# Patient Record
Sex: FEMALE | Race: WHITE | Employment: UNEMPLOYED | URBAN - METROPOLITAN AREA
[De-identification: names, ages, dates, MRNs, and addresses within clinical notes are randomized per-mention and may not be internally consistent; named-entity substitution may affect disease eponyms.]

---

## 2017-01-05 RX ORDER — LEVALBUTEROL TARTRATE 45 UG/1
1-2 AEROSOL, METERED ORAL EVERY 6 HOURS PRN
Qty: 1 INHALER | Refills: 5 | Status: SHIPPED | OUTPATIENT
Start: 2017-01-05 | End: 2017-04-27 | Stop reason: SDUPTHER

## 2017-01-06 ENCOUNTER — TELEPHONE (OUTPATIENT)
Dept: PULMONOLOGY | Age: 37
End: 2017-01-06

## 2017-01-10 ENCOUNTER — TELEPHONE (OUTPATIENT)
Dept: PULMONOLOGY | Age: 37
End: 2017-01-10

## 2017-01-25 ENCOUNTER — OFFICE VISIT (OUTPATIENT)
Dept: ORTHOPEDIC SURGERY | Age: 37
End: 2017-01-25

## 2017-01-25 VITALS — WEIGHT: 255.07 LBS | HEIGHT: 65 IN | BODY MASS INDEX: 42.5 KG/M2

## 2017-01-25 DIAGNOSIS — M70.62 TROCHANTERIC BURSITIS OF LEFT HIP: Primary | ICD-10-CM

## 2017-01-25 PROCEDURE — 99213 OFFICE O/P EST LOW 20 MIN: CPT | Performed by: ORTHOPAEDIC SURGERY

## 2017-01-25 RX ORDER — MELOXICAM 7.5 MG/1
TABLET ORAL
Qty: 30 TABLET | Refills: 3 | Status: SHIPPED | OUTPATIENT
Start: 2017-01-25 | End: 2019-03-06

## 2017-02-08 ENCOUNTER — OFFICE VISIT (OUTPATIENT)
Dept: PULMONOLOGY | Age: 37
End: 2017-02-08

## 2017-02-08 VITALS
HEIGHT: 65 IN | TEMPERATURE: 97.2 F | WEIGHT: 263 LBS | DIASTOLIC BLOOD PRESSURE: 75 MMHG | HEART RATE: 98 BPM | BODY MASS INDEX: 43.82 KG/M2 | RESPIRATION RATE: 18 BRPM | SYSTOLIC BLOOD PRESSURE: 107 MMHG | OXYGEN SATURATION: 98 %

## 2017-02-08 DIAGNOSIS — G47.33 OSA ON CPAP: Primary | ICD-10-CM

## 2017-02-08 DIAGNOSIS — Z72.821 POOR SLEEP HYGIENE: ICD-10-CM

## 2017-02-08 DIAGNOSIS — E66.01 OBESITY, CLASS III, BMI 40-49.9 (MORBID OBESITY) (HCC): ICD-10-CM

## 2017-02-08 DIAGNOSIS — Z99.89 OSA ON CPAP: Primary | ICD-10-CM

## 2017-02-08 PROBLEM — E66.813 OBESITY, CLASS III, BMI 40-49.9 (MORBID OBESITY): Status: ACTIVE | Noted: 2017-02-08

## 2017-02-08 PROCEDURE — 99213 OFFICE O/P EST LOW 20 MIN: CPT | Performed by: NURSE PRACTITIONER

## 2017-02-08 ASSESSMENT — SLEEP AND FATIGUE QUESTIONNAIRES
HOW LIKELY ARE YOU TO NOD OFF OR FALL ASLEEP WHILE SITTING QUIETLY AFTER LUNCH WITHOUT ALCOHOL: 2
HOW LIKELY ARE YOU TO NOD OFF OR FALL ASLEEP WHEN YOU ARE A PASSENGER IN A CAR FOR AN HOUR WITHOUT A BREAK: 3
HOW LIKELY ARE YOU TO NOD OFF OR FALL ASLEEP WHILE WATCHING TV: 1
ESS TOTAL SCORE: 14
NECK CIRCUMFERENCE (INCHES): 16.5
HOW LIKELY ARE YOU TO NOD OFF OR FALL ASLEEP WHILE SITTING AND TALKING TO SOMEONE: 1
HOW LIKELY ARE YOU TO NOD OFF OR FALL ASLEEP WHILE LYING DOWN TO REST IN THE AFTERNOON WHEN CIRCUMSTANCES PERMIT: 3
HOW LIKELY ARE YOU TO NOD OFF OR FALL ASLEEP WHILE SITTING AND READING: 2
HOW LIKELY ARE YOU TO NOD OFF OR FALL ASLEEP IN A CAR, WHILE STOPPED FOR A FEW MINUTES IN TRAFFIC: 1
HOW LIKELY ARE YOU TO NOD OFF OR FALL ASLEEP WHILE SITTING INACTIVE IN A PUBLIC PLACE: 1

## 2017-02-27 ENCOUNTER — HOSPITAL ENCOUNTER (OUTPATIENT)
Dept: MAMMOGRAPHY | Age: 37
Discharge: HOME | End: 2017-02-27
Attending: OBSTETRICS & GYNECOLOGY | Admitting: OBSTETRICS & GYNECOLOGY

## 2017-02-27 DIAGNOSIS — N64.4 BREAST TENDERNESS IN FEMALE: ICD-10-CM

## 2017-02-27 DIAGNOSIS — R93.89 ABNORMAL FINDINGS ON DIAGNOSTIC IMAGING OF OTHER SPECIFIED BODY STRUCTURES: ICD-10-CM

## 2017-03-07 ENCOUNTER — OFFICE VISIT (OUTPATIENT)
Dept: PULMONOLOGY | Age: 37
End: 2017-03-07

## 2017-03-07 VITALS
BODY MASS INDEX: 44.65 KG/M2 | DIASTOLIC BLOOD PRESSURE: 76 MMHG | WEIGHT: 268 LBS | HEART RATE: 101 BPM | HEIGHT: 65 IN | OXYGEN SATURATION: 98 % | TEMPERATURE: 98 F | RESPIRATION RATE: 18 BRPM | SYSTOLIC BLOOD PRESSURE: 112 MMHG

## 2017-03-07 DIAGNOSIS — J45.909 UNCOMPLICATED ASTHMA, UNSPECIFIED ASTHMA SEVERITY: Primary | ICD-10-CM

## 2017-03-07 DIAGNOSIS — Z91.09 ENVIRONMENTAL ALLERGIES: ICD-10-CM

## 2017-03-07 DIAGNOSIS — J98.6 DIAPHRAGM PARALYSIS: ICD-10-CM

## 2017-03-07 DIAGNOSIS — Z72.0 TOBACCO ABUSE: ICD-10-CM

## 2017-03-07 DIAGNOSIS — G47.33 OSA (OBSTRUCTIVE SLEEP APNEA): ICD-10-CM

## 2017-03-07 DIAGNOSIS — R06.02 SOB (SHORTNESS OF BREATH): ICD-10-CM

## 2017-03-07 PROCEDURE — 99214 OFFICE O/P EST MOD 30 MIN: CPT | Performed by: INTERNAL MEDICINE

## 2017-04-27 ENCOUNTER — OFFICE VISIT (OUTPATIENT)
Dept: PULMONOLOGY | Age: 37
End: 2017-04-27

## 2017-04-27 ENCOUNTER — TELEPHONE (OUTPATIENT)
Dept: PULMONOLOGY | Age: 37
End: 2017-04-27

## 2017-04-27 VITALS
WEIGHT: 264 LBS | HEIGHT: 65 IN | SYSTOLIC BLOOD PRESSURE: 126 MMHG | BODY MASS INDEX: 43.99 KG/M2 | TEMPERATURE: 98.2 F | DIASTOLIC BLOOD PRESSURE: 82 MMHG | RESPIRATION RATE: 18 BRPM | OXYGEN SATURATION: 98 % | HEART RATE: 100 BPM

## 2017-04-27 DIAGNOSIS — J98.6 DIAPHRAGM PARALYSIS: ICD-10-CM

## 2017-04-27 DIAGNOSIS — Z99.89 OSA ON CPAP: ICD-10-CM

## 2017-04-27 DIAGNOSIS — G47.33 OSA ON CPAP: ICD-10-CM

## 2017-04-27 DIAGNOSIS — J45.901 ASTHMA EXACERBATION: Primary | ICD-10-CM

## 2017-04-27 PROCEDURE — 99214 OFFICE O/P EST MOD 30 MIN: CPT | Performed by: INTERNAL MEDICINE

## 2017-04-27 RX ORDER — BENZONATATE 200 MG/1
200 CAPSULE ORAL 3 TIMES DAILY PRN
Qty: 90 CAPSULE | Refills: 1 | Status: SHIPPED | OUTPATIENT
Start: 2017-04-27 | End: 2017-05-04

## 2017-04-27 RX ORDER — LEVALBUTEROL TARTRATE 45 UG/1
1-2 AEROSOL, METERED ORAL EVERY 6 HOURS PRN
Qty: 1 INHALER | Refills: 5 | Status: SHIPPED | OUTPATIENT
Start: 2017-04-27 | End: 2017-09-07 | Stop reason: SDUPTHER

## 2017-04-27 ASSESSMENT — SLEEP AND FATIGUE QUESTIONNAIRES
HOW LIKELY ARE YOU TO NOD OFF OR FALL ASLEEP IN A CAR, WHILE STOPPED FOR A FEW MINUTES IN TRAFFIC: 1
HOW LIKELY ARE YOU TO NOD OFF OR FALL ASLEEP WHILE SITTING INACTIVE IN A PUBLIC PLACE: 1
NECK CIRCUMFERENCE (INCHES): 16.5
HOW LIKELY ARE YOU TO NOD OFF OR FALL ASLEEP WHILE SITTING AND READING: 2
HOW LIKELY ARE YOU TO NOD OFF OR FALL ASLEEP WHILE SITTING QUIETLY AFTER LUNCH WITHOUT ALCOHOL: 2
HOW LIKELY ARE YOU TO NOD OFF OR FALL ASLEEP WHILE SITTING AND TALKING TO SOMEONE: 1
HOW LIKELY ARE YOU TO NOD OFF OR FALL ASLEEP WHILE LYING DOWN TO REST IN THE AFTERNOON WHEN CIRCUMSTANCES PERMIT: 2
ESS TOTAL SCORE: 13
HOW LIKELY ARE YOU TO NOD OFF OR FALL ASLEEP WHEN YOU ARE A PASSENGER IN A CAR FOR AN HOUR WITHOUT A BREAK: 2
HOW LIKELY ARE YOU TO NOD OFF OR FALL ASLEEP WHILE WATCHING TV: 2

## 2017-08-28 ENCOUNTER — HOSPITAL ENCOUNTER (OUTPATIENT)
Dept: MAMMOGRAPHY | Age: 37
Discharge: HOME | End: 2017-08-28
Attending: OBSTETRICS & GYNECOLOGY | Admitting: OBSTETRICS & GYNECOLOGY

## 2017-08-28 DIAGNOSIS — R93.89 ABNORMAL FINDINGS ON DIAGNOSTIC IMAGING OF OTHER SPECIFIED BODY STRUCTURES: ICD-10-CM

## 2017-08-28 DIAGNOSIS — R92.8 ABNORMAL FINDING ON BREAST IMAGING: ICD-10-CM

## 2017-08-29 ENCOUNTER — HOSPITAL ENCOUNTER (OUTPATIENT)
Dept: NEUROLOGY | Age: 37
Discharge: HOME | End: 2017-08-29
Attending: FAMILY MEDICINE | Admitting: FAMILY MEDICINE

## 2017-08-29 DIAGNOSIS — R20.2 PARESTHESIA OF SKIN: ICD-10-CM

## 2017-09-07 ENCOUNTER — OFFICE VISIT (OUTPATIENT)
Dept: PULMONOLOGY | Age: 37
End: 2017-09-07

## 2017-09-07 VITALS
DIASTOLIC BLOOD PRESSURE: 67 MMHG | WEIGHT: 263 LBS | BODY MASS INDEX: 43.82 KG/M2 | TEMPERATURE: 98.2 F | OXYGEN SATURATION: 97 % | SYSTOLIC BLOOD PRESSURE: 119 MMHG | RESPIRATION RATE: 20 BRPM | HEIGHT: 65 IN | HEART RATE: 94 BPM

## 2017-09-07 DIAGNOSIS — J98.6 DIAPHRAGM PARALYSIS: ICD-10-CM

## 2017-09-07 DIAGNOSIS — R05.9 COUGH: ICD-10-CM

## 2017-09-07 DIAGNOSIS — J45.50 UNCOMPLICATED SEVERE PERSISTENT ASTHMA: Primary | ICD-10-CM

## 2017-09-07 DIAGNOSIS — J30.9 ALLERGIC RHINITIS, UNSPECIFIED ALLERGIC RHINITIS TRIGGER, UNSPECIFIED RHINITIS SEASONALITY: ICD-10-CM

## 2017-09-07 PROCEDURE — 99214 OFFICE O/P EST MOD 30 MIN: CPT | Performed by: INTERNAL MEDICINE

## 2017-09-07 RX ORDER — LEVALBUTEROL INHALATION SOLUTION 0.63 MG/3ML
0.63 SOLUTION RESPIRATORY (INHALATION) 4 TIMES DAILY PRN
Qty: 120 VIAL | Refills: 5 | Status: SHIPPED | OUTPATIENT
Start: 2017-09-07 | End: 2018-02-15 | Stop reason: SDUPTHER

## 2017-09-07 RX ORDER — GABAPENTIN 300 MG/1
300 CAPSULE ORAL DAILY
COMMUNITY
End: 2019-03-06

## 2017-09-07 RX ORDER — LEVALBUTEROL TARTRATE 45 UG/1
1-2 AEROSOL, METERED ORAL EVERY 6 HOURS PRN
Qty: 1 INHALER | Refills: 5 | Status: SHIPPED | OUTPATIENT
Start: 2017-09-07 | End: 2018-10-28

## 2018-02-08 ENCOUNTER — TELEPHONE (OUTPATIENT)
Dept: PULMONOLOGY | Age: 38
End: 2018-02-08

## 2018-02-14 ENCOUNTER — TELEPHONE (OUTPATIENT)
Dept: PULMONOLOGY | Age: 38
End: 2018-02-14

## 2018-02-14 NOTE — TELEPHONE ENCOUNTER
Patient went to urgent care 2/10/18 dx with bronchitis was given antibiotic, cough syrup. Patient is scheduled for a illness visit tomorrow 2/15/18 @ 9:15. Patient wondering if something should be sent in or if she should wait to see Dr. Eliseo Cruz for tomorrow. Do you have the following symptoms? Shortness of Breath  Yes  Wheezing  Yes  Cough  Yes                  Cough Characteristics:                           Productive    No                           Sputum Color    No                           Hemoptysis   n/a                           Consistency of sputum   N/a     Fever:    Yes  Temp:99.9  Chills/Sweats:  Sweats @ Night  What other symptoms are you having?:  Face hot, cheeks get verna red. Head/Head congestion    How long have you had these symptoms? 1 week     [facility]: [facility]           Review medications and allergies: Allergies? Allergies   Allergen Reactions    Other Dermatitis     BANDAIDS, ADHESIVES     Penicillins Swelling    Percocet [Oxycodone-Acetaminophen] Rash    Sulfa Antibiotics Rash                        Currently on Antibiotics? (Drug/Dose/Frequency and how long on?) Yes Zithromax 250 daily                    Systemic Steroids? (Drug/Dose/Frequency and how long on?) No      Last sick call taken on 1/10/17. Meds prescribed were See PCP    LOV: 9/7/17    ASSESSMENT:   · Asthma - mod persistent.  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 Malachi Ibrahim 11/16/15, LE edema on lasix, Hiatal hernia s/p fundoplication 8/8111     PLAN: · Tessalon pearls 200mg PO TID PRN  · Dulera 100 mcg 2 puffs BID  · Refill PRN  xoponex  · Continue Singulair   · Continue nasal steroid daily and Zyrtec - she is not using daily  · sinus rinses PRN   · Avoid second smoke from her   · quit smoking 10/2015; still using nicotine gum -instructed to wean off  · Auto CPAP 10-14cm H20. F/u in sleep clinic  · F/u in 6 months with me for SOB and asthma

## 2018-02-15 ENCOUNTER — OFFICE VISIT (OUTPATIENT)
Dept: PULMONOLOGY | Age: 38
End: 2018-02-15

## 2018-02-15 VITALS
HEIGHT: 65 IN | BODY MASS INDEX: 43.99 KG/M2 | WEIGHT: 264 LBS | HEART RATE: 83 BPM | SYSTOLIC BLOOD PRESSURE: 126 MMHG | RESPIRATION RATE: 20 BRPM | OXYGEN SATURATION: 98 % | TEMPERATURE: 97.9 F | DIASTOLIC BLOOD PRESSURE: 82 MMHG

## 2018-02-15 DIAGNOSIS — J30.1 CHRONIC ALLERGIC RHINITIS DUE TO POLLEN, UNSPECIFIED SEASONALITY: ICD-10-CM

## 2018-02-15 DIAGNOSIS — J06.9 ACUTE URI: Primary | ICD-10-CM

## 2018-02-15 DIAGNOSIS — J45.50 UNCOMPLICATED SEVERE PERSISTENT ASTHMA: ICD-10-CM

## 2018-02-15 DIAGNOSIS — G47.33 OSA ON CPAP: ICD-10-CM

## 2018-02-15 DIAGNOSIS — Z99.89 OSA ON CPAP: ICD-10-CM

## 2018-02-15 PROCEDURE — 99214 OFFICE O/P EST MOD 30 MIN: CPT | Performed by: INTERNAL MEDICINE

## 2018-02-15 PROCEDURE — G8417 CALC BMI ABV UP PARAM F/U: HCPCS | Performed by: INTERNAL MEDICINE

## 2018-02-15 PROCEDURE — G8427 DOCREV CUR MEDS BY ELIG CLIN: HCPCS | Performed by: INTERNAL MEDICINE

## 2018-02-15 PROCEDURE — G8484 FLU IMMUNIZE NO ADMIN: HCPCS | Performed by: INTERNAL MEDICINE

## 2018-02-15 PROCEDURE — 1036F TOBACCO NON-USER: CPT | Performed by: INTERNAL MEDICINE

## 2018-02-15 RX ORDER — PREDNISONE 10 MG/1
30 TABLET ORAL DAILY
Qty: 15 TABLET | Refills: 0 | Status: SHIPPED | OUTPATIENT
Start: 2018-02-15 | End: 2018-02-20

## 2018-02-15 RX ORDER — LEVALBUTEROL INHALATION SOLUTION 0.63 MG/3ML
0.63 SOLUTION RESPIRATORY (INHALATION) 4 TIMES DAILY PRN
Qty: 120 VIAL | Refills: 5 | Status: SHIPPED | OUTPATIENT
Start: 2018-02-15 | End: 2019-03-06 | Stop reason: SDUPTHER

## 2018-02-15 RX ORDER — BENZONATATE 200 MG/1
200 CAPSULE ORAL 3 TIMES DAILY PRN
Qty: 90 CAPSULE | Refills: 1 | Status: SHIPPED | OUTPATIENT
Start: 2018-02-15 | End: 2018-02-22

## 2018-02-15 NOTE — PROGRESS NOTES
[facility] Pulmonary, Critical Care, and Sleep    Outpatient Follow Up Note    CC: asthma and ill visit  Consulting provider: Ellen Canales MD    Interval History: 44 y.o. female patient is here today for an ill visit. She was seen by her PCP and diagnosed with a viral illness and then the next day went to urgent care and treated for the flu with Tamiflu and prednisone and the next day went to the emergency room where she was given cough syrup and antibiotics. Negative rapid flu in ER. She is complaining of pressure in her ears with a cough and runny nose for 1 week. She has stopped getting worse and improved slightly today. At baseline she typically does well and soes not frequently not use her rescue inhale. Initial HPI: h/o smoking, Asthma for about 20 years, obesity, GERD, anxiety presents for dyspnea and asthma. The patient states she has uncontrolled symptoms most of this year. She did move into a new house this year. The patient was seen by her NP yesterday and started on Levaquin, Singulair and steroids. The pt reports she has been on prednisone treatments most of the year. She has SOB and wheezing daily and at night. Dry cough every day and night intermittently. She uses her rescue inhalers several times per day with some help. She does have chronic nasal congestion with post-nasal gtt. Symptoms are worse with strong smells, heat, environmental allergies (she is not sure what they are). The patient does not think that her cats make things worse. Her house has mold and dust. No down bedding. Smokes 1PPD for last 20 years.     Current Medications:    Current Outpatient Prescriptions:     Oseltamivir Phosphate (TAMIFLU PO), Take 1 capsule by mouth daily Is on 3rd days dose, Disp: , Rfl:     predniSONE (DELTASONE) 20 MG tablet, Take 40 mg by mouth daily Is on 3rd days dose, Disp: , Rfl:     ondansetron (ZOFRAN) 4 MG tablet, Take 4 mg by mouth every 8 hours as needed for Nausea or Vomiting, Disp: , Rfl:

## 2018-10-02 ENCOUNTER — OFFICE VISIT (OUTPATIENT)
Dept: PULMONOLOGY | Age: 38
End: 2018-10-02
Payer: COMMERCIAL

## 2018-10-02 VITALS
DIASTOLIC BLOOD PRESSURE: 63 MMHG | HEART RATE: 84 BPM | BODY MASS INDEX: 43.25 KG/M2 | TEMPERATURE: 98.4 F | RESPIRATION RATE: 20 BRPM | HEIGHT: 65 IN | WEIGHT: 259.6 LBS | OXYGEN SATURATION: 97 % | SYSTOLIC BLOOD PRESSURE: 100 MMHG

## 2018-10-02 DIAGNOSIS — J30.2 SEASONAL ALLERGIC RHINITIS, UNSPECIFIED TRIGGER: ICD-10-CM

## 2018-10-02 DIAGNOSIS — J45.50 UNCOMPLICATED SEVERE PERSISTENT ASTHMA: ICD-10-CM

## 2018-10-02 DIAGNOSIS — G47.33 OSA ON CPAP: ICD-10-CM

## 2018-10-02 DIAGNOSIS — Z99.89 OSA ON CPAP: ICD-10-CM

## 2018-10-02 DIAGNOSIS — Z23 NEED FOR INFLUENZA VACCINATION: Primary | ICD-10-CM

## 2018-10-02 PROCEDURE — 1036F TOBACCO NON-USER: CPT | Performed by: INTERNAL MEDICINE

## 2018-10-02 PROCEDURE — G8427 DOCREV CUR MEDS BY ELIG CLIN: HCPCS | Performed by: INTERNAL MEDICINE

## 2018-10-02 PROCEDURE — 90471 IMMUNIZATION ADMIN: CPT | Performed by: INTERNAL MEDICINE

## 2018-10-02 PROCEDURE — 90688 IIV4 VACCINE SPLT 0.5 ML IM: CPT | Performed by: INTERNAL MEDICINE

## 2018-10-02 PROCEDURE — G8417 CALC BMI ABV UP PARAM F/U: HCPCS | Performed by: INTERNAL MEDICINE

## 2018-10-02 PROCEDURE — 99214 OFFICE O/P EST MOD 30 MIN: CPT | Performed by: INTERNAL MEDICINE

## 2018-10-02 PROCEDURE — G8482 FLU IMMUNIZE ORDER/ADMIN: HCPCS | Performed by: INTERNAL MEDICINE

## 2018-10-02 NOTE — PROGRESS NOTES
[facility] Pulmonary, Critical Care, and Sleep    Outpatient Follow Up Note    CC: asthma and ill visit  Consulting provider: Luis Antonio Bullard MD    Interval History: 43 y.o. female  smokes in the house. She wheezes about 1-2 days/week. Worse with allergies or smoke. Her new apt has a roof leak. No visible leak. She has not been using her [facility] as scheduled. Initial HPI: h/o smoking, Asthma for about 20 years, obesity, GERD, anxiety presents for dyspnea and asthma. The patient states she has uncontrolled symptoms most of this year. She did move into a new house this year. The patient was seen by her NP yesterday and started on Levaquin, Singulair and steroids. The pt reports she has been on prednisone treatments most of the year. She has SOB and wheezing daily and at night. Dry cough every day and night intermittently. She uses her rescue inhalers several times per day with some help. She does have chronic nasal congestion with post-nasal gtt. Symptoms are worse with strong smells, heat, environmental allergies (she is not sure what they are). The patient does not think that her cats make things worse. Her house has mold and dust. No down bedding. Smokes 1PPD for last 20 years.     Current Medications:    Current Outpatient Prescriptions:     DULERA 100-5 MCG/ACT inhaler, INHALE 2 PUFFS TWICE DAILY, Disp: 13 g, Rfl: 5    cetirizine (ZYRTEC) 10 MG tablet, Take 1 tablet by mouth daily, Disp: 30 tablet, Rfl: 0    famotidine (PEPCID) 20 MG tablet, Take 1 tablet by mouth 2 times daily, Disp: 60 tablet, Rfl: 0    levalbuterol (XOPENEX) 0.63 MG/3ML nebulization, Take 3 mLs by nebulization 4 times daily as needed for Wheezing or Shortness of Breath J45.50, Disp: 120 vial, Rfl: 5    ondansetron (ZOFRAN) 4 MG tablet, Take 4 mg by mouth every 8 hours as needed for Nausea or Vomiting, Disp: , Rfl:     gabapentin (NEURONTIN) 300 MG capsule, Take 300 mg by mouth daily As directed, Disp: , Rfl:     levalbuterol [facility] HF) 45 MCG/ACT inhaler, Inhale 1-2 puffs into the lungs every 6 hours as needed for Wheezing or Shortness of Breath, Disp: 1 Inhaler, Rfl: 5    Cetirizine HCl (ZYRTEC ALLERGY) 10 MG CAPS, Take 1 tablet by mouth daily as needed (Allergy or asthma), Disp: 15 capsule, Rfl: 0    meloxicam (MOBIC) 7.5 MG tablet, Take 1 tablet daily, Disp: 30 tablet, Rfl: 3    diclofenac sodium (VOLTAREN) 1 % GEL, Apply 4 g topically 4 times daily Apply to left hip as needed, Disp: 1 Tube, Rfl: 2    metFORMIN (GLUCOPHAGE) 500 MG tablet, Take 500 mg by mouth daily (with breakfast), Disp: , Rfl:     losartan (COZAAR) 50 MG tablet, Take 50 mg by mouth daily, Disp: , Rfl:     ranitidine (ZANTAC) 150 MG capsule, Take 150 mg by mouth daily, Disp: , Rfl:     ibuprofen (ADVIL;MOTRIN) 600 MG tablet, Take 1 tablet by mouth every 6 hours as needed for Pain, Disp: 30 tablet, Rfl: 2    potassium chloride (MICRO-K) 10 MEQ CR capsule, Take 10 mEq by mouth 2 times daily, Disp: , Rfl:     montelukast (SINGULAIR) 10 MG tablet, Take 10 mg by mouth nightly, Disp: , Rfl:     Spacer/Aero-Holding Chambers (E-Z SPACER) TED, 1 Device by Does not apply route daily as needed, Disp: 1 Device, Rfl: 0    Peak Flow Meter (POCKET PEAK FLOW METER) TED, 1 Device by Does not apply route 4 times daily, Disp: 1 Device, Rfl: 0    esomeprazole Magnesium (NEXIUM) 20 MG PACK, Take 20 mg by mouth 2 times daily , Disp: , Rfl:     Objective:   PHYSICAL EXAM:    /63   Pulse 84   Temp 98.4 °F (36.9 °C) (Oral)   Resp 20   Ht 5' 5\" (1.651 m)   Wt 259 lb 9.6 oz (117.8 kg)   SpO2 97% Comment: RA  BMI 43.20 kg/m²   Constitutional: In no acute distress. Appears stated age. Well developed and nourished  Eyes: PERRL. No sclera icterus. No conjunctival injection. ENT: Oropharynx erythematous without exudate. Neck: Trachea midline. No thyroid tenderness. Lymph: No cervical LAD. No supraclavicular LAD. Resp: No accessory muscle use. No crackles.  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 Romulo Young 11/16/15, LE edema on lasix, Hiatal hernia s/p fundoplication 9/3407    PLAN:   Dulera 100 mcg BID - reminded to use at least daily.   PRN  Xoponex   PRN tessalon pearls  Continue Singulair   Continue nasal steroid daily and Zyrtec  sinus rinses PRN   Avoid second smoke from her   quit smoking 10/2015; still using nicotine gum -instructed to wean off  Auto CPAP 10-14cm H20. F/u in sleep clinic  F/u in 6 months

## 2018-10-02 NOTE — PATIENT INSTRUCTIONS
Vaccine Information Statement    Influenza (Flu) Vaccine (Inactivated or Recombinant): What you need to know    Many Vaccine Information Statements are available in [place] and other languages. See www.immunize.org/vis  Hojas de Información Sobre Vacunas están disponibles en Español y en muchos otros idiomas. Visite www.immunize.org/vis    1. Why get vaccinated? Influenza (flu) is a contagious disease that spreads around the [place] every year, usually between October and May. Flu is caused by influenza viruses, and is spread mainly by coughing, sneezing, and close contact. Anyone can get flu. Flu strikes suddenly and can last several days. Symptoms vary by age, but can include:   fever/chills   sore throat   muscle aches   fatigue   cough   headache    runny or stuffy nose    Flu can also lead to pneumonia and blood infections, and cause diarrhea and seizures in children. If you have a medical condition, such as heart or lung disease, flu can make it worse. Flu is more dangerous for some people. Infants and young children, people 72years of age and older, pregnant women, and people with certain health conditions or a weakened immune system are at greatest risk. Each year thousands of people in the [facility] die from flu, and many more are hospitalized. Flu vaccine can:   keep you from getting flu,   make flu less severe if you do get it, and   keep you from spreading flu to your family and other people. 2. Inactivated and recombinant flu vaccines    A dose of flu vaccine is recommended every flu season. Children 6 months through 6years of age may need two doses during the same flu season. Everyone else needs only one dose each flu season.        Some inactivated flu vaccines contain a very small amount of a mercury-based preservative called thimerosal. Studies have not shown thimerosal in vaccines to be harmful, but flu vaccines that do not contain thimerosal are

## 2018-10-02 NOTE — PROGRESS NOTES
Vaccine Information Sheet, \"Influenza - Inactivated\"  given to Андрей Stephens, or parent/legal guardian of  Андрей Stephens and verbalized understanding. Patient responses:    Have you ever had a reaction to a flu vaccine? No  Are you able to eat eggs without adverse effects? Yes  Do you have any current illness? No  Have you ever had Guillian Yolo Syndrome? No    Flu vaccine given per order. Please see immunization tab.

## 2018-10-28 ENCOUNTER — HOSPITAL ENCOUNTER (EMERGENCY)
Age: 38
Discharge: HOME | End: 2018-10-28
Payer: COMMERCIAL

## 2018-10-28 ENCOUNTER — APPOINTMENT (OUTPATIENT)
Dept: GENERAL RADIOLOGY | Age: 38
End: 2018-10-28
Payer: COMMERCIAL

## 2018-10-28 VITALS
HEART RATE: 88 BPM | WEIGHT: 259 LBS | DIASTOLIC BLOOD PRESSURE: 82 MMHG | HEIGHT: 65 IN | BODY MASS INDEX: 43.15 KG/M2 | TEMPERATURE: 97.5 F | SYSTOLIC BLOOD PRESSURE: 121 MMHG | RESPIRATION RATE: 14 BRPM | OXYGEN SATURATION: 100 %

## 2018-10-28 DIAGNOSIS — J40 BRONCHITIS: Primary | ICD-10-CM

## 2018-10-28 DIAGNOSIS — G24.5 EYE TWITCH: ICD-10-CM

## 2018-10-28 DIAGNOSIS — R51.9 HEADACHE, CHRONIC DAILY: ICD-10-CM

## 2018-10-28 PROCEDURE — 71046 X-RAY EXAM CHEST 2 VIEWS: CPT

## 2018-10-28 PROCEDURE — 99283 EMERGENCY DEPT VISIT LOW MDM: CPT

## 2018-10-28 RX ORDER — AZITHROMYCIN 250 MG/1
TABLET, FILM COATED ORAL
Qty: 1 PACKET | Refills: 0 | Status: SHIPPED | OUTPATIENT
Start: 2018-10-28 | End: 2019-03-06

## 2018-10-28 RX ORDER — PREDNISONE 20 MG/1
40 TABLET ORAL DAILY
Qty: 10 TABLET | Refills: 0 | Status: SHIPPED | OUTPATIENT
Start: 2018-10-28 | End: 2018-11-02

## 2018-10-28 RX ORDER — BENZONATATE 200 MG/1
200 CAPSULE ORAL 3 TIMES DAILY PRN
Qty: 20 CAPSULE | Refills: 0 | Status: SHIPPED | OUTPATIENT
Start: 2018-10-28 | End: 2018-11-04

## 2018-10-28 RX ORDER — LEVALBUTEROL TARTRATE 45 UG/1
1-2 AEROSOL, METERED ORAL EVERY 6 HOURS PRN
Qty: 1 INHALER | Refills: 1 | Status: SHIPPED | OUTPATIENT
Start: 2018-10-28 | End: 2019-12-10 | Stop reason: SDUPTHER

## 2018-10-28 ASSESSMENT — ENCOUNTER SYMPTOMS
WHEEZING: 1
RHINORRHEA: 1
VOMITING: 0
COUGH: 1

## 2019-01-03 RX ORDER — LEVALBUTEROL TARTRATE 45 UG/1
1-2 AEROSOL, METERED ORAL EVERY 6 HOURS PRN
Qty: 15 G | Refills: 5 | Status: SHIPPED | OUTPATIENT
Start: 2019-01-03 | End: 2019-03-06 | Stop reason: SDUPTHER

## 2019-01-10 ENCOUNTER — APPOINTMENT (OUTPATIENT)
Dept: CT IMAGING | Age: 39
End: 2019-01-10
Payer: COMMERCIAL

## 2019-01-10 ENCOUNTER — HOSPITAL ENCOUNTER (EMERGENCY)
Age: 39
Discharge: HOME | End: 2019-01-10
Attending: EMERGENCY MEDICINE
Payer: COMMERCIAL

## 2019-01-10 VITALS
HEIGHT: 65 IN | RESPIRATION RATE: 16 BRPM | HEART RATE: 86 BPM | OXYGEN SATURATION: 98 % | TEMPERATURE: 98.1 F | DIASTOLIC BLOOD PRESSURE: 76 MMHG | BODY MASS INDEX: 43.15 KG/M2 | WEIGHT: 259 LBS | SYSTOLIC BLOOD PRESSURE: 129 MMHG

## 2019-01-10 DIAGNOSIS — R11.0 NAUSEA: ICD-10-CM

## 2019-01-10 DIAGNOSIS — K59.00 CONSTIPATION, UNSPECIFIED CONSTIPATION TYPE: ICD-10-CM

## 2019-01-10 DIAGNOSIS — R10.30 LOWER ABDOMINAL PAIN: Primary | ICD-10-CM

## 2019-01-10 LAB
A/G RATIO: 1.2 (ref 1.1–2.2)
ALBUMIN SERPL-MCNC: 4 G/DL (ref 3.4–5)
ALP BLD-CCNC: 95 U/L (ref 40–129)
ALT SERPL-CCNC: 13 U/L (ref 10–40)
ANION GAP SERPL CALCULATED.3IONS-SCNC: 11 MMOL/L (ref 3–16)
AST SERPL-CCNC: 12 U/L (ref 15–37)
BILIRUB SERPL-MCNC: <0.2 MG/DL (ref 0–1)
BILIRUBIN URINE: NEGATIVE
BLOOD, URINE: NEGATIVE
BUN BLDV-MCNC: 12 MG/DL (ref 7–20)
CALCIUM SERPL-MCNC: 9.2 MG/DL (ref 8.3–10.6)
CHLORIDE BLD-SCNC: 99 MMOL/L (ref 99–110)
CLARITY: CLEAR
CO2: 29 MMOL/L (ref 21–32)
COLOR: YELLOW
CREAT SERPL-MCNC: 0.7 MG/DL (ref 0.6–1.1)
GFR AFRICAN AMERICAN: >60
GFR NON-AFRICAN AMERICAN: >60
GLOBULIN: 3.3 G/DL
GLUCOSE BLD-MCNC: 95 MG/DL (ref 70–99)
GLUCOSE URINE: NEGATIVE MG/DL
HCG QUALITATIVE: NEGATIVE
HCT VFR BLD CALC: 41.7 % (ref 36–48)
HEMOGLOBIN: 13.4 G/DL (ref 12–16)
KETONES, URINE: NEGATIVE MG/DL
LEUKOCYTE ESTERASE, URINE: NEGATIVE
LIPASE: 15 U/L (ref 13–60)
MCH RBC QN AUTO: 26.4 PG (ref 26–34)
MCHC RBC AUTO-ENTMCNC: 32.2 G/DL (ref 31–36)
MCV RBC AUTO: 82 FL (ref 80–100)
MICROSCOPIC EXAMINATION: NORMAL
NITRITE, URINE: NEGATIVE
PDW BLD-RTO: 13.6 % (ref 12.4–15.4)
PH UA: 6.5
PLATELET # BLD: 276 K/UL (ref 135–450)
PMV BLD AUTO: 8.2 FL (ref 5–10.5)
POTASSIUM SERPL-SCNC: 4 MMOL/L (ref 3.5–5.1)
PROTEIN UA: NEGATIVE MG/DL
RBC # BLD: 5.09 M/UL (ref 4–5.2)
SODIUM BLD-SCNC: 139 MMOL/L (ref 136–145)
SPECIFIC GRAVITY UA: 1.02
TOTAL PROTEIN: 7.3 G/DL (ref 6.4–8.2)
URINE TYPE: NORMAL
UROBILINOGEN, URINE: 0.2 E.U./DL
WBC # BLD: 11.1 K/UL (ref 4–11)

## 2019-01-10 PROCEDURE — 83690 ASSAY OF LIPASE: CPT

## 2019-01-10 PROCEDURE — 74177 CT ABD & PELVIS W/CONTRAST: CPT

## 2019-01-10 PROCEDURE — 84703 CHORIONIC GONADOTROPIN ASSAY: CPT

## 2019-01-10 PROCEDURE — 85027 COMPLETE CBC AUTOMATED: CPT

## 2019-01-10 PROCEDURE — 99284 EMERGENCY DEPT VISIT MOD MDM: CPT

## 2019-01-10 PROCEDURE — 81003 URINALYSIS AUTO W/O SCOPE: CPT

## 2019-01-10 PROCEDURE — 96374 THER/PROPH/DIAG INJ IV PUSH: CPT

## 2019-01-10 PROCEDURE — 2580000003 HC RX 258: Performed by: NURSE PRACTITIONER

## 2019-01-10 PROCEDURE — 36415 COLL VENOUS BLD VENIPUNCTURE: CPT

## 2019-01-10 PROCEDURE — 6360000004 HC RX CONTRAST MEDICATION: Performed by: NURSE PRACTITIONER

## 2019-01-10 PROCEDURE — 80053 COMPREHEN METABOLIC PANEL: CPT

## 2019-01-10 PROCEDURE — 96361 HYDRATE IV INFUSION ADD-ON: CPT

## 2019-01-10 PROCEDURE — 6360000002 HC RX W HCPCS: Performed by: NURSE PRACTITIONER

## 2019-01-10 PROCEDURE — 96372 THER/PROPH/DIAG INJ SC/IM: CPT

## 2019-01-10 RX ORDER — ONDANSETRON 2 MG/ML
4 INJECTION INTRAMUSCULAR; INTRAVENOUS ONCE
Status: COMPLETED | OUTPATIENT
Start: 2019-01-10 | End: 2019-01-10

## 2019-01-10 RX ORDER — DICYCLOMINE HYDROCHLORIDE 10 MG/ML
20 INJECTION INTRAMUSCULAR ONCE
Status: COMPLETED | OUTPATIENT
Start: 2019-01-10 | End: 2019-01-10

## 2019-01-10 RX ORDER — 0.9 % SODIUM CHLORIDE 0.9 %
1000 INTRAVENOUS SOLUTION INTRAVENOUS ONCE
Status: COMPLETED | OUTPATIENT
Start: 2019-01-10 | End: 2019-01-10

## 2019-01-10 RX ORDER — DICYCLOMINE HYDROCHLORIDE 10 MG/1
10 CAPSULE ORAL
Qty: 120 CAPSULE | Refills: 0 | Status: SHIPPED | OUTPATIENT
Start: 2019-01-10 | End: 2019-12-10

## 2019-01-10 RX ORDER — ONDANSETRON 4 MG/1
4 TABLET, ORALLY DISINTEGRATING ORAL EVERY 8 HOURS PRN
Qty: 12 TABLET | Refills: 0 | Status: SHIPPED | OUTPATIENT
Start: 2019-01-10 | End: 2022-03-09

## 2019-01-10 RX ADMIN — ONDANSETRON 4 MG: 2 INJECTION INTRAMUSCULAR; INTRAVENOUS at 21:10

## 2019-01-10 RX ADMIN — SODIUM CHLORIDE 1000 ML: 9 INJECTION, SOLUTION INTRAVENOUS at 21:10

## 2019-01-10 RX ADMIN — DICYCLOMINE HYDROCHLORIDE 20 MG: 20 INJECTION, SOLUTION INTRAMUSCULAR at 21:10

## 2019-01-10 RX ADMIN — IOPAMIDOL 75 ML: 755 INJECTION, SOLUTION INTRAVENOUS at 21:45

## 2019-01-10 ASSESSMENT — PAIN DESCRIPTION - PROGRESSION: CLINICAL_PROGRESSION: GRADUALLY WORSENING

## 2019-01-10 ASSESSMENT — PAIN DESCRIPTION - ORIENTATION: ORIENTATION: MID

## 2019-01-10 ASSESSMENT — PAIN SCALES - GENERAL
PAINLEVEL_OUTOF10: 3
PAINLEVEL_OUTOF10: 7

## 2019-01-10 ASSESSMENT — PAIN DESCRIPTION - DESCRIPTORS: DESCRIPTORS: ACHING

## 2019-01-10 ASSESSMENT — PAIN DESCRIPTION - LOCATION: LOCATION: ABDOMEN

## 2019-01-10 ASSESSMENT — PAIN DESCRIPTION - ONSET: ONSET: ON-GOING

## 2019-01-10 ASSESSMENT — PAIN DESCRIPTION - FREQUENCY: FREQUENCY: CONTINUOUS

## 2019-01-23 ENCOUNTER — HOSPITAL ENCOUNTER (OUTPATIENT)
Age: 39
Discharge: HOME | End: 2019-01-23
Payer: COMMERCIAL

## 2019-01-23 LAB
C DIFFICILE TOXIN, EIA: NORMAL
WHITE BLOOD CELLS (WBC), STOOL: NORMAL

## 2019-01-23 PROCEDURE — 87449 NOS EACH ORGANISM AG IA: CPT

## 2019-01-23 PROCEDURE — 83993 ASSAY FOR CALPROTECTIN FECAL: CPT

## 2019-01-23 PROCEDURE — 87336 ENTAMOEB HIST DISPR AG IA: CPT

## 2019-01-23 PROCEDURE — 83630 LACTOFERRIN FECAL (QUAL): CPT

## 2019-01-23 PROCEDURE — 87328 CRYPTOSPORIDIUM AG IA: CPT

## 2019-01-23 PROCEDURE — 87046 STOOL CULTR AEROBIC BACT EA: CPT

## 2019-01-23 PROCEDURE — 87505 NFCT AGENT DETECTION GI: CPT

## 2019-01-23 PROCEDURE — 87324 CLOSTRIDIUM AG IA: CPT

## 2019-01-24 LAB
CRYPTOSPORIDIUM ANTIGEN STOOL: NORMAL
E HISTOLYTICA ANTIGEN STOOL: NORMAL
GI BACTERIAL PATHOGENS BY PCR: NORMAL
GIARDIA ANTIGEN STOOL: NORMAL

## 2019-01-25 LAB — CALPROTECTIN: 33 UG/G

## 2019-02-26 ENCOUNTER — TELEPHONE (OUTPATIENT)
Dept: PULMONOLOGY | Age: 39
End: 2019-02-26

## 2019-02-26 RX ORDER — ALBUTEROL SULFATE 90 UG/1
2 AEROSOL, METERED RESPIRATORY (INHALATION) EVERY 6 HOURS PRN
Qty: 18 G | Refills: 0 | Status: SHIPPED | OUTPATIENT
Start: 2019-02-26 | End: 2019-03-06 | Stop reason: ALTCHOICE

## 2019-02-27 ENCOUNTER — APPOINTMENT (OUTPATIENT)
Dept: GENERAL RADIOLOGY | Age: 39
End: 2019-02-27
Payer: COMMERCIAL

## 2019-02-27 ENCOUNTER — HOSPITAL ENCOUNTER (EMERGENCY)
Age: 39
Discharge: HOME | End: 2019-02-27
Attending: EMERGENCY MEDICINE
Payer: COMMERCIAL

## 2019-02-27 VITALS
RESPIRATION RATE: 22 BRPM | HEART RATE: 97 BPM | BODY MASS INDEX: 40.82 KG/M2 | SYSTOLIC BLOOD PRESSURE: 138 MMHG | WEIGHT: 245 LBS | HEIGHT: 65 IN | OXYGEN SATURATION: 99 % | DIASTOLIC BLOOD PRESSURE: 90 MMHG | TEMPERATURE: 97.6 F

## 2019-02-27 DIAGNOSIS — J45.21 MILD INTERMITTENT ASTHMA WITH EXACERBATION: ICD-10-CM

## 2019-02-27 DIAGNOSIS — R03.0 ELEVATED BLOOD PRESSURE READING: ICD-10-CM

## 2019-02-27 DIAGNOSIS — J20.9 ACUTE BRONCHITIS, UNSPECIFIED ORGANISM: Primary | ICD-10-CM

## 2019-02-27 DIAGNOSIS — Z20.828 EXPOSURE TO INFLUENZA: ICD-10-CM

## 2019-02-27 PROCEDURE — 99283 EMERGENCY DEPT VISIT LOW MDM: CPT

## 2019-02-27 PROCEDURE — 6370000000 HC RX 637 (ALT 250 FOR IP): Performed by: EMERGENCY MEDICINE

## 2019-02-27 PROCEDURE — 71046 X-RAY EXAM CHEST 2 VIEWS: CPT

## 2019-02-27 RX ORDER — ALBUTEROL SULFATE 90 UG/1
2 AEROSOL, METERED RESPIRATORY (INHALATION) EVERY 6 HOURS PRN
Status: DISCONTINUED | OUTPATIENT
Start: 2019-02-27 | End: 2019-02-27 | Stop reason: HOSPADM

## 2019-02-27 RX ORDER — PREDNISONE 20 MG/1
60 TABLET ORAL ONCE
Status: COMPLETED | OUTPATIENT
Start: 2019-02-27 | End: 2019-02-27

## 2019-02-27 RX ORDER — OSELTAMIVIR PHOSPHATE 75 MG/1
75 CAPSULE ORAL ONCE
Status: COMPLETED | OUTPATIENT
Start: 2019-02-27 | End: 2019-02-27

## 2019-02-27 RX ORDER — DOXYCYCLINE HYCLATE 100 MG/1
100 CAPSULE ORAL 2 TIMES DAILY
COMMUNITY
End: 2019-03-06

## 2019-02-27 RX ORDER — IPRATROPIUM BROMIDE AND ALBUTEROL SULFATE 2.5; .5 MG/3ML; MG/3ML
1 SOLUTION RESPIRATORY (INHALATION) ONCE
Status: COMPLETED | OUTPATIENT
Start: 2019-02-27 | End: 2019-02-27

## 2019-02-27 RX ORDER — OSELTAMIVIR PHOSPHATE 75 MG/1
75 CAPSULE ORAL DAILY
Qty: 7 CAPSULE | Refills: 0 | Status: SHIPPED | OUTPATIENT
Start: 2019-02-27 | End: 2019-03-06

## 2019-02-27 RX ADMIN — OSELTAMIVIR PHOSPHATE 75 MG: 75 CAPSULE ORAL at 05:55

## 2019-02-27 RX ADMIN — IPRATROPIUM BROMIDE AND ALBUTEROL SULFATE 1 AMPULE: .5; 3 SOLUTION RESPIRATORY (INHALATION) at 05:18

## 2019-02-27 RX ADMIN — PREDNISONE 60 MG: 20 TABLET ORAL at 05:16

## 2019-02-27 ASSESSMENT — PAIN DESCRIPTION - PAIN TYPE: TYPE: ACUTE PAIN

## 2019-02-27 ASSESSMENT — PAIN SCALES - GENERAL: PAINLEVEL_OUTOF10: 7

## 2019-02-27 ASSESSMENT — PAIN DESCRIPTION - LOCATION: LOCATION: CHEST

## 2019-03-01 ENCOUNTER — TELEPHONE (OUTPATIENT)
Dept: PULMONOLOGY | Age: 39
End: 2019-03-01

## 2019-03-01 NOTE — TELEPHONE ENCOUNTER
Patient did not show for Wheezing f/u appointment  with Dr. Bette Quinones on 3/1/19    Same Day Cancellation: Yes    Patient rescheduled:  No    New appointment: pt already scheduled for 4/2/19    Patient was also no show on: N/a    LOV 10/2/18      ASSESSMENT:   · Asthma - mod persistent. 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 Lori Fitzpatrick 11/16/15, LE edema on lasix, Hiatal hernia s/p fundoplication 2/5074     PLAN:   · Dulera 100 mcg BID - reminded to use at least daily.   · PRN  Xoponex   · PRN tessalon pearls  · Continue Singulair   · Continue nasal steroid daily and Zyrtec  · sinus rinses PRN   · Avoid second smoke from her   · quit smoking 10/2015; still using nicotine gum -instructed to wean off  · Auto CPAP 10-14cm H20. F/u in sleep clinic  · F/u in 6 months

## 2019-03-04 ENCOUNTER — HOSPITAL ENCOUNTER (EMERGENCY)
Age: 39
Discharge: HOME | End: 2019-03-04
Attending: EMERGENCY MEDICINE
Payer: COMMERCIAL

## 2019-03-04 ENCOUNTER — APPOINTMENT (OUTPATIENT)
Dept: CT IMAGING | Age: 39
End: 2019-03-04
Payer: COMMERCIAL

## 2019-03-04 VITALS
BODY MASS INDEX: 40.82 KG/M2 | HEIGHT: 65 IN | SYSTOLIC BLOOD PRESSURE: 106 MMHG | TEMPERATURE: 97.6 F | DIASTOLIC BLOOD PRESSURE: 68 MMHG | RESPIRATION RATE: 12 BRPM | WEIGHT: 245 LBS | HEART RATE: 75 BPM | OXYGEN SATURATION: 100 %

## 2019-03-04 DIAGNOSIS — S39.012A STRAIN OF LUMBAR REGION, INITIAL ENCOUNTER: Primary | ICD-10-CM

## 2019-03-04 LAB
BACTERIA: ABNORMAL /HPF
BILIRUBIN URINE: NEGATIVE
BLOOD, URINE: NEGATIVE
CLARITY: CLEAR
COLOR: YELLOW
EPITHELIAL CELLS, UA: ABNORMAL /HPF
GLUCOSE URINE: NEGATIVE MG/DL
HCG(URINE) PREGNANCY TEST: NEGATIVE
KETONES, URINE: NEGATIVE MG/DL
LEUKOCYTE ESTERASE, URINE: NEGATIVE
MICROSCOPIC EXAMINATION: ABNORMAL
MUCUS: ABNORMAL /LPF
NITRITE, URINE: NEGATIVE
PH UA: 6 (ref 5–8)
PROTEIN UA: NEGATIVE MG/DL
RBC UA: ABNORMAL /HPF (ref 0–2)
SPECIFIC GRAVITY UA: >=1.03 (ref 1–1.03)
UROBILINOGEN, URINE: 0.2 E.U./DL
WBC UA: ABNORMAL /HPF (ref 0–5)

## 2019-03-04 PROCEDURE — 96372 THER/PROPH/DIAG INJ SC/IM: CPT

## 2019-03-04 PROCEDURE — 6360000002 HC RX W HCPCS: Performed by: EMERGENCY MEDICINE

## 2019-03-04 PROCEDURE — 99284 EMERGENCY DEPT VISIT MOD MDM: CPT

## 2019-03-04 PROCEDURE — 84703 CHORIONIC GONADOTROPIN ASSAY: CPT

## 2019-03-04 PROCEDURE — 72131 CT LUMBAR SPINE W/O DYE: CPT

## 2019-03-04 PROCEDURE — 81001 URINALYSIS AUTO W/SCOPE: CPT

## 2019-03-04 RX ORDER — ORPHENADRINE CITRATE 30 MG/ML
60 INJECTION INTRAMUSCULAR; INTRAVENOUS ONCE
Status: COMPLETED | OUTPATIENT
Start: 2019-03-04 | End: 2019-03-04

## 2019-03-04 RX ORDER — METHOCARBAMOL 750 MG/1
750 TABLET, FILM COATED ORAL 3 TIMES DAILY PRN
Qty: 15 TABLET | Refills: 0 | Status: SHIPPED | OUTPATIENT
Start: 2019-03-04 | End: 2019-03-14

## 2019-03-04 RX ORDER — KETOROLAC TROMETHAMINE 30 MG/ML
60 INJECTION, SOLUTION INTRAMUSCULAR; INTRAVENOUS ONCE
Status: COMPLETED | OUTPATIENT
Start: 2019-03-04 | End: 2019-03-04

## 2019-03-04 RX ADMIN — ORPHENADRINE CITRATE 60 MG: 30 INJECTION INTRAMUSCULAR; INTRAVENOUS at 07:46

## 2019-03-04 RX ADMIN — KETOROLAC TROMETHAMINE 60 MG: 60 INJECTION, SOLUTION INTRAMUSCULAR at 07:46

## 2019-03-04 ASSESSMENT — PAIN SCALES - GENERAL
PAINLEVEL_OUTOF10: 8
PAINLEVEL_OUTOF10: 3
PAINLEVEL_OUTOF10: 8

## 2019-03-04 ASSESSMENT — PAIN DESCRIPTION - LOCATION
LOCATION: BACK
LOCATION: BACK

## 2019-03-04 ASSESSMENT — PAIN DESCRIPTION - PAIN TYPE: TYPE: ACUTE PAIN

## 2019-03-06 ENCOUNTER — OFFICE VISIT (OUTPATIENT)
Dept: ORTHOPEDIC SURGERY | Age: 39
End: 2019-03-06
Payer: COMMERCIAL

## 2019-03-06 VITALS
WEIGHT: 244.93 LBS | SYSTOLIC BLOOD PRESSURE: 139 MMHG | HEART RATE: 100 BPM | HEIGHT: 65 IN | DIASTOLIC BLOOD PRESSURE: 85 MMHG | BODY MASS INDEX: 40.81 KG/M2

## 2019-03-06 DIAGNOSIS — M54.50 LUMBAR SPINE PAIN: Primary | ICD-10-CM

## 2019-03-06 DIAGNOSIS — S39.012A STRAIN OF LUMBAR REGION, INITIAL ENCOUNTER: ICD-10-CM

## 2019-03-06 PROCEDURE — 99213 OFFICE O/P EST LOW 20 MIN: CPT | Performed by: PHYSICIAN ASSISTANT

## 2019-03-06 PROCEDURE — G8417 CALC BMI ABV UP PARAM F/U: HCPCS | Performed by: PHYSICIAN ASSISTANT

## 2019-03-06 PROCEDURE — G8427 DOCREV CUR MEDS BY ELIG CLIN: HCPCS | Performed by: PHYSICIAN ASSISTANT

## 2019-03-06 PROCEDURE — L0625 LO FLEX L1-BELOW L5 PRE OTS: HCPCS | Performed by: PHYSICIAN ASSISTANT

## 2019-03-06 PROCEDURE — 1036F TOBACCO NON-USER: CPT | Performed by: PHYSICIAN ASSISTANT

## 2019-03-06 PROCEDURE — G8482 FLU IMMUNIZE ORDER/ADMIN: HCPCS | Performed by: PHYSICIAN ASSISTANT

## 2019-03-06 RX ORDER — VENLAFAXINE HYDROCHLORIDE 75 MG/1
CAPSULE, EXTENDED RELEASE ORAL
COMMUNITY
Start: 2019-02-21 | End: 2019-12-10 | Stop reason: ALTCHOICE

## 2019-03-06 RX ORDER — HYDROCODONE BITARTRATE AND ACETAMINOPHEN 5; 325 MG/1; MG/1
1 TABLET ORAL EVERY 8 HOURS PRN
Qty: 20 TABLET | Refills: 0 | Status: SHIPPED | OUTPATIENT
Start: 2019-03-06 | End: 2019-03-13

## 2019-04-02 ENCOUNTER — OFFICE VISIT (OUTPATIENT)
Dept: PULMONOLOGY | Age: 39
End: 2019-04-02
Payer: COMMERCIAL

## 2019-04-02 VITALS
OXYGEN SATURATION: 98 % | RESPIRATION RATE: 22 BRPM | SYSTOLIC BLOOD PRESSURE: 124 MMHG | HEART RATE: 110 BPM | DIASTOLIC BLOOD PRESSURE: 76 MMHG | HEIGHT: 65 IN | WEIGHT: 260.4 LBS | BODY MASS INDEX: 43.39 KG/M2

## 2019-04-02 DIAGNOSIS — G47.33 OSA ON CPAP: ICD-10-CM

## 2019-04-02 DIAGNOSIS — J30.2 SEASONAL ALLERGIC RHINITIS, UNSPECIFIED TRIGGER: ICD-10-CM

## 2019-04-02 DIAGNOSIS — Z99.89 OSA ON CPAP: ICD-10-CM

## 2019-04-02 DIAGNOSIS — J45.50 UNCOMPLICATED SEVERE PERSISTENT ASTHMA: Primary | ICD-10-CM

## 2019-04-02 PROCEDURE — G8427 DOCREV CUR MEDS BY ELIG CLIN: HCPCS | Performed by: INTERNAL MEDICINE

## 2019-04-02 PROCEDURE — 99214 OFFICE O/P EST MOD 30 MIN: CPT | Performed by: INTERNAL MEDICINE

## 2019-04-02 PROCEDURE — G8417 CALC BMI ABV UP PARAM F/U: HCPCS | Performed by: INTERNAL MEDICINE

## 2019-04-02 PROCEDURE — 1036F TOBACCO NON-USER: CPT | Performed by: INTERNAL MEDICINE

## 2019-04-02 NOTE — PATIENT INSTRUCTIONS
Refused to brice with sleep clinic    Please keep all of your future appointments scheduled by [facility] Pulmonary office. Out of respect for other patients and providers, you may be asked to reschedule your appointment if you arrive later than your scheduled appointment time. Appointments cancelled less than 24hrs in advance will be considered a no show. Patients with three missed appointments within 1 year or four missed appointments within 2 years can be dismissed from the practice.

## 2019-04-02 NOTE — PROGRESS NOTES
consistent with pneumonia. A patchy 9 mm opacity   within the left upper lobe is new from prior exam, and favored to also be   infectious in etiology. 2. Gallstones. 2/15/16 CXR: LLL has cleared and RLL improved but still has basilar ASD. No pleural effusion seen. 4/15/16 CXR:  right elevated hemidiaphragm and right basilar atelectasis    8/31/16 SNIFF test: impaired right diaphragm    2/14/18 cxr: The lungs are without acute focal process.  There is no effusion or   pneumothorax. The cardiomediastinal silhouette is without acute process. The   osseous structures are without acute process. Impression   No acute process. ASSESSMENT:   Asthma - mod persistent. 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 Sharee Galeano 11/16/15, LE edema on lasix, Hiatal hernia s/p fundoplication 5/8076    PLAN:   Dulera 100 mcg BID - reminded to use at least daily.   PRN  Xoponex   PRN tessalon pearls  Continue Singulair   Continue nasal steroid daily and Zyrtec  sinus rinses PRN   Avoid second smoke from her   quit smoking 10/2015; still using nicotine gum -instructed to wean off  Auto CPAP 10-14cm H20. r/s in sleep clinic  F/u in 6 months

## 2019-09-30 ENCOUNTER — TELEPHONE (OUTPATIENT)
Dept: PULMONOLOGY | Age: 39
End: 2019-09-30

## 2019-12-10 ENCOUNTER — OFFICE VISIT (OUTPATIENT)
Dept: PULMONOLOGY | Age: 39
End: 2019-12-10
Payer: COMMERCIAL

## 2019-12-10 VITALS
HEIGHT: 65 IN | DIASTOLIC BLOOD PRESSURE: 76 MMHG | WEIGHT: 281.6 LBS | RESPIRATION RATE: 16 BRPM | SYSTOLIC BLOOD PRESSURE: 118 MMHG | HEART RATE: 83 BPM | OXYGEN SATURATION: 97 % | BODY MASS INDEX: 46.92 KG/M2 | TEMPERATURE: 97.4 F

## 2019-12-10 DIAGNOSIS — J45.50 UNCOMPLICATED SEVERE PERSISTENT ASTHMA: Primary | ICD-10-CM

## 2019-12-10 DIAGNOSIS — J30.2 SEASONAL ALLERGIC RHINITIS, UNSPECIFIED TRIGGER: ICD-10-CM

## 2019-12-10 DIAGNOSIS — G47.33 OSA ON CPAP: ICD-10-CM

## 2019-12-10 DIAGNOSIS — Z99.89 OSA ON CPAP: ICD-10-CM

## 2019-12-10 PROCEDURE — G8417 CALC BMI ABV UP PARAM F/U: HCPCS | Performed by: INTERNAL MEDICINE

## 2019-12-10 PROCEDURE — 1036F TOBACCO NON-USER: CPT | Performed by: INTERNAL MEDICINE

## 2019-12-10 PROCEDURE — G8427 DOCREV CUR MEDS BY ELIG CLIN: HCPCS | Performed by: INTERNAL MEDICINE

## 2019-12-10 PROCEDURE — G8484 FLU IMMUNIZE NO ADMIN: HCPCS | Performed by: INTERNAL MEDICINE

## 2019-12-10 PROCEDURE — 99214 OFFICE O/P EST MOD 30 MIN: CPT | Performed by: INTERNAL MEDICINE

## 2019-12-10 RX ORDER — LEVALBUTEROL TARTRATE 45 UG/1
1-2 AEROSOL, METERED ORAL EVERY 6 HOURS PRN
Qty: 1 INHALER | Refills: 5 | Status: SHIPPED | OUTPATIENT
Start: 2019-12-10 | End: 2020-10-12

## 2019-12-10 RX ORDER — CETIRIZINE HYDROCHLORIDE 10 MG/1
10 TABLET ORAL DAILY
Qty: 30 TABLET | Refills: 0 | Status: SHIPPED | OUTPATIENT
Start: 2019-12-10 | End: 2020-01-21

## 2020-01-21 RX ORDER — CETIRIZINE HYDROCHLORIDE 10 MG/1
TABLET ORAL
Qty: 30 TABLET | Refills: 5 | Status: SHIPPED | OUTPATIENT
Start: 2020-01-21 | End: 2020-11-30

## 2020-04-27 ENCOUNTER — TELEPHONE (OUTPATIENT)
Dept: PULMONOLOGY | Age: 40
End: 2020-04-27

## 2020-04-27 NOTE — TELEPHONE ENCOUNTER
Spoke with pt to offer VV, pt is currently at [facility] respiratory clinic\" and needs to call back.

## 2020-04-27 NOTE — TELEPHONE ENCOUNTER
COVID testing negative at [facility].    Do you have the following symptoms? Shortness of Breath  yes  Wheezing  yes  Cough  Yes-deep in her chest                  Cough Characteristics:                           Productive    no                           Sputum Color    no                           Hemoptysis   no                           Consistency of sputum   no     Fever:    99    Chills/Sweats:  chills  What other symptoms are you having?:  Diarrhea, sore throat, headache, tired    How long have you had these symptoms? 4/23/20     Pharmacy: [facility]          Review medications and allergies: Allergies? Allergies   Allergen Reactions    Other Dermatitis     BANDAIDS, ADHESIVES     Penicillins Swelling    Percocet [Oxycodone-Acetaminophen] Rash    Sulfa Antibiotics Rash                        Currently on Antibiotics? (Drug/Dose/Frequency and how long on?) no                   Systemic Steroids? (Drug/Dose/Frequency and how long on?) no       ASSESSMENT: 12/10/19  · Asthma - mod persistent. 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 Medina Alhaji 11/16/15, LE edema on lasix, Hiatal hernia s/p fundoplication 4/9667     PLAN:   · Dulera 100 mcg BID - reminded to use at least daily.   · PRN  Xoponex   · Rx for inhaler refills x 6  · PRN tessalon pearlsl  · Refill flonase and zyrtec%%%  · sinus rinses PRN   · Avoid second smoke from her   · quit smoking 10/2015; still using nicotine gum -instructed to wean off  · Auto CPAP 10-14cm H20. r/s in sleep clinic and restart CPAP  · F/u in 6 months

## 2020-06-03 ENCOUNTER — TELEPHONE (OUTPATIENT)
Dept: PULMONOLOGY | Age: 40
End: 2020-06-03

## 2020-06-03 NOTE — TELEPHONE ENCOUNTER

## 2020-06-10 ENCOUNTER — VIRTUAL VISIT (OUTPATIENT)
Dept: PULMONOLOGY | Age: 40
End: 2020-06-10
Payer: COMMERCIAL

## 2020-06-10 PROCEDURE — 99214 OFFICE O/P EST MOD 30 MIN: CPT | Performed by: INTERNAL MEDICINE

## 2020-06-10 RX ORDER — BENZONATATE 200 MG/1
200 CAPSULE ORAL 3 TIMES DAILY PRN
Qty: 90 CAPSULE | Refills: 1 | Status: SHIPPED | OUTPATIENT
Start: 2020-06-10

## 2020-06-10 NOTE — PROGRESS NOTES
no vitals taken for this visit. Constitutional:  No acute distress. Appears well developed and nourished. Eyes: EOM intact. Conjunctivae anicteric. No visible discharge. HENT: Head is normocephalic and atraumatic. Mucus membranes are moist and the tongue appears normal. Normal appearing nose. External Ears normal.   Neck: No obvious mass and the trachea is midline. Respiratory: No accessory muscle usage. Respiratory effort normal. No visualized signs of difficulty breathing or respiratory distress  Psychiatric: No anxiety or Agitation. Alert and Oriented to person, place and time. Normal judgement and insight. LABS:  Reviewed any pertinent new labs that are available.   Neg aspergillus Ab    PFTs 8/31/16 FVC  (64%) FEV1 1.97 (62%) FEV1/FVC ratio    TLC 2.24 (64%)  RV  (%)   DLCO (70%) Bronchodilator response: no    Allergic to digs, weeds, trees, molds    IMAGING:  I personally reviewed and interpreted the following today in the office:   10/19/15 Chest CT:  Impression: Small multifocal groundglass opacities in the lungs   predominantly the upper lobes. Small multifocal pneumonias are   suspected. Bronchial wall thickening is also present in the upper   lungs probably due to a superimposed acute bronchitis. No pleural   effusion. Cholelithiasis. 2/7/16 Chest CT: IMPRESSION:   1. Consolidation in both lower lobes, right greater than left, with small   right pleural effusion, consistent with pneumonia. A patchy 9 mm opacity   within the left upper lobe is new from prior exam, and favored to also be   infectious in etiology. 2. Gallstones. 2/15/16 CXR: LLL has cleared and RLL improved but still has basilar ASD. No pleural effusion seen. 4/15/16 CXR:  right elevated hemidiaphragm and right basilar atelectasis    8/31/16 SNIFF test: impaired right diaphragm    2/14/18 cxr: The lungs are without acute focal process.  There is no effusion or   pneumothorax.  The cardiomediastinal

## 2020-08-04 ENCOUNTER — OFFICE VISIT (OUTPATIENT)
Dept: ORTHOPEDIC SURGERY | Age: 40
End: 2020-08-04
Payer: COMMERCIAL

## 2020-08-04 VITALS — WEIGHT: 281 LBS | HEIGHT: 65 IN | BODY MASS INDEX: 46.82 KG/M2

## 2020-08-04 PROCEDURE — 99213 OFFICE O/P EST LOW 20 MIN: CPT | Performed by: PHYSICIAN ASSISTANT

## 2020-08-04 PROCEDURE — G8417 CALC BMI ABV UP PARAM F/U: HCPCS | Performed by: PHYSICIAN ASSISTANT

## 2020-08-04 PROCEDURE — 1036F TOBACCO NON-USER: CPT | Performed by: PHYSICIAN ASSISTANT

## 2020-08-04 PROCEDURE — G8427 DOCREV CUR MEDS BY ELIG CLIN: HCPCS | Performed by: PHYSICIAN ASSISTANT

## 2020-08-04 RX ORDER — DICLOFENAC SODIUM 75 MG/1
75 TABLET, DELAYED RELEASE ORAL 2 TIMES DAILY WITH MEALS
Qty: 40 TABLET | Refills: 0 | Status: SHIPPED | OUTPATIENT
Start: 2020-08-04 | End: 2021-07-26

## 2020-08-04 RX ORDER — CYCLOBENZAPRINE HCL 10 MG
10 TABLET ORAL NIGHTLY PRN
Qty: 10 TABLET | Refills: 0 | Status: SHIPPED | OUTPATIENT
Start: 2020-08-04 | End: 2020-08-14

## 2020-08-04 NOTE — PROGRESS NOTES
CHIEF COMPLAINT:    Chief Complaint   Patient presents with    Shoulder Pain     Patient states that she was trying to climb into her [place] and she slipped. Pain into anterior aspect of left arm. HISTORY OF PRESENT ILLNESS:                The patient is a 38 y.o. female who presents to clinic for evaluation of left shoulder pain. She states she was pulling herself up into her [place] with a handrail when she slipped and fell from the [place]. She reports that the shoulder was pulled behind her. Since that time, she has had significant weakness and limited range of motion of this arm. She has had multiple surgeries on the contralateral shoulder and has some permanent restrictions with her right shoulder. Past Medical History:   Diagnosis Date    Anesthesia complication     severe hypotension with block    Anxiety and depression     Arthritis     Asthma     Chronic bronchitis (HCC)     Fibromyalgia     Hypertension     Migraine     Pneumonia     Rash     Reflux     Sleep apnea     Wears glasses         Work Status: Unemployed    The pain assessment was noted & is as follows:  Pain Assessment  Location of Pain: Shoulder  Location Modifiers: Left  Severity of Pain: 8  Quality of Pain: Dull, Aching  Duration of Pain: Persistent  Frequency of Pain: Constant  Aggravating Factors:  Other (Comment)  Limiting Behavior: Yes  Relieving Factors: Rest  Result of Injury: Yes  Work-Related Injury: No  Are there other pain locations you wish to document?: No]      Work Status/Functionality:     Past Medical History: Medical history form was reviewed today & can be found in the media tab  Past Medical History:   Diagnosis Date    Anesthesia complication     severe hypotension with block    Anxiety and depression     Arthritis     Asthma     Chronic bronchitis (HCC)     Fibromyalgia     Hypertension     Migraine     Pneumonia     Rash     Reflux     Sleep apnea     Wears glasses       Past Surgical History:     Past Surgical History:   Procedure Laterality Date    ABDOMEN SURGERY      BRONCHOSCOPY  16     SECTION      x 2    COLONOSCOPY      normal per pt    CYST REMOVAL      DENTAL SURGERY      teeth removed    ENDOSCOPY, COLON, DIAGNOSTIC      GASTRIC FUNDOPLICATION  8867    HYSTEROSCOPY  09/10/2015    Hysteroscopy, dilation and curettage with the MyoSure, NovaSure    OTHER SURGICAL HISTORY  9/10/15    HYSTEROSCOPY, DILATATION AND CURETTAGE WITH MYOSURE, NOVASURE    SHOULDER SURGERY      right    THROAT SURGERY Right 2015    vocal cord stripping    TUBAL LIGATION      UPPER GASTROINTESTINAL ENDOSCOPY  13    gastritis, hiatal hernia, hemorrhoids    UPPER GASTROINTESTINAL ENDOSCOPY      10/2015    UPPER GASTROINTESTINAL ENDOSCOPY  2016    URETHRA SURGERY       Current Medications:     Current Outpatient Medications:     cyclobenzaprine (FLEXERIL) 10 MG tablet, Take 1 tablet by mouth nightly as needed for Muscle spasms, Disp: 10 tablet, Rfl: 0    diclofenac (VOLTAREN) 75 MG EC tablet, Take 1 tablet by mouth 2 times daily (with meals), Disp: 40 tablet, Rfl: 0    benzonatate (TESSALON) 200 MG capsule, Take 1 capsule by mouth 3 times daily as needed for Cough, Disp: 90 capsule, Rfl: 1    cetirizine (ZYRTEC) 10 MG tablet, TAKE ONE TABLET BY MOUTH DAILY, Disp: 30 tablet, Rfl: 5    mometasone-formoterol (DULERA) 100-5 MCG/ACT inhaler, INHALE 2 PUFFS TWICE DAILY, Disp: 1 Inhaler, Rfl: 5    levalbuterol (XOPENEX HFA) 45 MCG/ACT inhaler, Inhale 1-2 puffs into the lungs every 6 hours as needed for Wheezing or Shortness of Breath, Disp: 1 Inhaler, Rfl: 5    ondansetron (ZOFRAN ODT) 4 MG disintegrating tablet, Take 1 tablet by mouth every 8 hours as needed for Nausea or Vomiting, Disp: 12 tablet, Rfl: 0    metFORMIN (GLUCOPHAGE) 500 MG tablet, Take 500 mg by mouth daily (with breakfast), Disp: , Rfl:     esomeprazole Magnesium (NEXIUM) 20 MG PACK, Take 20 mg by mouth 2 times daily , Disp: , Rfl:   Allergies: Other; Penicillins; Morphine and related; Oxycodone-acetaminophen; Percocet [oxycodone-acetaminophen]; and Sulfa antibiotics  Social History:    reports that she quit smoking about 4 years ago. Her smoking use included cigarettes. She has a 44.00 pack-year smoking history. She has never used smokeless tobacco. She reports current alcohol use. She reports that she does not use drugs. Family History:   Family History   Problem Relation Age of Onset    Mental Illness Mother         suicide    Diabetes Father     High Blood Pressure Father     Kidney Disease Father     High Cholesterol Father     Asthma Neg Hx     Cancer Neg Hx     Emphysema Neg Hx     Heart Failure Neg Hx     Hypertension Neg Hx        REVIEW OF SYSTEMS:   For new problems, a full review of systems will be found scanned in the patient's chart. CONSTITUTIONAL: Denies unexplained weight loss, fevers, chills   NEUROLOGICAL: Denies unsteady gait or progressive weakness  SKIN: Denies skin changes, delayed healing, rash, itching       PHYSICAL EXAM:    Vitals: Height 5' 5\" (1.651 m), weight 281 lb (127.5 kg), not currently breastfeeding. GENERAL EXAM:  · General Apparence: Patient is adequately groomed with no evidence of malnutrition. · Orientation: The patient is oriented to time, place and person. · Mood & Affect:The patient's mood and affect are appropriate       Left shoulder PHYSICAL EXAMINATION:  · Inspection: No visible deformity. · Palpation: Tenderness to palpation at the subacromial space and deltoid insertion    · Range of Motion: Active range of motion is severely limited. She can perform approximately 40 degrees of active abduction. Passively, I can bring her to approximately 90 degrees of abduction.     · Strength: Isolated supraspinatus strength testing reveals significant weakness when compared to the contralateral side    · Special Tests: Limited by pain          · Skin:  There are no rashes, ulcerations or lesions. · There are no dysvascular changes     Gait & station: Normal gait      Additional Examinations:        Right Upper Extremity:  Examination of the right upper extremity does not show any tenderness, deformity or injury. Range of motion is unremarkable. There is no gross instability. There are no rashes, ulcerations or lesions. Strength and tone are normal.      Diagnostic Testing: The following x rays were read and interpreted by myself      1.  4 x-rays of the left shoulder were taken today and reveal normal anatomy with no acute fractures    Orders     Orders Placed This Encounter   Procedures    XR SHOULDER LEFT (MIN 2 VIEWS)     Standing Status:   Future     Number of Occurrences:   1     Standing Expiration Date:   9/4/2020    MRI SHOULDER LEFT WO CONTRAST     Standing Status:   Future     Standing Expiration Date:   8/4/2021     Scheduling Instructions:      Sahra Stoner      [phone] [address] [place] [address]; [address], [address]            PUSH TO [facility] PACS            PATIENT TO CALL AND SCHEDULE WHEN MRI APPROVED     Order Specific Question:   Reason for exam:     Answer:   R/O RTC TEAR    So 8 ab rstr can/web pre ots     Patient was prescribed a Breg Shure Shoulder Immobilizer. The left shoulder will require stabilization / immobilization from this orthosis. The orthosis will assist in protecting the affected area, provide functional support and facilitate healing. The patient was educated and fit by a healthcare professional with expert knowledge and specialization in brace application while under the direct supervision of the treating physician. Verbal and written instructions for the use of and application of this item were provided. They were instructed to contact the office immediately should the brace result in increased pain, decreased sensation, increased swelling or worsening of the condition. Assessment / Treatment Plan:     1. Left shoulder rotator cuff tear    Given her history of injury and significant clinical limitations today, I am going to obtain an MRI to rule out rotator cuff tear. She is going to follow-up with Dr. Lamar Mittal. She was provided with a sling today for comfort, Voltaren 75 mg and Flexeril she can take nightly for spasms. Side effects of these medications were discussed and she has taken them in the past.    I spent 15 minutes face-to-face with the patient and greater than 50% of that time was spent counseling/coordinating care for the above-stated diagnosis      [facility]istaPal[facility]    This dictation was performed with a verbal recognition program (DRAGON) and it was checked for errors. It is possible that there are still dictated errors within this office note. If so, please bring any errors to my attention for an addendum. All efforts were made to ensure that this office note is accurate.

## 2020-08-06 ENCOUNTER — TELEPHONE (OUTPATIENT)
Dept: ORTHOPEDIC SURGERY | Age: 40
End: 2020-08-06

## 2020-08-18 ENCOUNTER — OFFICE VISIT (OUTPATIENT)
Dept: ORTHOPEDIC SURGERY | Age: 40
End: 2020-08-18
Payer: COMMERCIAL

## 2020-08-18 ENCOUNTER — HOSPITAL ENCOUNTER (OUTPATIENT)
Dept: PHYSICAL THERAPY | Age: 40
Setting detail: THERAPIES SERIES
Discharge: HOME | End: 2020-08-18
Payer: COMMERCIAL

## 2020-08-18 VITALS — BODY MASS INDEX: 46.82 KG/M2 | HEIGHT: 65 IN | WEIGHT: 281 LBS

## 2020-08-18 PROCEDURE — G8417 CALC BMI ABV UP PARAM F/U: HCPCS | Performed by: ORTHOPAEDIC SURGERY

## 2020-08-18 PROCEDURE — G8427 DOCREV CUR MEDS BY ELIG CLIN: HCPCS | Performed by: ORTHOPAEDIC SURGERY

## 2020-08-18 PROCEDURE — 99242 OFF/OP CONSLTJ NEW/EST SF 20: CPT | Performed by: ORTHOPAEDIC SURGERY

## 2020-08-18 RX ORDER — MELOXICAM 15 MG/1
15 TABLET ORAL DAILY
Qty: 90 TABLET | Refills: 0 | Status: SHIPPED | OUTPATIENT
Start: 2020-08-18 | End: 2020-11-16

## 2020-08-19 NOTE — PROGRESS NOTES
device    Spine good cervical rotation    Additional Comments:         Radiology:     She has a high-quality MRI available for review. Imaging demonstrates concentric glenohumeral alignment. There is some strain and low-grade interstitial tearing of the supraspinatus but no compromise of the footprint. She has a superior labral lesion with some extent into the anterior-inferior quadrant but good integrity of the capsule. Assessment : Anterior superior glenoid labral tear, rotator cuff strain, mild scapular dyskinesis. Underlying diabetes and will observe for any evidence of developing adhesive capsulitis      Office Procedures:  Orders Placed This Encounter   Procedures    OSR PT [facility] Physical Therapy     Referral Priority:   Routine     Referral Type:   Eval and Treat     Referral Reason:   Specialty Services Required     Requested Specialty:   Physical Therapy     Number of Visits Requested:   1       Treatment Plan: We had a very nice visit today reviewing the anatomy and function of the shoulder. We reviewed the results of her MRI and physical exam.  Presently given her findings and exam I think she has an excellent prognosis with conservative care. I suggested that we should utilize an anti-inflammatory program which she is already on. Combine this with some physical therapy which will focus on scapular stability, some early rotator cuff activation and glenohumeral stability. I think if she continues this for a good 4 to 6 weeks she will see real improvements. If not we will see her back and discuss further course of treatment. We appreciate the opportunity to care for this patient. The trust that is implicit in this referral does not go unnoticed. We will do our very best to provide high-quality care that goes above and beyond standards. Please feel free contact us with any questions or concerns about this patient.               [address] [facility] [place] Partner of [address] [facility]  Shoulder and Sports Medicine Surgery     This dictation was performed with a verbal recognition program [facility] ) and it was checked for errors. It is possible that there are still dictated errors within this office note. If so, please bring any errors to my attention for an addendum. All efforts were made to ensure that this office note is accurate.

## 2020-08-28 ENCOUNTER — OFFICE VISIT (OUTPATIENT)
Dept: ORTHOPEDIC SURGERY | Age: 40
End: 2020-08-28
Payer: COMMERCIAL

## 2020-08-28 VITALS — WEIGHT: 281 LBS | HEIGHT: 65 IN | BODY MASS INDEX: 46.82 KG/M2

## 2020-08-28 PROCEDURE — 99214 OFFICE O/P EST MOD 30 MIN: CPT | Performed by: ORTHOPAEDIC SURGERY

## 2020-08-28 PROCEDURE — G8427 DOCREV CUR MEDS BY ELIG CLIN: HCPCS | Performed by: ORTHOPAEDIC SURGERY

## 2020-08-28 PROCEDURE — 20610 DRAIN/INJ JOINT/BURSA W/O US: CPT | Performed by: ORTHOPAEDIC SURGERY

## 2020-08-28 PROCEDURE — 1036F TOBACCO NON-USER: CPT | Performed by: ORTHOPAEDIC SURGERY

## 2020-08-28 PROCEDURE — G8417 CALC BMI ABV UP PARAM F/U: HCPCS | Performed by: ORTHOPAEDIC SURGERY

## 2020-08-28 RX ORDER — TRIAMCINOLONE ACETONIDE 40 MG/ML
80 INJECTION, SUSPENSION INTRA-ARTICULAR; INTRAMUSCULAR ONCE
Status: COMPLETED | OUTPATIENT
Start: 2020-08-28 | End: 2020-08-28

## 2020-08-28 RX ORDER — BUPIVACAINE HYDROCHLORIDE 2.5 MG/ML
2 INJECTION, SOLUTION INFILTRATION; PERINEURAL ONCE
Status: COMPLETED | OUTPATIENT
Start: 2020-08-28 | End: 2020-08-28

## 2020-08-28 RX ADMIN — BUPIVACAINE HYDROCHLORIDE 5 MG: 2.5 INJECTION, SOLUTION INFILTRATION; PERINEURAL at 11:23

## 2020-08-28 RX ADMIN — TRIAMCINOLONE ACETONIDE 80 MG: 40 INJECTION, SUSPENSION INTRA-ARTICULAR; INTRAMUSCULAR at 11:23

## 2020-08-28 NOTE — PROGRESS NOTES
Department of Orthopedic Surgery   Progress Note      Follow-Up: Left shoulder pain    Subjective:     Chris Kimbrough is a 54-year-old female that we saw on August 18. She has a complex medical history including being diagnosed with fibromyalgia as well as carrying diagnosis of diabetes. Admits her blood sugar control has not been good recently. She has a history of a right shoulder arthroscopy which has left her with some persistent pain in the right shoulder. She has the diagnosis of a left hip pain, lumbar pain and recalcitrant plantar fasciitis. Much of her pathology has been bilateral.  She apparently saw a rheumatologist at some point in the past.  She reports that her inflammatory markers were elevated at that time. She had an MRI of her left shoulder that demonstrated some very low-grade partial-thickness rotator cuff damage less than 25%. Perhaps some subtle fraying of the labrum anterior superior. I have been concerned about the possibility of developing adhesive capsulitis given her diabetes    She reports that she has been unable to tolerate physical therapy. Symptoms are severe and affecting her daily life. However she allows that her symptoms in the left shoulder are not much different than the right. She is also very limited by her hip and back. Objective:     @IPVITALS(24)@    Review of Systems  Pertinent items are noted in HPI  Denies fever, chills, confusion, bowel/bladder active change. Review of systems reviewed from Patient History Form dated on August 28 and available in the patient's chart under the Media tab. Pain Assessment  Location of Pain: Shoulder  Location Modifiers: Right, Left  Severity of Pain: 6  Quality of Pain: Dull, Aching, Throbbing  Duration of Pain: Persistent  Frequency of Pain: Constant  Aggravating Factors: Bending, Stretching, Straightening, Exercise    Exam: On exam today she has no overlying skin changes such as erythema or warmth.   She has some tenderness along both the rotator interval anteriorly as well as the posterior joint line. She essentially has pain with most provocative maneuvers including dynamic labral shear, Jobes and O'Briens. However she has no focal deficits and isometric strength just limited by her pain and effort. I detect no instability on sulcus or load shift testing. Imaging: I reviewed again her MRI    Office Procedures:  After careful consideration of the risks and benefits, the left shoulder   subacromial   joint was injected under sterile conditions and well-tolerated. The skin was sterilized initially with alcohol and subsequently with Betadine. 80 mg of triamcinalone and 2 mL of quarter percent plain Marcaine were utilized and injected with a 21-gauge needle. Injection was well tolerated. No local skin reactions. No adverse events. Initial response assessed. Please see associated injection template for Mojgan Cherrytonegarcía 47 #. Assessment:     #1. Left shoulder pain with potential causes including partial-thickness rotator cuff lesion, low-grade, and superior labral lesion. However clinical presentation consistent with significant myofascial pain as well    #2. Multifocal inflammatory myofascial pain including the right shoulder as well with minimal improvement after previous surgery, left hip, low back and bilateral feet. .     Plan:      1: We had a quite lengthy visit today with Mrs. Penny Hebert. I recommended that we take a more holistic view of her overall pain inflammation and health. I recommended that we look at her nutrition, exercise and fingerstick blood sugar control. I think she could get some benefit from consultation with a rheumatologist regarding addressing elevated inflammatory markers and also a holistic look at this. Specifically with regard to the shoulder I recommended a diagnostic and therapeutic injection today.   Like to see whether this helps and assists her in performing her therapy as well as relieving her symptoms. This was well-tolerated today in the office. I do think she could benefit from some ongoing stretching and scapular and postural improvement. We will continue to observe for signs of frozen shoulder. Generally, we will see her back as needed based upon her symptoms. I do strongly recommend that we continue conservative treatment is much as possible. I spent 25+ minutes with the patient including 13+ minutes face to face with the patient discussing and answering questions regarding the risks, benefits, and complications of fibromyalgia and inflammatory disease in detail. We talked about the importance of her own holistic self-care and exercise         Follow-Up Visit:  prn            110 [facility] and Sports Medicine Surgery      This dictation was performed with a verbal recognition program (DRAGON) and it was checked for errors. It is possible that there are still dictated errors within this office note. If so, please bring any errors to my attention for an addendum. All efforts were made to ensure that this office note is accurate.

## 2020-09-01 ENCOUNTER — OFFICE VISIT (OUTPATIENT)
Dept: RHEUMATOLOGY | Age: 40
End: 2020-09-01
Payer: COMMERCIAL

## 2020-09-01 VITALS — WEIGHT: 281 LBS | HEIGHT: 65 IN | TEMPERATURE: 97.3 F | BODY MASS INDEX: 46.82 KG/M2

## 2020-09-01 PROCEDURE — G8417 CALC BMI ABV UP PARAM F/U: HCPCS | Performed by: INTERNAL MEDICINE

## 2020-09-01 PROCEDURE — 99244 OFF/OP CNSLTJ NEW/EST MOD 40: CPT | Performed by: INTERNAL MEDICINE

## 2020-09-01 PROCEDURE — G8427 DOCREV CUR MEDS BY ELIG CLIN: HCPCS | Performed by: INTERNAL MEDICINE

## 2020-09-01 RX ORDER — PREGABALIN 50 MG/1
50 CAPSULE ORAL 2 TIMES DAILY
Qty: 60 CAPSULE | Refills: 2 | Status: SHIPPED | OUTPATIENT
Start: 2020-09-01 | End: 2020-12-28

## 2020-09-01 NOTE — PROGRESS NOTES
[address], MD                                                           [address] [place] [address] Allie 1019 (f) [phone] (F)      Dear Dr. Taina Che MD:  Please find Rheumatology assessment. Thank you for giving me the opportunity to be involved in [facility] and I look forward following Pauline along with you. If you have any questions or concerns please feel free to reach me. Note is transcribed using voice recognition software. Inadvertent computerized transcription errors may be present. Patient identification: Guevara Hernandez: [id number],51 y.o. Sex: female     A/P  Roe Ma was seen today for joint pain. Diagnoses and all orders for this visit:    Fibromyalgia  -     pregabalin (LYRICA) 50 MG capsule; Take 1 capsule by mouth 2 times daily for 30 days. Morbid obesity with BMI of 45.0-49.9, adult ([facility] [address]    Screening for rheumatic disorder      Chronic musculoskeletal discomfort is from fibromyalgia which is compounded by morbid obesity, obstructive sleep apnea uncorrected and osteoarthritis. No evidence of systemic inflammatory arthritis. RF, CCP, STACY inflammatory markers are normal.       Plan-   Fibromyalgia-Discussed about diagnosis, pathophysiology and management options. Patient was made aware that fibromyalgia is pain perception disorder rather than organic structural disorder. It is a non-joint , non-life-threatening condition which requires active participations in terms of physical reconditioning from patients. Went over medical management as well as physical reconditioning with aquatic therapy/PT and self exercises.  We went over various FDA approved medications- Lyrica, Savella and Cymbalta, also discussed about off level use of muscle relaxants. I gave her written information on FMS. In her case-morbid obesity and uncorrected sleep apnea are the main comorbidities that need to be addressed. These are definitely making her fibromyalgia much worse, and causing a lot of mechanical musculoskeletal discomfort. Treatment expectations were discussed with patient as medications have small role to play, and largely her motivation to address comorbidities including morbid obesity and sleep apnea. We also talked about dietary measures, daily exercises. She was not too convinced that these are factors playing a role in chronic pain, believes that it could be inflammation believes that she has inflammation all over the body. She tried Cymbalta did not help, Effexor did not help, gabapentin did not help, does not recall taking Lyrica although her previous rheumatology note mentioned that she did take Lyrica. We can certainly try Lyrica 50 mg twice daily, side effects and directions explained however she will probably not get good results unless compounding factors are addressed. All her questions were answered at my best capacity. I recommend that she follows up with primary care physician for further management of fibromyalgia and chronic pain. I have nothing much to add in her care as she does not have inflammatory arthritis. Patient indicates understanding and agrees with the management plan. I reviewed patient's history, referral documents and electronic medical records including outside rheumatology and also notes. Copy of consult note is being routed electronically/faxed to referring physician. #######################################################################    REASON FOR CONSULTATION:  Patient is being seen at the request of  Rip Hutchison MD / Trace Tao MD for evaluation management of chronic pain.     HISTORY OF PRESENT ILLNESS:  38 y.o. female with sleep Other Topics Concern    Not on file   Social History Narrative    Not on file       No family history of autoimmune diseases    Current Outpatient Medications   Medication Sig Dispense Refill    pregabalin (LYRICA) 50 MG capsule Take 1 capsule by mouth 2 times daily for 30 days. 60 capsule 2    meloxicam (MOBIC) 15 MG tablet Take 1 tablet by mouth daily 90 tablet 0    diclofenac (VOLTAREN) 75 MG EC tablet Take 1 tablet by mouth 2 times daily (with meals) 40 tablet 0    benzonatate (TESSALON) 200 MG capsule Take 1 capsule by mouth 3 times daily as needed for Cough 90 capsule 1    cetirizine (ZYRTEC) 10 MG tablet TAKE ONE TABLET BY MOUTH DAILY 30 tablet 5    mometasone-formoterol (DULERA) 100-5 MCG/ACT inhaler INHALE 2 PUFFS TWICE DAILY 1 Inhaler 5    levalbuterol (XOPENEX HFA) 45 MCG/ACT inhaler Inhale 1-2 puffs into the lungs every 6 hours as needed for Wheezing or Shortness of Breath 1 Inhaler 5    ondansetron (ZOFRAN ODT) 4 MG disintegrating tablet Take 1 tablet by mouth every 8 hours as needed for Nausea or Vomiting 12 tablet 0    metFORMIN (GLUCOPHAGE) 500 MG tablet Take 500 mg by mouth daily (with breakfast)      esomeprazole Magnesium (NEXIUM) 20 MG PACK Take 20 mg by mouth 2 times daily        No current facility-administered medications for this visit. Allergies   Allergen Reactions    Other Dermatitis     BANDAIDS, ADHESIVES     Penicillins Swelling    Morphine And Related Other (See Comments)     nausea and vomiting      Oxycodone-Acetaminophen Rash    Percocet [Oxycodone-Acetaminophen] Rash    Sulfa Antibiotics Rash       PHYSICAL EXAM:    Vitals:    Temp 97.3 °F (36.3 °C) (Skin)   Ht 5' 5\" (1.651 m)   Wt 281 lb (127.5 kg)   BMI 46.76 kg/m²   General appearance/ Psychiatric: well nourished, and well groomed, normal judgement, alert, appears stated age and cooperative.    MKS: Extremely sensitive to pain, has generalized hyperesthesia even with superficial pressure wherever I touch.  I do not appreciate any tender, swollen or inflamed joints in upper or lower extremities, full range of motion all peripheral joints. Normal gait, muscle strength in upper and lower extremities. Skin: No rashes, no induration or skin thickening or nodules. No evidence ischemia or deformities noted in digits or nails. HEENT:short neck, normal lids, lacrimal glands and pupils. No oral or nasal ulcers. Salivary glands reveal no evidence of abnormality. External inspection of the ears and nose within normal limits. Neck: No masses or asymmetry. No thyroid enlargement. Chest: Normal effort, clear to auscultation. Heart:  Normal s1/s2, no leg edema. Neurologic: normal deep tendon reflexes. No foot or wrist drop. DATA:   Lab Results   Component Value Date    WBC 11.1 (H) 01/10/2019    HGB 13.4 01/10/2019    HCT 41.7 01/10/2019    MCV 82.0 01/10/2019     01/10/2019         Chemistry        Component Value Date/Time     01/10/2019 2047    K 4.0 01/10/2019 2047    CL 99 01/10/2019 2047    CO2 29 01/10/2019 2047    BUN 12 01/10/2019 2047    CREATININE 0.7 01/10/2019 2047        Component Value Date/Time    CALCIUM 9.2 01/10/2019 2047    ALKPHOS 95 01/10/2019 2047    AST 12 (L) 01/10/2019 2047    ALT 13 01/10/2019 2047    BILITOT <0.2 01/10/2019 2047          No results found for: Odalis Mckeon  No results found for: SEDRATE  No results found for: CRP  No results found for: STACY, RHEUMFACTOR, SEDRATE  No results found for: CKTOTAL  Lab Results   Component Value Date    TSH 3.09 05/08/2013          A/P- See above.

## 2020-09-01 NOTE — PATIENT INSTRUCTIONS
Sleep Apnea  Aquatic therapy  Wt managment      Fibromyalgia    Fibromyalgia is a common health problem that causes widespread pain and tenderness (sensitive to touch). The pain and tenderness tend to come and go, and move about the body. Most often, people with this chronic (long-term) illness are fatigued (very tired) and have sleep problems. It can be hard to diagnose fibromyalgia. Fast facts  Fibromyalgia affects two to four percent of people, mostly women. Doctors diagnose fibromyalgia based on all the patient's relevant symptoms (what you feel), no longer just on the number of tender points. There is no test to detect this disease, but you may need lab tests or X-rays to rule out other health problems. Though there is no cure, medications can relieve symptoms. Patients also may feel better with proper self-care, such as exercise and getting enough sleep. What is fibromyalgia? Fibromyalgia is a chronic health problem that causes pain all over the body and other symptoms. Other symptoms that patients most often have are:  Tenderness to touch or pressure affecting joints and muscles   Fatigue   Sleep problems (waking up unrefreshed)   Problems with memory or thinking clearly  Some patients also may have:  Depression or anxiety   Migraine or tension headaches   Digestive problems: irritable bowel syndrome (commonly called IBS) or gastroesophageal reflux disease (often referred to as GERD)   Irritable or overactive bladder   Pelvic pain   Temporomandibular disorder--often called TMJ (a set of symptoms including face or jaw pain, jaw clicking and ringing in the ears)  Symptoms of fibromyalgia and its related problems can vary in intensity, and will wax and wane over time. Stress often worsens the symptoms. What causes fibromyalgia? The causes of fibromyalgia are unclear. They may be different in different people. Fibromyalgia may run in families.  There likely are certain genes that can make people more prone to getting fibromyalgia and the other health problems that can occur with it. Genes alone, though, do not cause fibromyalgia. There is most often some triggering factor that sets off fibromyalgia. It may be spine problems, arthritis, injury, or other type of physical stress. Emotional stress also may trigger this illness. The result is a change in the way the body \"talks\" with the spinal cord and brain. Levels of brain chemicals and proteins may change. For the person with fibromyalgia, it is as though the \"volume control\" is turned up too high in the brain's pain processing centers. WHO GETS FIBROMYALGIA? Fibromyalgia is most common in women, though it can occur in men. It most often starts in middle adulthood, but can occur in the teen years and in old age. Younger children can also develop widespread body pain and fatigue. You are at higher risk for fibromyalgia if you have a rheumatic disease (health problem that affects the joints, muscles and bones). These include osteoarthritis, lupus, rheumatoid arthritis, or ankylosing spondylitis. How is fibromyalgia diagnosed? A doctor will suspect fibromyalgia based on your symptoms. Doctors may require that you have tenderness to pressure or tender points at a specific number of certain spots before saying you have fibromyalgia, but they are not required to make the diagnosis (see the Box). A physical exam can be helpful to detect tenderness and to exclude other causes of muscle pain. There are no diagnostic tests (such as X-rays or blood tests) for this problem. Yet, you may need tests to rule out another health problem that can be confused with fibromyalgia. Because widespread pain is the main feature of fibromyalgia, health care providers will ask you to describe your pain. This may help tell the difference between fibromyalgia and other diseases with similar symptoms.  For instance, hypothyroidism (underactive thyroid gland) and polymyalgia rheumatica sometimes mimic fibromyalgia. Yet, certain blood tests can tell if you have either of these problems. Sometimes, fibromyalgia is confused with rheumatoid arthritis or lupus. But, again, there is a difference in the symptoms, physical findings and blood tests that will help your health care provider detect these health problems. Unlike fibromyalgia, these rheumatic diseases cause inflammation in the joints and tissues. Criteria Needed for a Fibromyalgia Diagnosis   1. Pain and symptoms over the past week, based on the total of:  Number of painful areas out of 18 parts of the body  Plus level of severity of these symptoms:  Fatigue   Waking unrefreshed   Cognitive (memory or thought) problems  Plus number of other general physical symptoms   2. Symptoms lasting at least three months at a similar level   3. No other health problem that would explain the pain and other symptoms   Source: [employer] Rheumat[employer], 2010   How is fibromyalgia treated? There is no cure for fibromyalgia. However, symptoms can be treated with both medication and non-drug treatments. Many times the best outcomes are achieved by using multiple types of treatments. Medications: The U.S. Food and Drug Administration has approved three drugs for the treatment of fibromyalgia. They include two drugs that change some of the brain chemicals (serotonin and norepinephrine) that help control pain levels: duloxetine (Cymbalta) and milnacipran (Savella). Older drugs that affect these same brain chemicals also may be used to treat fibromyalgia. These include amitriptyline (Elavil), cyclobenzaprine (Flexeril) or venlafaxine (Effexor). Side effects vary by the drug. Ask your doctor about the risks and benefits of your medicine. The other drug approved for fibromyalgia is pregabalin (Lyrica). Pregabalin and another drug, gabapentin (Neurontin), work by blocking the overactivity of nerve cells involved in pain transmission.  These medicines may cause dizziness, sleepiness, swelling and weight gain. Though not recommended as the first treatment, tramadol (Ultram) may be used to treat fibromyalgia pain. This painkiller is an opioid narcotic. Doctors do not suggest using other opioids for treating fibromyalgia. This is not because of fears of dependence. Rather, evidence suggests these drugs are not of great benefit to most people with fibromyalgia. In fact, they may cause greater pain sensitivity or make pain persist.   In some cases, fibromyalgia pain can improve with use of over-the-counter medicines such as acetaminophen (Tylenol) or nonsteroidal anti-inflammatory drugs (commonly called NSAIDs) like ibuprofen (Advil, Motrin) or naproxen (Aleve, Anaprox). Yet, these drugs likely treat the pain triggers, rather than the fibromyalgia pain itself. Thus, they are most useful in people who have other causes for pain such as arthritis. For sleep problems, some of the medicines that treat pain also improve sleep. These include cyclobenzaprine (Flexeril), amitriptyline (Elavil), gabapentin (Neurontin) or pregabalin (Lyrica). It is not recommended that patients with fibromyalgia take sleeping medicines like zolpidem (Ambien) or benzodiazepine medications. Other Therapies: People with fibromyalgia should use non-drug treatments as well as any medicines their doctors suggest. Research shows that gentle body-based therapies including Roscoe Chi and yoga can ease fibromyalgia symptoms. Cognitive behavioral therapy (a type of therapy focused on behavior change and positive thinking) can help redefine your illness beliefs. Also, through learning symptom reduction skills, you can change your behavioral response to pain. Other complementary and alternative therapies (sometimes called CAM or integrative medicine), such as acupuncture, chiropractic and massage therapy, can be useful to manage fibromyalgia symptoms.  Many of these treatments, though, have not been well tested in patients with fibromyalgia. Living with fibromyalgia  Even with the many treatment options, patient self-care is vital to improving symptoms and daily function. In concert with medical treatment, healthy lifestyle behaviors can reduce pain, increase sleep quality, lessen fatigue and help you cope better with fibromyalgia. Here are some self-care tips. Make time to relax each day. Deep-breathing exercises and meditation will help reduce the stress that can bring on symptoms. Set a regular sleep pattern. Go to bed and wake up at the same time each day. Getting enough sleep lets your body repair itself, physically and mentally. Also, avoid daytime napping and limit caffeine intake, which can disrupt sleep. Nicotine is a stimulant, so those fibromyalgia patients with sleep problems should stop smoking. Exercise often. This is a very important part of fibromyalgia treatment. While difficult at first, regular exercise often reduces pain symptoms and fatigue. Patients should follow the saying, \"Start low, go slow. \" Slowly add daily fitness into your routine. For instance, take the stairs instead of the elevator, or park further away from the store. After awhile, do more physical activity. Add in some walking, swimming, water aerobics and/or stretching exercises. It takes time to create a comfortable routine. Just get moving, stay active and don't give up! Educate yourself. Nationally recognized organizations like the [facility] are great resources for information. Share this information with family, friends and co-workers. Points to remember  Look forward, not backward. Focus on what you need to do to get better, not what caused your illness. As your symptoms decrease with drug treatments, start increasing your activity. Begin to do things that you stopped doing because of your pain and other symptoms.    With proper treatment and

## 2020-10-13 RX ORDER — LEVALBUTEROL TARTRATE 45 UG/1
AEROSOL, METERED ORAL
Qty: 15 G | Refills: 5 | Status: SHIPPED | OUTPATIENT
Start: 2020-10-13 | End: 2022-03-09

## 2020-11-16 RX ORDER — MELOXICAM 15 MG/1
TABLET ORAL
Qty: 90 TABLET | Refills: 0 | Status: SHIPPED | OUTPATIENT
Start: 2020-11-16 | End: 2021-07-26

## 2020-11-16 NOTE — TELEPHONE ENCOUNTER
Last office visit 8/18/2020     Last written 8/18/20     Surgery n/a     Next office visit scheduled Visit date not found    Requested Prescriptions     Pending Prescriptions Disp Refills    meloxicam (MOBIC) 15 MG tablet [Pharmacy Med Name: MELOXICAM 15 MG TABLET] 90 tablet 0     Sig: TAKE ONE TABLET BY MOUTH DAILY

## 2020-11-30 RX ORDER — CETIRIZINE HYDROCHLORIDE 10 MG/1
TABLET ORAL
Qty: 30 TABLET | Refills: 5 | Status: SHIPPED | OUTPATIENT
Start: 2020-11-30 | End: 2021-06-01

## 2020-12-10 ENCOUNTER — VIRTUAL VISIT (OUTPATIENT)
Dept: PULMONOLOGY | Age: 40
End: 2020-12-10
Payer: COMMERCIAL

## 2020-12-10 PROCEDURE — 99214 OFFICE O/P EST MOD 30 MIN: CPT | Performed by: INTERNAL MEDICINE

## 2020-12-10 RX ORDER — IMIPRAMINE HYDROCHLORIDE 10 MG/1
10 TABLET, FILM COATED ORAL NIGHTLY
COMMUNITY
End: 2022-03-09

## 2020-12-10 RX ORDER — FAMOTIDINE 20 MG/1
TABLET, FILM COATED ORAL
COMMUNITY
Start: 2020-06-19 | End: 2022-03-09

## 2020-12-10 NOTE — PROGRESS NOTES
[facility] Pulmonary, Critical Care, and Sleep    Outpatient Follow Up Note    CC: asthma and ill visit  Consulting provider: Uyen Rodrigues MD    Interval History: 38 y.o. female   Dyspnea on exertion is grossly stable. Compliant with medications. She feels like she has been more inactive. Initial HPI: h/o smoking, Asthma for about 20 years, obesity, GERD, anxiety presents for dyspnea and asthma. The patient states she has uncontrolled symptoms most of this year. She did move into a new house this year. The patient was seen by her NP yesterday and started on Levaquin, Singulair and steroids. The pt reports she has been on prednisone treatments most of the year. She has SOB and wheezing daily and at night. Dry cough every day and night intermittently. She uses her rescue inhalers several times per day with some help. She does have chronic nasal congestion with post-nasal gtt. Symptoms are worse with strong smells, heat, environmental allergies (she is not sure what they are). The patient does not think that her cats make things worse. Her house has mold and dust. No down bedding. Smoked 1PPD for 20 years. Quit 2015    Current Medications:    Current Outpatient Medications:     famotidine (PEPCID) 20 MG tablet, TAKE ONE TABLET BY MOUTH TWICE A DAY, Disp: , Rfl:     imipramine (TOFRANIL) 10 MG tablet, Take 10 mg by mouth nightly, Disp: , Rfl:     cetirizine (ZYRTEC) 10 MG tablet, TAKE ONE TABLET BY MOUTH DAILY, Disp: 30 tablet, Rfl: 5    mometasone-formoterol (DULERA) 100-5 MCG/ACT inhaler, INHALE TWO PUFFS BY MOUTH TWICE A DAY, Disp: 13 g, Rfl: 5    levalbuterol (XOPENEX HFA) 45 MCG/ACT inhaler, INHALE ONE TO TWO PUFFS BY MOUTH EVERY 6 HOURS AS NEEDED FOR WHEEZING OR SHORTNESS OF BREATH, Disp: 15 g, Rfl: 5    pregabalin (LYRICA) 50 MG capsule, Take 1 capsule by mouth 2 times daily for 30 days. , Disp: 60 capsule, Rfl: 2    benzonatate (TESSALON) 200 MG capsule, Take 1 capsule by mouth 3 times daily as needed for Cough, Disp: 90 capsule, Rfl: 1    ondansetron (ZOFRAN ODT) 4 MG disintegrating tablet, Take 1 tablet by mouth every 8 hours as needed for Nausea or Vomiting, Disp: 12 tablet, Rfl: 0    metFORMIN (GLUCOPHAGE) 500 MG tablet, Take 500 mg by mouth daily (with breakfast), Disp: , Rfl:     meloxicam (MOBIC) 15 MG tablet, TAKE ONE TABLET BY MOUTH DAILY (Patient not taking: Reported on 12/10/2020), Disp: 90 tablet, Rfl: 0    diclofenac (VOLTAREN) 75 MG EC tablet, Take 1 tablet by mouth 2 times daily (with meals) (Patient not taking: Reported on 12/10/2020), Disp: 40 tablet, Rfl: 0    esomeprazole Magnesium (NEXIUM) 20 MG PACK, Take 20 mg by mouth 2 times daily , Disp: , Rfl:     Objective:   PHYSICAL EXAM:    There were no vitals taken for this visit. Constitutional:  No acute distress. Appears well developed and nourished. Eyes: EOM intact. Conjunctivae anicteric. No visible discharge. HENT: Head is normocephalic and atraumatic. Mucus membranes are moist and the tongue appears normal. Normal appearing nose. External Ears normal.   Neck: No obvious mass and the trachea is midline. Respiratory: No accessory muscle usage. Respiratory effort normal. No visualized signs of difficulty breathing or respiratory distress  Psychiatric: No anxiety or Agitation. Alert and Oriented to person, place and time. Normal judgement and insight. LABS:  Reviewed any pertinent new labs that are available.   Neg aspergillus Ab    PFTs 8/31/16 FVC  (64%) FEV1 1.97 (62%) FEV1/FVC ratio    TLC 2.24 (64%)  RV  (%)   DLCO (70%) Bronchodilator response: no    Allergic to digs, weeds, trees, molds    IMAGING:  I personally reviewed and interpreted the following today in the office:   10/19/15 Chest CT:  Impression: Small multifocal groundglass opacities in the lungs   predominantly the upper lobes. Small multifocal pneumonias are   suspected.  Bronchial wall thickening is also present in the upper   lungs probably due to a superimposed acute bronchitis. No pleural   effusion. Cholelithiasis. 2/7/16 Chest CT: IMPRESSION:   1. Consolidation in both lower lobes, right greater than left, with small   right pleural effusion, consistent with pneumonia. A patchy 9 mm opacity   within the left upper lobe is new from prior exam, and favored to also be   infectious in etiology. 2. Gallstones. 2/15/16 CXR: LLL has cleared and RLL improved but still has basilar ASD. No pleural effusion seen. 4/15/16 CXR:  right elevated hemidiaphragm and right basilar atelectasis    8/31/16 SNIFF test: impaired right diaphragm    2/14/18 cxr: The lungs are without acute focal process.  There is no effusion or   pneumothorax. The cardiomediastinal silhouette is without acute process. The   osseous structures are without acute process. Impression   No acute process. ASSESSMENT:   Asthma - mod persistent. 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 Crocker  Avoid second smoke from her   Auto CPAP 10-14cm H20 - not interested in using  F/u in 6 months with PFTs    THIS VISIT WAS COMPLETED VIRTUALLY USING DOXY. ME  Pursuant to the emergency declaration under the [facility], [facility] authority and the [facility] Act, this Virtual  Visit was conducted, with patient's consent, to reduce the patient's risk of exposure to COVID-19 and provide continuity of care for an established patient. Services were provided through a video synchronous discussion virtually to substitute for in-person clinic visit.

## 2020-12-28 RX ORDER — PREGABALIN 50 MG/1
CAPSULE ORAL
Qty: 60 CAPSULE | Refills: 1 | Status: SHIPPED | OUTPATIENT
Start: 2020-12-28 | End: 2021-07-26

## 2021-06-01 RX ORDER — CETIRIZINE HYDROCHLORIDE 10 MG/1
TABLET ORAL
Qty: 30 TABLET | Refills: 4 | Status: SHIPPED | OUTPATIENT
Start: 2021-06-01 | End: 2021-11-01

## 2021-06-22 ENCOUNTER — TELEPHONE (OUTPATIENT)
Dept: PULMONOLOGY | Age: 41
End: 2021-06-22

## 2021-06-22 NOTE — TELEPHONE ENCOUNTER
Patient did not show for 6 month PFT appointment  with Dr. Aníbal Scott on 6/22/21. Same Day Cancellation: Yes. Pt called stating that she is taking care of her father and his IV meds are messed up and she has to take care of it today, won't be able to make appts. Patient rescheduled:  Yes    New appointment: 10/5/21 with same day PFT. Patient was also no show on: n/a    VV 12/10/20:      ASSESSMENT:   · Asthma - mod persistent. 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 Bindu Lovelace 11/16/15, LE edema on lasix, Hiatal hernia s/p fundoplication 8/9001     PLAN:   · Dulera  · PRN  Xoponex   · PRN tessalon pearls  · flonase and zyrtec  · sinus rinses PRN   · Weight loss recommended -refer to DR. Crocker  · Avoid second smoke from her   · Auto CPAP 10-14cm H20 - not interested in using  · F/u in 6 months with PFTs

## 2021-07-26 ENCOUNTER — OFFICE VISIT (OUTPATIENT)
Dept: RHEUMATOLOGY | Age: 41
End: 2021-07-26
Payer: COMMERCIAL

## 2021-07-26 VITALS
SYSTOLIC BLOOD PRESSURE: 138 MMHG | WEIGHT: 285 LBS | HEIGHT: 65 IN | BODY MASS INDEX: 47.48 KG/M2 | DIASTOLIC BLOOD PRESSURE: 86 MMHG

## 2021-07-26 DIAGNOSIS — M79.7 FIBROMYALGIA: Primary | ICD-10-CM

## 2021-07-26 DIAGNOSIS — M15.9 GENERALIZED OSTEOARTHRITIS OF HAND: ICD-10-CM

## 2021-07-26 PROCEDURE — 1036F TOBACCO NON-USER: CPT | Performed by: INTERNAL MEDICINE

## 2021-07-26 PROCEDURE — 99214 OFFICE O/P EST MOD 30 MIN: CPT | Performed by: INTERNAL MEDICINE

## 2021-07-26 PROCEDURE — G8417 CALC BMI ABV UP PARAM F/U: HCPCS | Performed by: INTERNAL MEDICINE

## 2021-07-26 PROCEDURE — G8427 DOCREV CUR MEDS BY ELIG CLIN: HCPCS | Performed by: INTERNAL MEDICINE

## 2021-07-26 RX ORDER — DICLOFENAC SODIUM 75 MG/1
75 TABLET, DELAYED RELEASE ORAL 2 TIMES DAILY
Qty: 60 TABLET | Refills: 3 | Status: SHIPPED | OUTPATIENT
Start: 2021-07-26 | End: 2022-03-09

## 2021-07-26 NOTE — PROGRESS NOTES
[address], MD                                                           [address] [place] [address] Sirena Mann 50, Allie 1019 (d) [phone] (F)      Dear Dr. Daphne Ozuna:  Please find Rheumatology assessment. Thank you for giving me the opportunity to be involved in [place] care and I look forward following Pauline along with you. If you have any questions or concerns please feel free to reach me. Note is transcribed using voice recognition software. Inadvertent computerized transcription errors may be present. Patient identification: Chinita Kawasaki: [id number],39 y.o. Sex: female     A/P  Laymon Payer was seen today for follow-up. Diagnoses and all orders for this visit:    Fibromyalgia    Generalized osteoarthritis od multiple joints    Other orders  -     diclofenac (VOLTAREN) 75 MG EC tablet; Take 1 tablet by mouth 2 times daily       DIP bony swelling-Heberden's nodules from osteoarthritis in scattered DIPs. General musculoskeletal discomfort is from fibromyalgia. ARJUN untreated-unfortunately, her home setting does not allow to use CPAP machine. Multiple family members are sharing two-bedroom apartments. No evidence of inflammatory arthritis. RF, CCP, STACY inflammatory markers are normal.       Plan-  Reassured patient that she does not have rheumatoid arthritis or systemic inflammatory arthritis. Current findings are from generalized osteoarthritis, rather early onset-finger joints, neck, hips, knees. She does have strong family history of generalized osteoarthritis. Recommend trying NSAID, although given the history of GERD she may or may not be able to tolerate.   Recommend using diclofenac only and as needed basis. For fibromyalgia, Lyrica was prescribed, would like to try diclofenac for now. In the past-she tried Cymbalta, Effexor, gabapentin without much help. Patient indicates understanding and agrees with the management plan. #######################################################################    [id number] for widespread musculoskeletal discomfort and knots in her fingers. States that she started the Dupilumab for eczema, next month noted nodules in her multiple fingers-most painful left 5th finger, right thumb. She is wondering if the medication caused it, is referred here for further evaluation. She continues to have generalized musculoskeletal discomfort from fibromyalgia. She is also experiencing whole body joint pain-multiple fingers, wrist, neck, back, knees, ankle and feet. She is followed by orthopedics for bilateral hip pain, was told to have advanced osteoarthritis. She also reports subjective swelling in her finger joints. All other ROS are negative.   No family history of autoimmune diseases    Current Outpatient Medications   Medication Sig Dispense Refill    diclofenac (VOLTAREN) 75 MG EC tablet Take 1 tablet by mouth 2 times daily 60 tablet 3    cetirizine (ZYRTEC) 10 MG tablet TAKE ONE TABLET BY MOUTH DAILY 30 tablet 4    mometasone-formoterol (DULERA) 100-5 MCG/ACT inhaler INHALE TWO PUFFS BY MOUTH TWICE A DAY 13 g 4    famotidine (PEPCID) 20 MG tablet TAKE ONE TABLET BY MOUTH TWICE A DAY      imipramine (TOFRANIL) 10 MG tablet Take 10 mg by mouth nightly      levalbuterol (XOPENEX HFA) 45 MCG/ACT inhaler INHALE ONE TO TWO PUFFS BY MOUTH EVERY 6 HOURS AS NEEDED FOR WHEEZING OR SHORTNESS OF BREATH 15 g 5    benzonatate (TESSALON) 200 MG capsule Take 1 capsule by mouth 3 times daily as needed for Cough 90 capsule 1    ondansetron (ZOFRAN ODT) 4 MG disintegrating tablet Take 1 tablet by mouth every 8 hours as needed for Nausea or Vomiting 12 tablet 0    metFORMIN (GLUCOPHAGE) 500 MG tablet Take 500 mg by mouth daily (with breakfast)      esomeprazole Magnesium (NEXIUM) 20 MG PACK Take 20 mg by mouth 2 times daily        No current facility-administered medications for this visit. Allergies   Allergen Reactions    Other Dermatitis     BANDAIDS, ADHESIVES     Penicillins Swelling    Morphine Other (See Comments)     nausea and vomiting  nausea and vomiting  nausea and vomiting      Morphine And Related Other (See Comments)     nausea and vomiting      Oxycodone-Acetaminophen Rash    Percocet [Oxycodone-Acetaminophen] Rash    Sulfa Antibiotics Rash       PHYSICAL EXAM:    Vitals:    /86   Ht 5' 5\" (1.651 m)   Wt 285 lb (129.3 kg)   BMI 47.43 kg/m²   General appearance/ Psychiatric: well nourished, and well groomed, normal judgement, alert, appears stated age and cooperative. MKS: Heberden's nodules in scattered DIP joints bilaterally. OA changes across PIP joints, hips, knees, feet. Hyperesthesia all over in the skin and muscles. No focal inflamed joints in upper or lower extremities, full range of motion all peripheral joints. Normal gait, muscle strength in upper and lower extremities. Skin: No rashes, no induration or skin thickening or nodules. No evidence ischemia or deformities noted in digits or nails.     DATA:   Lab Results   Component Value Date    WBC 11.1 (H) 01/10/2019    HGB 13.4 01/10/2019    HCT 41.7 01/10/2019    MCV 82.0 01/10/2019     01/10/2019         Chemistry        Component Value Date/Time     01/10/2019 2047    K 4.0 01/10/2019 2047    CL 99 01/10/2019 2047    CO2 29 01/10/2019 2047    BUN 12 01/10/2019 2047    CREATININE 0.7 01/10/2019 2047        Component Value Date/Time    CALCIUM 9.2 01/10/2019 2047    ALKPHOS 95 01/10/2019 2047    AST 12 (L) 01/10/2019 2047    ALT 13 01/10/2019 2047    BILITOT <0.2 01/10/2019 2047          No results found for: LABURIC  No results found for: SEDRATE  No results found for: CRP  No results found for: STACY, RHEUMFACTOR, SEDRATE  No results found for: CKTOTAL  Lab Results   Component Value Date    TSH 3.09 05/08/2013          Total time32 minutes that includes the following-  Preparing to see the patient such as reviewing patients records, pre-charting, preparing the visit on the same day, performing a medically appropriate history and physical examination, counseling and educating patient about diagnosis, management plan, ordering appropriate testings, prescriptions, communicating findings to other care providers, and documenting clinical information in electronic medical record. A/P- See above.

## 2021-07-27 ENCOUNTER — OFFICE VISIT (OUTPATIENT)
Dept: ORTHOPEDIC SURGERY | Age: 41
End: 2021-07-27
Payer: COMMERCIAL

## 2021-07-27 VITALS — WEIGHT: 285 LBS | BODY MASS INDEX: 47.48 KG/M2 | HEIGHT: 65 IN

## 2021-07-27 DIAGNOSIS — E66.01 OBESITY, CLASS III, BMI 40-49.9 (MORBID OBESITY) (HCC): ICD-10-CM

## 2021-07-27 DIAGNOSIS — M25.552 LEFT HIP PAIN: ICD-10-CM

## 2021-07-27 DIAGNOSIS — M70.62 GREATER TROCHANTERIC BURSITIS OF LEFT HIP: Primary | ICD-10-CM

## 2021-07-27 PROCEDURE — G8427 DOCREV CUR MEDS BY ELIG CLIN: HCPCS | Performed by: ORTHOPAEDIC SURGERY

## 2021-07-27 PROCEDURE — 1036F TOBACCO NON-USER: CPT | Performed by: ORTHOPAEDIC SURGERY

## 2021-07-27 PROCEDURE — 99214 OFFICE O/P EST MOD 30 MIN: CPT | Performed by: ORTHOPAEDIC SURGERY

## 2021-07-27 PROCEDURE — G8417 CALC BMI ABV UP PARAM F/U: HCPCS | Performed by: ORTHOPAEDIC SURGERY

## 2021-07-27 PROCEDURE — 20611 DRAIN/INJ JOINT/BURSA W/US: CPT | Performed by: ORTHOPAEDIC SURGERY

## 2021-07-27 RX ORDER — BUPIVACAINE HYDROCHLORIDE 2.5 MG/ML
10 INJECTION, SOLUTION INFILTRATION; PERINEURAL ONCE
Status: COMPLETED | OUTPATIENT
Start: 2021-07-27 | End: 2021-07-27

## 2021-07-27 RX ORDER — BETAMETHASONE SODIUM PHOSPHATE AND BETAMETHASONE ACETATE 3; 3 MG/ML; MG/ML
12 INJECTION, SUSPENSION INTRA-ARTICULAR; INTRALESIONAL; INTRAMUSCULAR; SOFT TISSUE ONCE
Status: COMPLETED | OUTPATIENT
Start: 2021-07-27 | End: 2021-07-27

## 2021-07-27 RX ORDER — LIDOCAINE HYDROCHLORIDE 10 MG/ML
40 INJECTION, SOLUTION INFILTRATION; PERINEURAL ONCE
Status: COMPLETED | OUTPATIENT
Start: 2021-07-27 | End: 2021-07-27

## 2021-07-27 RX ADMIN — BUPIVACAINE HYDROCHLORIDE 10 MG: 2.5 INJECTION, SOLUTION INFILTRATION; PERINEURAL at 11:33

## 2021-07-27 RX ADMIN — LIDOCAINE HYDROCHLORIDE 40 MG: 10 INJECTION, SOLUTION INFILTRATION; PERINEURAL at 11:33

## 2021-07-27 RX ADMIN — BETAMETHASONE SODIUM PHOSPHATE AND BETAMETHASONE ACETATE 12 MG: 3; 3 INJECTION, SUSPENSION INTRA-ARTICULAR; INTRALESIONAL; INTRAMUSCULAR; SOFT TISSUE at 11:33

## 2021-07-27 NOTE — PROGRESS NOTES
Dr Cally Frazier      Date /Time 2021       12:52 PM EDT  Name Daryle Connors             1980   Location  [facility]  MRN [id number]                Chief Complaint   Patient presents with    Hip Pain     LEFT HIP PAIN         History of Present Illness    Daryle Connors is a 42 y.o. female who presents with  left hip pain. Sent in consultation by Dai Lin. Occupational activities: clerical work. Athletic/exercise activity: no sports. Injury Mechanism:  none. Worker's Comp. & legal issues:   none. Previous Treatments: Ice, Heat, NSAIDs, pain medication, Physical Therapy Stretching exercises with therapy before and Cortisone Injections Has had cortisone injection somewhere around the hip    She reports indolent onset of left hip pain lateral sided. She is morbidly obese. She has pain with palpation cannot sleep directly on the side. She is a history fibromyalgia. She also has difficulty ambulating and has tenderness directly over the lateral side of the hip. She does not report any groin pain reports her sided pain. Pain tends to radiate down the lateral aspect of the hip towards her thigh and IT band.     Past History  Past Medical History:   Diagnosis Date    Anesthesia complication     severe hypotension with block    Anxiety and depression     Arthritis     Asthma     Chronic bronchitis (HCC)     Fibromyalgia     Hypertension     Migraine     Pneumonia     Rash     Reflux     Sleep apnea     Wears glasses      Past Surgical History:   Procedure Laterality Date    ABDOMEN SURGERY      BRONCHOSCOPY  16     SECTION      x 2    COLONOSCOPY  2013    normal per pt    CYST REMOVAL      DENTAL SURGERY      teeth removed    ENDOSCOPY, COLON, DIAGNOSTIC      ESOPHAGEAL DILATATION      GALLBLADDER SURGERY      GASTRIC FUNDOPLICATION      HYSTEROSCOPY  09/10/2015    Hysteroscopy, dilation and curettage with the MyoSure, NovaSure    OTHER SURGICAL HISTORY  9/10/15    HYSTEROSCOPY, DILATATION AND CURETTAGE WITH MYOSURE, NOVASURE    SHOULDER SURGERY      right    THROAT SURGERY Right 2015    vocal cord stripping    TUBAL LIGATION      UPPER GASTROINTESTINAL ENDOSCOPY  13    gastritis, hiatal hernia, hemorrhoids    UPPER GASTROINTESTINAL ENDOSCOPY      10/2015    UPPER GASTROINTESTINAL ENDOSCOPY  2016    URETHRA SURGERY       Family History   Problem Relation Age of Onset    Mental Illness Mother         suicide    Diabetes Father     High Blood Pressure Father     Kidney Disease Father     High Cholesterol Father     Asthma Neg Hx     Cancer Neg Hx     Emphysema Neg Hx     Heart Failure Neg Hx     Hypertension Neg Hx      Social History     Tobacco Use    Smoking status: Former Smoker     Packs/day: 2.00     Years: 22.00     Pack years: 44.00     Types: Cigarettes     Quit date: 10/17/2015     Years since quittin.7    Smokeless tobacco: Never Used   Substance Use Topics    Alcohol use:  Yes     Alcohol/week: 0.0 standard drinks     Comment: rarely      Current Outpatient Medications on File Prior to Visit   Medication Sig Dispense Refill    diclofenac (VOLTAREN) 75 MG EC tablet Take 1 tablet by mouth 2 times daily 60 tablet 3    cetirizine (ZYRTEC) 10 MG tablet TAKE ONE TABLET BY MOUTH DAILY 30 tablet 4    mometasone-formoterol (DULERA) 100-5 MCG/ACT inhaler INHALE TWO PUFFS BY MOUTH TWICE A DAY 13 g 4    famotidine (PEPCID) 20 MG tablet TAKE ONE TABLET BY MOUTH TWICE A DAY      imipramine (TOFRANIL) 10 MG tablet Take 10 mg by mouth nightly      levalbuterol (XOPENEX HFA) 45 MCG/ACT inhaler INHALE ONE TO TWO PUFFS BY MOUTH EVERY 6 HOURS AS NEEDED FOR WHEEZING OR SHORTNESS OF BREATH 15 g 5    benzonatate (TESSALON) 200 MG capsule Take 1 capsule by mouth 3 times daily as needed for Cough 90 capsule 1    ondansetron (ZOFRAN ODT) 4 MG disintegrating tablet Take 1 tablet by mouth every 8 hours as needed for Nausea or Vomiting 12 tablet 0    metFORMIN (GLUCOPHAGE) 500 MG tablet Take 500 mg by mouth daily (with breakfast)      esomeprazole Magnesium (NEXIUM) 20 MG PACK Take 20 mg by mouth 2 times daily        No current facility-administered medications on file prior to visit. ASCVD 10-YEAR RISK SCORE  The ASCVD Risk score (Esme Tamayo., et al., 2013) failed to calculate for the following reasons:    Cannot find a previous HDL lab    Cannot find a previous total cholesterol lab   . Review of Systems  10-point ROS is negative other than HPI. Physical Exam  Based off 1997 Exam Criteria  Ht 5' 5\" (1.651 m)   Wt 285 lb (129.3 kg)   BMI 47.43 kg/m²      Constitutional:       General: He is not in acute distress. Morbidly obese. Appearance: Normal appearance. Cardiovascular:      Rate and Rhythm: Normal rate and regular rhythm. Pulses: Normal pulses. Pulmonary:      Effort: Pulmonary effort is normal. No respiratory distress. Neurological:      Mental Status: He is alert and oriented to person, place, and time. Mental status is at baseline.      Musculoskeletal:  Gait:  antalgic    Spine / Hip Exam:      RIGHT  LEFT    Lumbar Spine Exam  [x] All Neg    [x] All Neg     Straight leg raise  []  []Not tested   []  []Not tested    Clonus  []  []Not tested   []  []Not tested    Pain with motion  []  []Not tested   []  []Not tested    Radiculopathy  []  []Not tested   []  []Not tested    Paraspinal muscle tenderness  [] Paraspinal  []Midline   [] Paraspinal  []Midline   Sensation RIGHT  LEFT    L3  [x] Normal []Decreased    [x] Normal []Decreased   L4  [x] Normal  []Decreased   [x] Normal []Decreased   L5  [x] Normal []Decreased   [x] Normal []Decreased   S1  [x] Normal  []Decreased   [x] Normal []Decreased   Pelvis       Scoliosis  [x] Nml  [] Present     Leg-length discrepency  [x] Equal  [] Right longer   [] Left longer   Range of Motion Active Passive Active Passive   Hip Flexion 120  120    Abduction 50  50 External Rotation @ 90 flex 65  65    Internal Rotation @ 90 flex 20  20           Hip Impingement / Dysplasia  [] All Neg  [] Not tested   [] All Neg  [] Not tested    Hip impingement test  []  []Not tested   []  []Not tested    C-sign  []  []Not tested   []  []Not tested    Anterior instability apprehension  []  []Not tested   []  []Not tested    Posterior instability apprehension  []  []Not tested   []  []Not tested    Uncontained Internal rotation  []  []Not tested  []  []Not tested          Abductors  [] All Neg  [] Not tested   [] All Neg  [] Not tested    Medius strength  []  []Not tested   []  []Not tested    Minimum strength  []  []Not tested   []  []Not tested    IT band tendonitis  []  []Not tested   []  []Not tested    Trochanteric tenderness  []  []Not tested  [x]  []Not tested   Sciatic neuropathic pain  []  []Not tested   []  []Not tested           Post-arthroplasty  [] All Neg  [] Not tested   [] All Neg  [] Not tested    Rectus tendonitis  []  []Not tested   []  []Not tested    Iliopsoas tendonitis       Start-up pain  []  []Not tested   []  []Not tested      No Trendelenburg gait, point tenderness directly over the trochanter gets quite severe. She does have some baseline fatty deposits present in bilateral hips. No evidence of side to side difference. Imaging    Left Hip: [facility]   Radiographs: No evidence of arthritis, impingement or dysplasia. No cam lesion present. Well-balanced hips. No evidence of fracture. Assessment and Plan  Shanell Lee was seen today for hip pain. Diagnoses and all orders for this visit:    Left hip pain  -     XR HIP LEFT (2-3 VIEWS); Future    Greater trochanteric bursitis of left hip    Obesity, Class III, BMI 40-49.9 (morbid obesity) ([facility] [address]    Other orders  -     US ARTHR/ASP/INJ MAJOR JNT/BURSA LEFT;  Future  -     bupivacaine (MARCAINE) 0.25 % injection 10 mg  -     lidocaine 1 % injection 40 mg  -     betamethasone acetate-betamethasone sodium phosphate (CELESTONE) injection 12 mg        I discussed with Karyle Pitcher that her history, symptoms, signs and imaging are most consistent with Trochanteric bursitis, morbid obesity    I addressed the obesity with the patient. I described some techniques for weight loss. She will continue to work on these. We reviewed the natural history of these conditions and treatment options ranging from conservative measures (rest, icing, activity modification, physical therapy, pain meds, cortisone injection) to surgical options. In terms of treatment, I recommended starting with rest, icing, avoidance of painful activities, NSAIDs or pain meds as tolerated, and physical therapy. I recommend stretching exercises and therapy for truck bursitis. We discussed icing techniques. I discussed in detail the risks, benefits, and complications of an injection which include but are not limited to infection, skin reactions, hot swollen joints, and anaphylaxis with the patient. The patient verbalized good understanding and gave informed consent for the left hip injection. The skin was prepped using sterile alcohol. A sterile 22-gauge needle was inserted into the area of maximal tenderness over the greater trochanter and a mixture of 2 cc of 40 mg/cc of betamethasone, 4 cc of 1% plain lidocaine, and 4 cc of 0.25% Sensorcaine was injected under sterile technique. The needle was withdrawn and the puncture site sealed with a Band-Aid. Technique: Under sterile conditions a [employer] ultrasound unit with a variable frequency (6.0-15.0 MHz) linear transducer was used to localize the placement of a 22-gauge needle into the area of maximal tenderness over the greater trochanter. Findings: Successful needle placement for Hip injection. Final images were taken and saved for permanent record. The patient tolerated the injection well.  The patient was instructed to call the office immediately if there is any pain, redness, warmth, fever, or chills. Electronically signed by Honorio Estrada MD on 7/27/2021 at 12:52 PM  This dictation was generated by voice recognition computer software. Although all attempts are made to edit the dictation for accuracy, there may be errors in the transcription that are not intended.

## 2021-10-01 ENCOUNTER — TELEPHONE (OUTPATIENT)
Dept: PULMONOLOGY | Age: 41
End: 2021-10-01

## 2021-10-01 NOTE — TELEPHONE ENCOUNTER
Patient cancelled appointment on 10/5/21 with Dr. Marii Alfaro for 6 month pft.    Reason: pt has covid    Patient did not reschedule appointment. Appointment rescheduled for na. Patient is asking if Dr. Marii Alfaro would order IV treatment for covid    Last OV 12/10/20      ASSESSMENT:   · Asthma - mod persistent. 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 Crocker  · Avoid second smoke from her   · Auto CPAP 10-14cm H20 - not interested in using  · F/u in 6 months with PFTs

## 2021-10-01 NOTE — TELEPHONE ENCOUNTER
Will f/u with patient next week to lasha appt with Dr Crespo. Will postpone PFT until after f/u wth Dr Kathy Campa.

## 2021-10-04 ENCOUNTER — TELEPHONE (OUTPATIENT)
Dept: PULMONOLOGY | Age: 41
End: 2021-10-04

## 2021-10-04 NOTE — TELEPHONE ENCOUNTER
Do you have the following symptoms? Shortness of Breath  yes  Wheezing  yes  Cough  yes  Cough Characteristics:  Productive    no  Sputum Color    n/a  Hemoptysis   n/a  Consistency of sputum   n/a     Fever:    n/a    Temp: haven't check temp  Chills/Sweats:  yes  What other symptoms are you having?:  Diagnosed with COVID last week, head congestion, chest congestion, can't get anything up, and sharp pains in chest    How long have you had these symptoms? 9/27/21     [facility]    Have you been vaccinated for covid? Yes          Review medications and allergies: Allergies? Allergic to digs, weeds, trees, molds          Currently on Antibiotics? (Drug/Dose/Frequency and how long on?) n/a        Systemic Steroids? (Drug/Dose/Frequency and how long on?) n/a     Last OV 12/10/20 with Dr. Wendi Culp:   · Asthma - mod persistent. 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 Gonzalez Flatness 11/16/15, LE edema on lasix, Hiatal hernia s/p fundoplication [phone]/[id number]     PLAN:   · Dulera  · PRN  Xoponex   · PRN tessalon pearls  · flonase and zyrtec  · sinus rinses PRN   · Weight loss recommended -refer to DR. Crocker  · Avoid second smoke from her   · Auto CPAP 10-14cm H20 - not interested in using  · F/u in 6 months with PFTs

## 2021-10-08 ENCOUNTER — VIRTUAL VISIT (OUTPATIENT)
Dept: PULMONOLOGY | Age: 41
End: 2021-10-08
Payer: COMMERCIAL

## 2021-10-08 ENCOUNTER — TELEPHONE (OUTPATIENT)
Dept: PULMONOLOGY | Age: 41
End: 2021-10-08

## 2021-10-08 DIAGNOSIS — U07.1 COVID-19: ICD-10-CM

## 2021-10-08 DIAGNOSIS — J45.40 MODERATE PERSISTENT ASTHMA WITHOUT COMPLICATION: Primary | ICD-10-CM

## 2021-10-08 DIAGNOSIS — J45.40 MODERATE PERSISTENT ASTHMA WITHOUT COMPLICATION: ICD-10-CM

## 2021-10-08 DIAGNOSIS — U07.1 COVID-19: Primary | ICD-10-CM

## 2021-10-08 DIAGNOSIS — R05.9 COUGH: ICD-10-CM

## 2021-10-08 PROCEDURE — 99214 OFFICE O/P EST MOD 30 MIN: CPT | Performed by: INTERNAL MEDICINE

## 2021-10-08 PROCEDURE — 1036F TOBACCO NON-USER: CPT | Performed by: INTERNAL MEDICINE

## 2021-10-08 PROCEDURE — G8417 CALC BMI ABV UP PARAM F/U: HCPCS | Performed by: INTERNAL MEDICINE

## 2021-10-08 PROCEDURE — G8427 DOCREV CUR MEDS BY ELIG CLIN: HCPCS | Performed by: INTERNAL MEDICINE

## 2021-10-08 PROCEDURE — G8484 FLU IMMUNIZE NO ADMIN: HCPCS | Performed by: INTERNAL MEDICINE

## 2021-10-08 RX ORDER — DEXAMETHASONE 2 MG/1
8 TABLET ORAL DAILY
Qty: 40 TABLET | Refills: 0 | Status: SHIPPED | OUTPATIENT
Start: 2021-10-08 | End: 2021-10-18

## 2021-10-08 RX ORDER — DUPILUMAB 300 MG/2ML
INJECTION, SOLUTION SUBCUTANEOUS
COMMUNITY
Start: 2021-04-29

## 2021-10-08 RX ORDER — GUAIFENESIN AND CODEINE PHOSPHATE 100; 10 MG/5ML; MG/5ML
10 SOLUTION ORAL EVERY 4 HOURS PRN
Qty: 280 ML | Refills: 0 | Status: SHIPPED | OUTPATIENT
Start: 2021-10-08 | End: 2021-10-15

## 2021-10-08 RX ORDER — IPRATROPIUM BROMIDE AND ALBUTEROL SULFATE 2.5; .5 MG/3ML; MG/3ML
1 SOLUTION RESPIRATORY (INHALATION) EVERY 4 HOURS PRN
Qty: 360 ML | Refills: 5 | Status: SHIPPED | OUTPATIENT
Start: 2021-10-08 | End: 2022-03-09

## 2021-10-08 NOTE — PROGRESS NOTES
[facility] Pulmonary, Critical Care, and Sleep    Sick visit    CC: asthma and ill visit  Consulting provider: No ref. provider found    Interval History: worked in today for an urgent sick visit call. Dx with covid 9/29/21. IV Regeneron on 10/2/21. Now with persistent shortness of breath, wheezing, cough despite medrol dose pack and z-pack. Has been vaccinated. Cough is severe and results in chest pain. Her systemic symptoms are better after Regen-Cov. Oxygen saturations have been consistently in the 90's. Today was given another steroid taper from urgent care. There were changes on the CXR from urgent care by report.   (doctor's urgent care in [place])    Current Medications:    Current Outpatient Medications:     dupilumab (DUPIXENT) 300 MG/2ML SOSY injection, 300 mg every 14 days, Disp: , Rfl:     mometasone-formoterol (DULERA) 100-5 MCG/ACT inhaler, INHALE TWO PUFFS BY MOUTH TWICE A DAY, Disp: 13 g, Rfl: 5    diclofenac (VOLTAREN) 75 MG EC tablet, Take 1 tablet by mouth 2 times daily, Disp: 60 tablet, Rfl: 3    cetirizine (ZYRTEC) 10 MG tablet, TAKE ONE TABLET BY MOUTH DAILY, Disp: 30 tablet, Rfl: 4    famotidine (PEPCID) 20 MG tablet, TAKE ONE TABLET BY MOUTH TWICE A DAY, Disp: , Rfl:     imipramine (TOFRANIL) 10 MG tablet, Take 10 mg by mouth nightly, Disp: , Rfl:     levalbuterol (XOPENEX HFA) 45 MCG/ACT inhaler, INHALE ONE TO TWO PUFFS BY MOUTH EVERY 6 HOURS AS NEEDED FOR WHEEZING OR SHORTNESS OF BREATH, Disp: 15 g, Rfl: 5    benzonatate (TESSALON) 200 MG capsule, Take 1 capsule by mouth 3 times daily as needed for Cough, Disp: 90 capsule, Rfl: 1    ondansetron (ZOFRAN ODT) 4 MG disintegrating tablet, Take 1 tablet by mouth every 8 hours as needed for Nausea or Vomiting, Disp: 12 tablet, Rfl: 0    metFORMIN (GLUCOPHAGE) 500 MG tablet, Take 500 mg by mouth daily (with breakfast), Disp: , Rfl:     esomeprazole Magnesium (NEXIUM) 20 MG PACK, Take 20 mg by mouth 2 times daily , Disp: , Rfl: Objective:   PHYSICAL EXAM:    There were no vitals taken for this visit. VIRTUAL  Constitutional:  NAD, NCAT  Eyes: EOM intact. Anicteric. HENT: Nose, ears, neck normal, trachea midline   Respiratory: No ASM, no obvious wheezing  Cardiovascular: No obvious peripheral edema. Skin: No visible rash or nodule on visible face, upper thorax    Psychiatric: No anxiety or agitation. Neuro: A&O to P/P/E, judgement and insight intact       PFTs 8/31/16 FVC  (64%) FEV1 1.97 (62%) FEV1/FVC ratio    TLC 2.24 (64%)  RV  (%)   DLCO (70%) Bronchodilator response: no        IMAGING: no new    ASSESSMENT:   Asthma - mod persistent. 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 Jose Anthony as previously scheduled. Marisela Nguyen is a 42 y.o. female being evaluated by a Virtual Visit (audiovideo visit) encounter to address concerns as mentioned above. A caregiver was present when appropriate. Due to this being a TeleHealth encounter (During COVID-19 pandemic, evaluation of the following organ systems was limited: Vitals/Constitutional/EENT/Resp/CV/GI//MS/Neuro/Skin/Heme-Lymph-Imm. Pursuant to the emergency declaration under the [address] [place] [address], [address] [place] [address] authority and the [employer] Act, this Virtual Visit was conducted with patient's (and/or legal guardian's) consent, to reduce the patient's risk of exposure to COVID-19 and provide necessary medical care.   The patient (and/or legal guardian) is advised to contact this office for worsening conditions or problems, and to seek emergency medical treatment and/or call 911 for urgent concerns. Services were provided through a audiovideo synchronous discussion virtually to substitute for in-person clinic visit. Patient was located outside the office/hospital, in home or usual environment; provider was located in his office.     --Daniela Emanuel MD on 10/8/2021 at 3:16 PM

## 2021-10-08 NOTE — TELEPHONE ENCOUNTER
Dx with covid 9/29/21. Did have IV Regeneron on 10/2/21. Do you have the following symptoms? Shortness of Breath  yes  Wheezing  yes  Cough  yes  Cough Characteristics:  Productive    no  Sputum Color    No feels like she needs to cough out but cannot get anything out  Hemoptysis   no  Consistency of sputum   no     Fever:    no    Temp:no  Chills/Sweats:  yes  What other symptoms are you having?:  fatigue    How long have you had these symptoms? 9/27/21 (tested 9/29/21 positive for covid treated x2 medrol dose pack and zpak     Pharmacy: [facility]    Have you been vaccinated for covid? Yes          Review medications and allergies: Allergies? Allergies   Allergen Reactions    Other Dermatitis     BANDAIDS, ADHESIVES     Penicillins Swelling    Morphine Other (See Comments)     nausea and vomiting  nausea and vomiting  nausea and vomiting      Morphine And Related Other (See Comments)     nausea and vomiting      Oxycodone-Acetaminophen Rash    Percocet [Oxycodone-Acetaminophen] Rash    Sulfa Antibiotics Rash             Currently on Antibiotics? (Drug/Dose/Frequency and how long on?) no        Systemic Steroids? (Drug/Dose/Frequency and how long on?) pred taper         Last OV 12/10/21 with Dr. Iraj Palmer   (pull in last visit note assessment/plan)   12/10/20    ASSESSMENT:   · Asthma - mod persistent.  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 Natalia Becerra 11/16/15, LE edema on lasix, Hiatal hernia s/p fundoplication [phone]     PLAN:

## 2021-10-08 NOTE — TELEPHONE ENCOUNTER
Within this Telehealth Consent, the terms you and yours refer to the person using the Telehealth Service (Service), or in the case of a use of the Service by or on behalf of a minor, you and yours refer to and include (i) the parent or legal guardian who provides consent to the use of the Service by such minor or uses the Service on behalf of such minor, and (ii) the minor for whom consent is being provided or on whose behalf the Service is being utilized. When using Service, you will be consulting with your health care providers via the use of Telehealth.   Telehealth involves the delivery of healthcare services using electronic communications, information technology or other means between a healthcare provider and a patient who are not in the same physical location. Telehealth may be used for diagnosis, treatment, follow-up and/or patient education, and may include, but is not limited to, one or more of the following:    Electronic transmission of medical records, photo images, personal health information or other data between a patient and a healthcare provider    Interactions between a patient and healthcare provider via audio, video and/or data communications    Use of output data from medical devices, sound and video files    Anticipated Benefits   The use of Telehealth by your Provider(s) through the Service may have the following possible benefits:    Making it easier and more efficient for you to access medical care and treatment for the conditions treated by such Provider(s) utilizing the Service    Allowing you to obtain medical care and treatment by Provider(s) at times that are convenient for you    Enabling you to interact with Provider(s) without the necessity of an in-office appointment     Possible Risks   While the use of Telehealth can provide potential benefits for you, there are also potential risks associated with the use of Telehealth.  These risks include, but may not be limited to the following:    Your Provider(s) may not able to provide medical treatment for your particular condition and you may be required to seek alternative healthcare or emergency care services.  The electronic systems or other security protocols or safeguards used in the Service could fail, causing a breach of privacy of your medical or other information.  Given regulatory requirements in certain jurisdictions, your Provider(s) diagnosis and/or treatment options, especially pertaining to certain prescriptions, may be limited. Acceptance   1. You understand that Services will be provided via Telehealth. This process involves the use of HIPAA compliant and secure, real-time audio-visual interfacing with a qualified and appropriately trained provider located at [facility]. 2. You understand that, under no circumstances, will this session be recorded. 3. You understand that the Provider(s) at [facility] and other clinical participants will be party to the information obtained during the Telehealth session in accordance with best medical practices. 4. You understand that the information obtained during the Telehealth session will be used to help determine the most appropriate treatment options. 5. You understand that You have the right to revoke this consent at any point in time. 6. You understand that Telehealth is voluntary, and that continued treatment is not dependent upon consent. 7. You understand that, in the event of non-consent to Telehealth services and/or technical difficulties, you will obtain services as typically provided in the absence of Telehealth technology. 8. You understand that this consent will be kept in Your medical record. 9. No potential benefits from the use of Telehealth or specific results can be guaranteed. Your condition may not be cured or improved and, in some cases, may get worse.    10. There are limitations in the provision of medical care and treatment via Telehealth and the Service and you may not be able to receive diagnosis and/or treatment through the Service for every condition for which you seek diagnosis and/or treatment. 11. There are potential risks to the use of Telehealth, including but not limited to the risks described in this Telehealth Consent. 12. Your Provider(s) have discussed the use of Telehealth and the Service with you, including the benefits and risks of such and you have provided oral consent to your Provider(s) for the use of Telehealth and the Service. 15. You understand that it is your duty to provide your Provider(s) truthful, accurate and complete information, including all relevant information regarding care that you may have received or may be receiving from other healthcare providers outside of the Service. 14. You understand that each of your Provider(s) may determine in his or sole discretion that your condition is not suitable for diagnosis and/or treatment using the Service, and that you may need to seek medical care and treatment a specialist or other healthcare provider, outside of the Service. 15. You understand that you are fully responsible for payment for all services provided by Provider(s) or through use of the Service and that you may not be able to use third-party insurance. 16. You represent that (a) you have read this Telehealth Consent carefully, (b) you understand the risks and benefits of the Service and the use of Telehealth in the medical care and treatment provided to you by Provider(s) using the Service, and (c) you have the legal capacity and authority to provide this consent for yourself and/or the minor for which you are consenting under applicable federal and state laws, including laws relating to the age of [de-identified] and/or parental/guardian consent.    17. You give your informed consent to the use of Telehealth by Provider(s) using the Service under the terms described in the Terms of Service and this Telehealth Consent. The patient was read the following statement and has consented to the visit as of 10/8/21. The patient has been scheduled for their first telehealth visit on 10/8/21 with Daphne Beverly.

## 2021-10-19 ENCOUNTER — HOSPITAL ENCOUNTER (OUTPATIENT)
Age: 41
Discharge: HOME | End: 2021-10-19
Payer: COMMERCIAL

## 2021-10-19 LAB
C-REACTIVE PROTEIN: 19.6 MG/L (ref 0–5.1)
HCT VFR BLD CALC: 41.4 % (ref 36–48)
HEMOGLOBIN: 13 G/DL (ref 12–16)
MCH RBC QN AUTO: 25.3 PG (ref 26–34)
MCHC RBC AUTO-ENTMCNC: 31.5 G/DL (ref 31–36)
MCV RBC AUTO: 80.4 FL (ref 80–100)
PDW BLD-RTO: 15.7 % (ref 12.4–15.4)
PLATELET # BLD: 254 K/UL (ref 135–450)
PMV BLD AUTO: 8.8 FL (ref 5–10.5)
RBC # BLD: 5.15 M/UL (ref 4–5.2)
RHEUMATOID FACTOR: <10 IU/ML
SEDIMENTATION RATE, ERYTHROCYTE: 10 MM/HR (ref 0–20)
WBC # BLD: 11.1 K/UL (ref 4–11)

## 2021-10-19 PROCEDURE — 86431 RHEUMATOID FACTOR QUANT: CPT

## 2021-10-19 PROCEDURE — 86140 C-REACTIVE PROTEIN: CPT

## 2021-10-19 PROCEDURE — 86038 ANTINUCLEAR ANTIBODIES: CPT

## 2021-10-19 PROCEDURE — 85027 COMPLETE CBC AUTOMATED: CPT

## 2021-10-19 PROCEDURE — 85652 RBC SED RATE AUTOMATED: CPT

## 2021-10-19 PROCEDURE — 36415 COLL VENOUS BLD VENIPUNCTURE: CPT

## 2021-10-20 LAB — ANTI-NUCLEAR ANTIBODY (ANA): NEGATIVE

## 2021-11-01 RX ORDER — CETIRIZINE HYDROCHLORIDE 10 MG/1
TABLET ORAL
Qty: 30 TABLET | Refills: 5 | Status: SHIPPED | OUTPATIENT
Start: 2021-11-01 | End: 2022-03-09

## 2021-12-01 ENCOUNTER — OFFICE VISIT (OUTPATIENT)
Dept: PULMONOLOGY | Age: 41
End: 2021-12-01
Payer: COMMERCIAL

## 2021-12-01 VITALS
DIASTOLIC BLOOD PRESSURE: 76 MMHG | RESPIRATION RATE: 16 BRPM | WEIGHT: 288.2 LBS | SYSTOLIC BLOOD PRESSURE: 132 MMHG | OXYGEN SATURATION: 96 % | BODY MASS INDEX: 48.02 KG/M2 | HEIGHT: 65 IN | HEART RATE: 97 BPM

## 2021-12-01 DIAGNOSIS — J45.40 MODERATE PERSISTENT ASTHMA WITHOUT COMPLICATION: Primary | ICD-10-CM

## 2021-12-01 DIAGNOSIS — J98.6 DIAPHRAGM PARALYSIS: ICD-10-CM

## 2021-12-01 DIAGNOSIS — J30.2 SEASONAL ALLERGIC RHINITIS, UNSPECIFIED TRIGGER: ICD-10-CM

## 2021-12-01 PROCEDURE — 1036F TOBACCO NON-USER: CPT | Performed by: INTERNAL MEDICINE

## 2021-12-01 PROCEDURE — G8482 FLU IMMUNIZE ORDER/ADMIN: HCPCS | Performed by: INTERNAL MEDICINE

## 2021-12-01 PROCEDURE — G8417 CALC BMI ABV UP PARAM F/U: HCPCS | Performed by: INTERNAL MEDICINE

## 2021-12-01 PROCEDURE — 99214 OFFICE O/P EST MOD 30 MIN: CPT | Performed by: INTERNAL MEDICINE

## 2021-12-01 PROCEDURE — G8428 CUR MEDS NOT DOCUMENT: HCPCS | Performed by: INTERNAL MEDICINE

## 2021-12-01 RX ORDER — DULOXETIN HYDROCHLORIDE 30 MG/1
CAPSULE, DELAYED RELEASE ORAL
COMMUNITY
Start: 2021-10-19 | End: 2022-03-09

## 2021-12-01 RX ORDER — GABAPENTIN 100 MG/1
CAPSULE ORAL
COMMUNITY
Start: 2021-11-18 | End: 2021-12-18

## 2021-12-01 RX ORDER — METHOCARBAMOL 750 MG/1
TABLET, FILM COATED ORAL
COMMUNITY
Start: 2021-10-17

## 2021-12-01 NOTE — PROGRESS NOTES
[facility] Pulmonary, Critical Care, and Sleep    Outpatient Follow Up Note    CC: asthma   Consulting provider: No ref. provider found    Interval History: 42 y.o. female The pt had COVID 19 with significant cough and SOB. She received regeneron infusion and Decadron. Recovered well. Dyspnea on exertion is grossly stable. Compliant with medications. Initial HPI: h/o smoking, Asthma for about 20 years, obesity, GERD, anxiety presents for dyspnea and asthma. The patient states she has uncontrolled symptoms most of this year. She did move into a new house this year. The patient was seen by her NP yesterday and started on Levaquin, Singulair and steroids. The pt reports she has been on prednisone treatments most of the year. She has SOB and wheezing daily and at night. Dry cough every day and night intermittently. She uses her rescue inhalers several times per day with some help. She does have chronic nasal congestion with post-nasal gtt. Symptoms are worse with strong smells, heat, environmental allergies (she is not sure what they are). The patient does not think that her cats make things worse. Her house has mold and dust. No down bedding. Smoked 1PPD for 20 years.   Quit 2015    Current Medications:    Current Outpatient Medications:     methocarbamol (ROBAXIN) 750 MG tablet, , Disp: , Rfl:     DULoxetine (CYMBALTA) 30 MG extended release capsule, , Disp: , Rfl:     gabapentin (NEURONTIN) 100 MG capsule, TAKE ONE CAPSULE BY MOUTH THREE TIMES A DAY, Disp: , Rfl:     cetirizine (ZYRTEC) 10 MG tablet, TAKE ONE TABLET BY MOUTH DAILY, Disp: 30 tablet, Rfl: 5    dupilumab (DUPIXENT) 300 MG/2ML SOSY injection, 300 mg every 14 days, Disp: , Rfl:     ipratropium-albuterol (DUONEB) 0.5-2.5 (3) MG/3ML SOLN nebulizer solution, Inhale 3 mLs into the lungs every 4 hours as needed for Shortness of Breath, Disp: 360 mL, Rfl: 5    mometasone-formoterol (DULERA) 100-5 MCG/ACT inhaler, INHALE TWO PUFFS BY MOUTH TWICE A DAY, Disp: 13 g, Rfl: 5    diclofenac (VOLTAREN) 75 MG EC tablet, Take 1 tablet by mouth 2 times daily, Disp: 60 tablet, Rfl: 3    famotidine (PEPCID) 20 MG tablet, TAKE ONE TABLET BY MOUTH TWICE A DAY, Disp: , Rfl:     levalbuterol (XOPENEX HFA) 45 MCG/ACT inhaler, INHALE ONE TO TWO PUFFS BY MOUTH EVERY 6 HOURS AS NEEDED FOR WHEEZING OR SHORTNESS OF BREATH, Disp: 15 g, Rfl: 5    benzonatate (TESSALON) 200 MG capsule, Take 1 capsule by mouth 3 times daily as needed for Cough, Disp: 90 capsule, Rfl: 1    ondansetron (ZOFRAN ODT) 4 MG disintegrating tablet, Take 1 tablet by mouth every 8 hours as needed for Nausea or Vomiting, Disp: 12 tablet, Rfl: 0    esomeprazole Magnesium (NEXIUM) 20 MG PACK, Take 20 mg by mouth 2 times daily , Disp: , Rfl:     imipramine (TOFRANIL) 10 MG tablet, Take 10 mg by mouth nightly (Patient not taking: Reported on 12/1/2021), Disp: , Rfl:     metFORMIN (GLUCOPHAGE) 500 MG tablet, Take 500 mg by mouth daily (with breakfast) (Patient not taking: Reported on 12/1/2021), Disp: , Rfl:     Objective:   PHYSICAL EXAM:  /76   Pulse 97   Resp 16   Ht 5' 5\" (1.651 m)   Wt 288 lb 3.2 oz (130.7 kg)   SpO2 96% Comment: with RA  BMI 47.96 kg/m²    Constitutional:  No acute distress. Eyes: PERRL. Conjunctivae anicteric. ENT: Normal nose. Normal tongue. Neck:  Trachea is midline. No thyroid tenderness. Respiratory: No accessory muscle usage. No wheezes. No rales. No Rhonchi. Cardiovascular: Normal S1S2. No digit clubbing. No digit cyanosis. No LE edema. Psychiatric: No anxiety or Agitation. Alert and Oriented to person, place and time. LABS:  Reviewed any pertinent new labs that are available.     Neg aspergillus Ab    PFTs 8/31/16 FVC  (64%) FEV1 1.97 (62%) FEV1/FVC ratio    TLC 2.24 (64%)  RV  (%)   DLCO (70%) Bronchodilator response: no    Allergic to digs, weeds, trees, molds    IMAGING:  I personally reviewed and interpreted the following today in the office: 10/19/15 Chest CT:  Impression: Small multifocal groundglass opacities in the lungs   predominantly the upper lobes. Small multifocal pneumonias are   suspected. Bronchial wall thickening is also present in the upper   lungs probably due to a superimposed acute bronchitis. No pleural   effusion. Cholelithiasis. 2/7/16 Chest CT: IMPRESSION:   1. Consolidation in both lower lobes, right greater than left, with small   right pleural effusion, consistent with pneumonia. A patchy 9 mm opacity   within the left upper lobe is new from prior exam, and favored to also be   infectious in etiology. 2. Gallstones. 2/15/16 CXR: LLL has cleared and RLL improved but still has basilar ASD. No pleural effusion seen. 4/15/16 CXR:  right elevated hemidiaphragm and right basilar atelectasis    8/31/16 SNIFF test: impaired right diaphragm    2/14/18 cxr: The lungs are without acute focal process.  There is no effusion or   pneumothorax. The cardiomediastinal silhouette is without acute process. The   osseous structures are without acute process. Impression   No acute process. ASSESSMENT:   Asthma - mod persistent.  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

## 2022-01-19 DIAGNOSIS — J45.40 MODERATE PERSISTENT ASTHMA WITHOUT COMPLICATION: ICD-10-CM

## 2022-01-20 ENCOUNTER — TELEPHONE (OUTPATIENT)
Dept: PULMONOLOGY | Age: 42
End: 2022-01-20

## 2022-01-20 DIAGNOSIS — U07.1 COVID-19: Primary | ICD-10-CM

## 2022-01-20 RX ORDER — NEBULIZER AND COMPRESSOR
EACH MISCELLANEOUS
Qty: 1 EACH | Refills: 0 | Status: SHIPPED | OUTPATIENT
Start: 2022-01-20 | End: 2022-03-09

## 2022-01-20 NOTE — TELEPHONE ENCOUNTER
Pt called saying she just tested positive for COVID this morning  (bought a home test). She said she feels worse than she did the last time she had COVID at the end of Sept.  Pt ended up getting an antibody infusion in October @ [facility].  She's asking if Dr. Sandra November thinks she should be scheduled for another infusion since she's feeling worse with this round of COVID than the last.      Also mentioned she had COVID \"long-haulers\" symptoms (sick through Oct & Nov).       [facility]: [facility]

## 2022-01-20 NOTE — TELEPHONE ENCOUNTER
Pt went to urgent care in [place] 3 days ago for symptoms. Pt was not tested for covid, states that she was told it was not needed since everyone in household has covid currently. Physician at urgent care also thinks that pt has shingles due to back pain/itching. Pt is currently on Doxycycline for 7 days (has taken 2 days), and Prednisone 40 mg for 2 more days, then 20 mg for 2 days. Do you have the following symptoms? Shortness of Breath  yes  Wheezing  yes  Cough  yes  Cough Characteristics:  Productive    Not much at all  Sputum Color    n/a  Hemoptysis   n/a  Consistency of sputum   n/a     Fever:    Off and on    Temp:unsure  Chills/Sweats:  Chills/sweats  What other symptoms are you having?:  Pain/itching in back (dr thinks pt has shingles)    How long have you had these symptoms? 3 days     [facility]    Have you been vaccinated for covid? Yes  Have you received a booster vaccine? No          Review medications and allergies: Allergies? Allergies   Allergen Reactions    Other Dermatitis     BANDAIDS, ADHESIVES     Penicillins Swelling    Morphine Other (See Comments)     nausea and vomiting  nausea and vomiting  nausea and vomiting      Morphine And Related Other (See Comments)     nausea and vomiting      Oxycodone-Acetaminophen Rash    Percocet [Oxycodone-Acetaminophen] Rash    Sulfa Antibiotics Rash             Currently on Antibiotics? (Drug/Dose/Frequency and how long on?) Doxycycline 100 mg BID for 7 days,currently on day  2. Systemic Steroids? (Drug/Dose/Frequency and how long on?) Prednisone  40 mg for 1 more day, then 20 mg for 2 days (presribed by urgent care)     Last sick call taken on n/a. Meds prescribed were n/a, by n/a. Last OV 12/1/21 with Dr. Jess Tyson   (pull in last visit note assessment/plan)   ASSESSMENT:   · Asthma - mod persistent.  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 [place] 11/16/15, LE edema on lasix, Hiatal hernia s/p fundoplication 4/1150     PLAN:   · Dulera  · PRN  Xoponex   · PRN tessalon pearls  · flonase and zyrtec  · sinus rinses PRN   · On dupixent for urticaria  · Avoid second smoke from her   · F/u in July with PFTs/6MWT

## 2022-01-20 NOTE — TELEPHONE ENCOUNTER
Pt called back to say that she hasn't heard from [facility] yet. I told her that [facility] DID fax the order for Paxlovid this afternoon & they should be getting it ready for her soon. I also provided her with [facility] phone # so she can call them directly for an update, if need be.

## 2022-01-27 ENCOUNTER — TELEPHONE (OUTPATIENT)
Dept: PULMONOLOGY | Age: 42
End: 2022-01-27

## 2022-01-27 NOTE — TELEPHONE ENCOUNTER
Patient went to [facility] and said that she was dx bronchitis. Patient asking when she can receive the booster vaccine as she recently had covid19. Patient would like to know if she can take mucinex severe congestion & cough with the antibiotic and steroid. Patient asking if it will help break this mucos up in her chest. Patients has a   for her dad and needs to be better for it. Do you have the following symptoms? Shortness of Breath  yes  Wheezing  \"no in lungs, in bronchioles\"  Cough  yes  Cough Characteristics:  Productive    no  Sputum Color    n/a  Hemoptysis   no  Consistency of sputum   n/a     Fever:    no    Temp:n/a  Chills/Sweats:  Chills&sweats  What other symptoms are you having?:  congested    How long have you had these symptoms? 1 month - past couple weeks it has gotten worse      [facility]    Have you been vaccinated for covid? Yes  Have you received a booster vaccine? No          Review medications and allergies: Allergies? Allergies   Allergen Reactions    Other Dermatitis     BANDAIDS, ADHESIVES     Penicillins Swelling    Morphine Other (See Comments)     nausea and vomiting  nausea and vomiting  nausea and vomiting      Morphine And Related Other (See Comments)     nausea and vomiting      Oxycodone-Acetaminophen Rash    Percocet [Oxycodone-Acetaminophen] Rash    Sulfa Antibiotics Rash             Currently on Antibiotics? (Drug/Dose/Frequency and how long on?) Azithromycin 250mg 5 day pack-    Systemic Steroids? (Drug/Dose/Frequency and how long on?) yes - dexamethasone 4mg every 12 hours      Last sick call taken on 22. Meds prescribed were Paxlovid 3 BID x 5 days, by Dr. Enma Smith 2021 with Dr. Justyna Beltre   (pull in last visit note assessment/plan)     ASSESSMENT:   · Asthma - mod persistent.  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 Lezama Leaks 11/16/15, LE edema on lasix, Hiatal hernia s/p fundoplication [id number]/4309     PLAN:   · Dulera  · PRN  Xoponex   · PRN tessalon pearls  · flonase and zyrtec  · sinus rinses PRN   · On dupixent for urticaria  · Avoid second smoke from her   · F/u in July with PFTs/6MWT

## 2022-02-18 ENCOUNTER — TELEMEDICINE (OUTPATIENT)
Dept: PULMONOLOGY | Age: 42
End: 2022-02-18
Payer: COMMERCIAL

## 2022-02-18 DIAGNOSIS — E66.01 OBESITY, CLASS III, BMI 40-49.9 (MORBID OBESITY) (HCC): ICD-10-CM

## 2022-02-18 DIAGNOSIS — Z72.821 POOR SLEEP HYGIENE: ICD-10-CM

## 2022-02-18 DIAGNOSIS — F51.04 PSYCHOPHYSIOLOGICAL INSOMNIA: ICD-10-CM

## 2022-02-18 DIAGNOSIS — R53.83 OTHER FATIGUE: ICD-10-CM

## 2022-02-18 DIAGNOSIS — R06.83 SNORING: ICD-10-CM

## 2022-02-18 DIAGNOSIS — G25.81 RLS (RESTLESS LEGS SYNDROME): ICD-10-CM

## 2022-02-18 DIAGNOSIS — R51.9 MORNING HEADACHE: ICD-10-CM

## 2022-02-18 DIAGNOSIS — G47.10 HYPERSOMNIA: ICD-10-CM

## 2022-02-18 DIAGNOSIS — G47.33 MILD OBSTRUCTIVE SLEEP APNEA: Primary | ICD-10-CM

## 2022-02-18 PROCEDURE — 99204 OFFICE O/P NEW MOD 45 MIN: CPT | Performed by: NURSE PRACTITIONER

## 2022-02-18 PROCEDURE — G8427 DOCREV CUR MEDS BY ELIG CLIN: HCPCS | Performed by: NURSE PRACTITIONER

## 2022-02-18 RX ORDER — LINACLOTIDE 72 UG/1
CAPSULE, GELATIN COATED ORAL
COMMUNITY
Start: 2022-01-30 | End: 2022-03-09

## 2022-02-18 RX ORDER — LIDOCAINE HYDROCHLORIDE 20 MG/ML
SOLUTION OROPHARYNGEAL
COMMUNITY
Start: 2022-02-14 | End: 2022-03-09

## 2022-02-18 RX ORDER — GABAPENTIN 300 MG/1
CAPSULE ORAL
COMMUNITY
Start: 2022-02-16

## 2022-02-18 RX ORDER — KETOROLAC TROMETHAMINE 10 MG/1
TABLET, FILM COATED ORAL PRN
COMMUNITY
Start: 2022-01-27 | End: 2022-03-09

## 2022-02-18 RX ORDER — ZOLPIDEM TARTRATE 5 MG/1
TABLET ORAL
COMMUNITY
Start: 2022-02-03

## 2022-02-18 RX ORDER — INHALER, ASSIST DEVICES
SPACER (EA) MISCELLANEOUS
COMMUNITY
Start: 2022-01-24 | End: 2022-03-09

## 2022-02-18 RX ORDER — BUSPIRONE HYDROCHLORIDE 10 MG/1
TABLET ORAL
COMMUNITY
Start: 2022-02-05 | End: 2022-03-09

## 2022-02-18 RX ORDER — LIRAGLUTIDE 6 MG/ML
INJECTION SUBCUTANEOUS
COMMUNITY
Start: 2022-02-01 | End: 2022-03-09

## 2022-02-18 ASSESSMENT — SLEEP AND FATIGUE QUESTIONNAIRES
ESS TOTAL SCORE: 17
HOW LIKELY ARE YOU TO NOD OFF OR FALL ASLEEP WHILE SITTING AND TALKING TO SOMEONE: 1
HOW LIKELY ARE YOU TO NOD OFF OR FALL ASLEEP IN A CAR, WHILE STOPPED FOR A FEW MINUTES IN TRAFFIC: 2
HOW LIKELY ARE YOU TO NOD OFF OR FALL ASLEEP WHILE WATCHING TV: 2
HOW LIKELY ARE YOU TO NOD OFF OR FALL ASLEEP WHEN YOU ARE A PASSENGER IN A CAR FOR AN HOUR WITHOUT A BREAK: 2
HOW LIKELY ARE YOU TO NOD OFF OR FALL ASLEEP WHILE SITTING QUIETLY AFTER LUNCH WITHOUT ALCOHOL: 2
HOW LIKELY ARE YOU TO NOD OFF OR FALL ASLEEP WHILE SITTING INACTIVE IN A PUBLIC PLACE: 2
HOW LIKELY ARE YOU TO NOD OFF OR FALL ASLEEP WHILE SITTING AND READING: 3
HOW LIKELY ARE YOU TO NOD OFF OR FALL ASLEEP WHILE LYING DOWN TO REST IN THE AFTERNOON WHEN CIRCUMSTANCES PERMIT: 3

## 2022-02-18 NOTE — PROGRESS NOTES
in a car for an hour without a break 2 2 3 3 3   Lying down to rest in the afternoon when circumstances permit 3 2 3 3 3   Sitting and talking to someone 1 1 1 2 2   Sitting quietly after a lunch without alcohol 2 2 2 2 3   In a car, while stopped for a few minutes in traffic 2 1 1 2 1   Total score 17 13 14 18 21   Neck circumference (Inches) - 16.5 16.5 17 17       Past Medical History:  Past Medical History:   Diagnosis Date    Anesthesia complication     severe hypotension with block    Anxiety and depression     Arthritis     Asthma     Chronic bronchitis (HCC)     Fibromyalgia     Hypertension     Migraine     Pneumonia     Rash     Reflux     Sleep apnea     Wears glasses        Past Surgical History:        Procedure Laterality Date    ABDOMEN SURGERY      BRONCHOSCOPY  16     SECTION      x 2    COLONOSCOPY  2013    normal per pt    CYST REMOVAL      DENTAL SURGERY      teeth removed    ENDOSCOPY, COLON, DIAGNOSTIC      ESOPHAGEAL DILATATION      GALLBLADDER SURGERY      GASTRIC FUNDOPLICATION  3/5189    HYSTEROSCOPY  09/10/2015    Hysteroscopy, dilation and curettage with the MyoSure, NovaSure    OTHER SURGICAL HISTORY  9/10/15    HYSTEROSCOPY, DILATATION AND CURETTAGE WITH MYOSURE, NOVASURE    SHOULDER SURGERY      right    THROAT SURGERY Right 2015    vocal cord stripping    TUBAL LIGATION      UPPER GASTROINTESTINAL ENDOSCOPY  13    gastritis, hiatal hernia, hemorrhoids    UPPER GASTROINTESTINAL ENDOSCOPY      10/2015    UPPER GASTROINTESTINAL ENDOSCOPY  2016    URETHRA SURGERY         Allergies:  is allergic to other, penicillins, morphine, morphine and related, oxycodone-acetaminophen, percocet [oxycodone-acetaminophen], and sulfa antibiotics. Social History:    TOBACCO:   reports that she quit smoking about 6 years ago. Her smoking use included cigarettes. She has a 44.00 pack-year smoking history.  She has never used smokeless tobacco.  ETOH: reports current alcohol use.     Family History:       Problem Relation Age of Onset    Mental Illness Mother         suicide    Diabetes Father     High Blood Pressure Father     Kidney Disease Father     High Cholesterol Father     Asthma Neg Hx     Cancer Neg Hx     Emphysema Neg Hx     Heart Failure Neg Hx     Hypertension Neg Hx        Current Medications:    Current Outpatient Medications:     Nebulizers (INNOSPIRE ELEGANCE NEBULIZER) MISC, Please provide pt with a nebulizer kit () 1 X per 6 months., Disp: 1 each, Rfl: 0    methocarbamol (ROBAXIN) 750 MG tablet, , Disp: , Rfl:     cetirizine (ZYRTEC) 10 MG tablet, TAKE ONE TABLET BY MOUTH DAILY, Disp: 30 tablet, Rfl: 5    dupilumab (DUPIXENT) 300 MG/2ML SOSY injection, 300 mg every 14 days, Disp: , Rfl:     ipratropium-albuterol (DUONEB) 0.5-2.5 (3) MG/3ML SOLN nebulizer solution, Inhale 3 mLs into the lungs every 4 hours as needed for Shortness of Breath, Disp: 360 mL, Rfl: 5    mometasone-formoterol (DULERA) 100-5 MCG/ACT inhaler, INHALE TWO PUFFS BY MOUTH TWICE A DAY, Disp: 13 g, Rfl: 5    diclofenac (VOLTAREN) 75 MG EC tablet, Take 1 tablet by mouth 2 times daily, Disp: 60 tablet, Rfl: 3    levalbuterol (XOPENEX HFA) 45 MCG/ACT inhaler, INHALE ONE TO TWO PUFFS BY MOUTH EVERY 6 HOURS AS NEEDED FOR WHEEZING OR SHORTNESS OF BREATH, Disp: 15 g, Rfl: 5    benzonatate (TESSALON) 200 MG capsule, Take 1 capsule by mouth 3 times daily as needed for Cough, Disp: 90 capsule, Rfl: 1    ondansetron (ZOFRAN ODT) 4 MG disintegrating tablet, Take 1 tablet by mouth every 8 hours as needed for Nausea or Vomiting, Disp: 12 tablet, Rfl: 0    esomeprazole Magnesium (NEXIUM) 20 MG PACK, Take 20 mg by mouth 2 times daily , Disp: , Rfl:     busPIRone (BUSPAR) 10 MG tablet, , Disp: , Rfl:     gabapentin (NEURONTIN) 300 MG capsule, , Disp: , Rfl:     ketorolac (TORADOL) 10 MG tablet, as needed, Disp: , Rfl:     lidocaine viscous hcl (XYLOCAINE) 2 % SOLN solution, , Disp: , Rfl:     LINZESS 72 MCG CAPS capsule, , Disp: , Rfl:     VICTOZA 18 MG/3ML SOPN SC injection, , Disp: , Rfl:     Spacer/Aero-Holding Chambers (OPTICHAMBER CRISTINA) MISC, , Disp: , Rfl:     zolpidem (AMBIEN) 5 MG tablet, , Disp: , Rfl:     nirmatrelvir/ritonavir (PAXLOVID) 20 x 150 MG & 10 x 100MG, Take 3 tablets by mouth every 12 hours Take 3 tablets (two 150 mg nirmatrelvir and one 100 mg ritonavir tablets) by mouth every 12 hours for 5 days. (Patient not taking: Reported on 2/18/2022), Disp: 30 tablet, Rfl: 0    DULoxetine (CYMBALTA) 30 MG extended release capsule, , Disp: , Rfl:     famotidine (PEPCID) 20 MG tablet, TAKE ONE TABLET BY MOUTH TWICE A DAY (Patient not taking: Reported on 2/18/2022), Disp: , Rfl:     imipramine (TOFRANIL) 10 MG tablet, Take 10 mg by mouth nightly (Patient not taking: Reported on 12/1/2021), Disp: , Rfl:     metFORMIN (GLUCOPHAGE) 500 MG tablet, Take 500 mg by mouth daily (with breakfast) (Patient not taking: Reported on 12/1/2021), Disp: , Rfl:     Review of Systems  Constitutional: Negative for fever  HENT: Negative for sore throat  Eyes: Negative for redness   Respiratory: Negative for dyspnea, cough  Cardiovascular: Negative for chest pain  Gastrointestinal: Negative for vomiting, diarrhea   Genitourinary: Negative for hematuria   Musculoskeletal: +arthralgias   Skin: Negative for rash  Neurological: Negative for syncope  Hematological: Negative for adenopathy  Psychiatric/Behavorial: Negative for anxiety      Objective:   PHYSICAL EXAM:  There were no vitals taken for this visit. Physical Exam  Exam:  Gen: No acute distress, does not appear to be in pain. Appears well developed and nourished. HENT: Head is normocephalic and atraumatic. Normal appearing nose. External Ears normal.   Neck: No visualized mass. Trachea is midline   Eyes: EOM intact. No visible discharge.    Resp:No visualized signs of difficulty breathing or respiratory distress, speaking in full sentences. Respiratory effort normal.  Neuro: Awake. Alert. Able to follow commands. No facial asymmetry. Skin: No significant lesions or discoloration noted on facial skin    Musculoskeletal: Normal range of motion of the neck. Psych: Oriented x 3. No anxiety. Normal affect. DATA:   10/8/2015 PSG AHI 5.3/REM AHI 30.5, low SPO2 85%  10/14/2015 titration controlled sleep-related breathing disorder with CPAP, recommendations auto CPAP 8-12 cm H2O    CPAP download data:    Assessment:       · Mild ARJUN. No current treatment  · Snoring  · Observed sleep apnea   · Hypersomnia   · Sleep onset and maintenance insomniapsychophysiological hyperarousal, poor sleep hygiene, ARJUN likely contributing. Taking Ambien per PCP with some benefit  · Obesity  · Fibromyalgia  · Asthma and shortness of breathfollowed by Dr. Syed Mems:      · Treatment options were discussed with patient for ARJUN, including CPAP therapy, oral appliances and upper airway surgery. Patient is in favor of CPAP titration  CPAP titration. Advised to use CPAP 6-8 hrs at night and during naps. Replacement of mask, tubing, head straps every 3-6 months or sooner if damaged. Patient instructed to contact [employer] company for any mask, tubing or machine trouble shooting if problems arise. · Sleep hygiene  · D/W patient non pharmacological therapy for RLS including avoiding aggravating factors (sleep deprivation, mental alerting activities at time of rest, antihistamines), moderate regular exercise, leg message and heating pads/hot shower. · Cognitive behavioral therapy was discussed with patient including stimulus control and sleep restriction   · Recommend set bed/wake times, no naps in the daytime, no TV in bed, recommend sleep and comfortable bedding   · Avoidsedatives, alcohol and caffeinated drinks at bed time. · No driving motorized vehicles or operating heavy machinery while fatigue, drowsy or sleepy.    · Weight loss is also recommended as a long-term intervention. · Complications of ARJUN if not treated were discussed with patient patient to include systemic hypertension, pulmonary hypertension, cardiovascular morbidities, car accidents and all cause mortality. Discussed pathophysiology of ARJUN with patient today  Patient education handout provided regarding sleep tips and CPAP cleaning recommendations     Radu Khan, was evaluated through a synchronous (real-time) audio-video encounter. The patient (or guardian if applicable) is aware that this is a billable service, which includes applicable co-pays. This Virtual Visit was conducted with patient's (and/or legal guardian's) consent. The visit was conducted pursuant to the emergency declaration under the [address] [place] [address], [address] [place] [address] authority and the [employer] Act. Patient identification was verified, and a caregiver was present when appropriate. The patient was located at home in a state where the provider was licensed to provide care. Total time spent for this encounter: Not billed by time    --MACHO Garcia CNP on 2/18/2022 at 10:09 AM    An electronic signature was used to authenticate this note.

## 2022-02-18 NOTE — PATIENT INSTRUCTIONS
Absolutely no driving if sleepy. It is your responsibility not to drive if fatigued, tired, or sleepy     Sleep Hygiene. .. Tips for better sleep. .. Avoid naps. This will ensure you are sleepy at bedtime. If you have to take a nap, sleep less than 1 hour, before 3 pm.  Sleep only when sleepy; this reduces the time you are awake in bed. Regular exercise is recommended to help you deepen your sleep, but not within 4-6 hours of your bedtime. Timing of exercise is important, aim to exercise early in the morning or early afternoon. A light snack may help you fall asleep. Warm milk and foods high in the amino acid tryptophan, such as bananas, may help you to sleep  Be sure to avoid heavy, spicy or sugary foods 4-6 hours before bedtime and avoid at snack time. Stay away from stimulants such as caffeine and nicotine for at least 4-6 hours before bed. Stimulants can interfere with your ability to fall asleep. Caffeine is found in tea, cola, coffee, cocoa and chocolate and is best avoided at bedtime. Nicotine is found in tobacco products. Avoid alcohol 4-6 hours before bedtime. Alcohol has an immediate sleep-inducing effect, after a few hours when alcohol levels fall there is a stimulant or wake-up effect and will cause fragmented sleep. Sleep rituals are important. Give your body clues it is time to slow down and sleep. Examples include; yoga, deep breathing, listen to relaxing music, a hot bath or a few minutes of reading. Have a fixed bedtime and awakening time, Even on weekends! You will feel better keeping a regular sleep cycle, even if you are retired or not working. Get into your favorite sleep position. If not asleep in 30 minutes, get up and do something boring until you feel sleepy. Remember not to expose yourself to bright lights such as TV, phone or tablet screens. Only use your bed for sleeping. Do not use your bed as an office, workroom or recreation room. Use comfortable bedding.  Uncomfortable bedding can prevent good sleep. Ensure your bedroom is quiet and comfortable. A cooler room along with enough blankets to stay warm is recommended. If your room is too noisy, try a white noise machine. If too bright, try black out shades or an eye mask. Dont take worries to bed. Leave worries about work, school etc. behind you when you go to bed. Some people find it helpful to assign a worry period in the evening or late afternoon to write down your worries and get them out of your system. CPAP Equipment Cleaning and Disinfecting Schedule  Equipment Cleaning Frequency Instructions  Disinfecting Frequency   Non-Disposable Filters  Weekly Mild soapy water, Rinse, Air Dry Not Required   Disposable Filters Change as needed  2-4 weeks Do Not Wash Not Required   Hose/tubing Daily Mild soapy water, Rinse, Air Dry Once a week   Mask / Nasal Pillows Daily Mild soapy water, Rinse, Air Dry Once a week   Headgear Weekly Hand wash, Mild soapy water, Rinse, Dry  Not Required   Humidifier Daily Empty water daily  Mild soapy water, Rinse well, Air Dry  Once a week   CPAP Unit As Needed Dust with damp cloth,  No detergents or sprays Not Required         Disinfect (per schedule) with 1 part white vinegar and 3 parts water- soak mask and water chamber for 30 minutes every 1-2 weeks, more often if sick. Allow water/vinegar mixture to run through tubing. Allow all equipment to air dry. Drying Hints:   Always hang tubing away from direct sunlight, as this will cause the tubing to become yellow, brittle and crack over a period of time. DO NOT attach the wet tubing to your CPAP unit to blow-dry it. The moisture from the tubing can drain back into your machine. Moisture in your unit can cause sudden pressure increases or short circuits  DO's and DON'Ts:  - Don't use alcohol-based products to clean your mask, because it can cause the materials to become hard and brittle.    - Don't put headgear in the washer or dryer  - Don't use any caustic or household cleaning solutions such as bleach on your CPAP   equipment.  - Do follow the recommended cleaning schedule. - Do change your disposable filter frequently. Adapted From: [employer]/cleaning. shtm. These are general suggestions for all models please follow specific s recommendations and specific instructions                     The Sleep Center at [facility], [address] [place] [address]                      Phone: [phone] Fax: [phone]      [facility] at the time indicated. On Saturday and Sunday night a sleep tech will come down to let you in the building and escort you upstairs to the sleep center; please call from the parking lot if no one is at the door when you arrive. PLEASE DO NOT ARRIVE TO THE SLEEP CENTER ANY EARLIER THAN 8:30PM AS THERE IS NO STAFF ON DUTY AND THE DOORS WILL BE LOCKED    IMPORTANT: We ask that you please phone the [facility] Patient Pre-Services ([phone]) at least 3-5 days prior to your sleep study to pre-register. Failing to pre-register may ultimately cause your insurance to not pay for this procedure. Please be aware that [facility] is a non-smoking facility. There is no smoking allowed anywhere on [employer] property at any time. Each patient room is a private room with cable television, WiFi and a private bathroom with shower facilities. The test itself consists of electrodes and sensors attached to specific areas of your scalp, face, chest and legs. We will also monitor respiratory effort, nasal and oral airflow and oxygen levels. The test will not hurt; it is completely painless and not invasive in any way. Please bring with you:  ? Appropriate nightclothes (pajamas, sweats, etc.), slippers and robe  ?  All medications you need during your stay, including breathing medications, nebulizers and metered dose inhalers, as well as a complete list of all medications you are currently taking. ? Your own toiletries and hairdryer if you wish to shower before you leave  ? Current Photo I.D. and insurance card  ? We do not allow any pillows or bed linens from home due to health regulations  ? We recommend that you do not bring valuables with you to the Sleep Center as we cannot be responsible for any lost or damaged personal items. Please refrain from or reduce the use of caffeine and/or alcohol prior to your sleep study and avoid napping the day of your study as these will affect the accuracy of your test results. If you are ill the day of your test (cold, upper respiratory infection, flu, etc.) please call to reschedule your test as the test will not be accurate if you are ill. If you should need to cancel or reschedule your appointment, please call the Sleep Center at [phone] as soon as possible. Please also call the Sleep Center directly to let us know if you have any special needs, dietary needs or for any further questions.

## 2022-03-03 ENCOUNTER — HOSPITAL ENCOUNTER (OUTPATIENT)
Dept: SLEEP CENTER | Age: 42
Discharge: HOME | End: 2022-03-05
Payer: COMMERCIAL

## 2022-03-03 DIAGNOSIS — G25.81 RLS (RESTLESS LEGS SYNDROME): ICD-10-CM

## 2022-03-03 DIAGNOSIS — R06.83 SNORING: ICD-10-CM

## 2022-03-03 DIAGNOSIS — Z72.821 POOR SLEEP HYGIENE: ICD-10-CM

## 2022-03-03 DIAGNOSIS — G47.10 HYPERSOMNIA: ICD-10-CM

## 2022-03-03 DIAGNOSIS — G47.33 MILD OBSTRUCTIVE SLEEP APNEA: ICD-10-CM

## 2022-03-03 DIAGNOSIS — R53.83 OTHER FATIGUE: ICD-10-CM

## 2022-03-03 DIAGNOSIS — F51.04 PSYCHOPHYSIOLOGICAL INSOMNIA: ICD-10-CM

## 2022-03-03 DIAGNOSIS — R51.9 MORNING HEADACHE: ICD-10-CM

## 2022-03-03 DIAGNOSIS — E66.01 OBESITY, CLASS III, BMI 40-49.9 (MORBID OBESITY) (HCC): ICD-10-CM

## 2022-03-03 PROCEDURE — 95811 POLYSOM 6/>YRS CPAP 4/> PARM: CPT

## 2022-03-07 PROBLEM — I10 ESSENTIAL (PRIMARY) HYPERTENSION: Status: ACTIVE | Noted: 2022-03-07

## 2022-03-07 PROBLEM — R60.0 BILATERAL LEG EDEMA: Status: ACTIVE | Noted: 2022-03-07

## 2022-03-07 PROCEDURE — 95811 POLYSOM 6/>YRS CPAP 4/> PARM: CPT | Performed by: INTERNAL MEDICINE

## 2022-03-07 NOTE — PROGRESS NOTES
[facility]  [phone]      Attending Physician: Kristen Ray    Reason for Consultation/Chief Complaint: New patient, bilateral edema    Subjective   History of Present Illness:  José Pink is a 42 y.o. female who presents today as a new patient for complaint of bilateral edema of foot and ankles. She has hx of essential hypertension and obstructive sleep apnea. Last ECHO testing done 9/22/15 showed an EF of 55-60%, limited visualization of the valves but no obvious severe abnormalities noted. Today she reports that she recently lost her father in 2021. He had previously been seen here at [facility]. She reports that she had COVID and Shingles in  and has since recovered. She coughed \"really\"hard and broke 3 bones in her back. She has chest pain, dull sometimes sharp. She seen podiatry for a torn ligament and was informed by the MD that she had edema. She reports that she has had this on and off for the last few years. She reports being hypoglycemic. She was told she is border line diabetic. She does not smoke. She has had elevated BP's, not on any medication for this. Denies sob, syncope, dizziness. Past Medical History:   has a past medical history of Anesthesia complication, Anxiety and depression, Arthritis, Asthma, Chronic bronchitis ([address] Fibromyalgia, Hypertension, Migraine, Pneumonia, Rash, Reflux, Sleep apnea, and Wears glasses. Surgical History:   has a past surgical history that includes  section; Tubal ligation; shoulder surgery; Dental surgery; cyst removal; Upper gastrointestinal endoscopy (13); hysteroscopy (09/10/2015); other surgical history (9/10/15); Upper gastrointestinal endoscopy; Endoscopy, colon, diagnostic; Throat surgery (Right, 2015); Colonoscopy (); Upper gastrointestinal endoscopy (); Abdomen surgery; Gastric fundoplication (8596); bronchoscopy (16);  Urethra surgery; Gallbladder surgery; and Esophagus dilation. Social History:   reports that she quit smoking about 6 years ago. Her smoking use included cigarettes. She has a 44.00 pack-year smoking history. She has never used smokeless tobacco. She reports current alcohol use. She reports that she does not use drugs. Family History:  family history includes Diabetes in her father; High Blood Pressure in her father; High Cholesterol in her father; Kidney Disease in her father; Mental Illness in her mother. Home Medications:  Were reviewed and are listed in nursing record and/or below  Prior to Admission medications    Medication Sig Start Date End Date Taking? Authorizing Provider   gabapentin (NEURONTIN) 300 MG capsule  2/16/22  Yes Historical Provider, MD   zolpidem (AMBIEN) 5 MG tablet  2/3/22  Yes Historical Provider, MD   methocarbamol (ROBAXIN) 750 MG tablet  10/17/21  Yes Historical Provider, MD   dupilumab (DUPIXENT) 300 MG/2ML SOSY injection 300 mg every 14 days 4/29/21  Yes Historical Provider, MD   benzonatate (TESSALON) 200 MG capsule Take 1 capsule by mouth 3 times daily as needed for Cough 6/10/20  Yes Bindu Peña MD   nirmatrelvir/ritonavir (PAXLOVID) 20 x 150 MG & 10 x 100MG Take 3 tablets by mouth every 12 hours Take 3 tablets (two 150 mg nirmatrelvir and one 100 mg ritonavir tablets) by mouth every 12 hours for 5 days. Patient not taking: Reported on 2/18/2022 1/20/22   Bindu Peña MD   metFORMIN (GLUCOPHAGE) 500 MG tablet Take 500 mg by mouth daily (with breakfast)  Patient not taking: Reported on 12/1/2021    Historical Provider, MD        CURRENT Medications:  No current facility-administered medications for this visit. Allergies: Other, Penicillins, Morphine, Morphine and related, Oxycodone-acetaminophen, Percocet [oxycodone-acetaminophen], and Sulfa antibiotics     Review of Systems:   A 14 point review of symptoms completed.  Pertinent positives identified in the HPI, all other review of symptoms negative as below.      Objective   PHYSICAL EXAM:    Vitals:    03/09/22 1045   BP: 126/82   Pulse: 99   SpO2: 97%    Weight: 294 lb 8 oz (133.6 kg)     Gen: Patient in NAD, resting comfortably  Neck: No JVD or bruits  Respiratory: CTAB no WRR  Chest: normal without deformity  Cardiovascular:RRR, S1S2, no mrg, normal PMI  Abdomen: Soft, NTND, Normal BS  Extremities: tc le edema   Neurological/Psychiatric: AxO x4, No gross motors/sensory deficits  Skin:  Warm and dry      Labs   CBC:   Lab Results   Component Value Date    WBC 11.1 10/19/2021    RBC 5.15 10/19/2021    HGB 13.0 10/19/2021    HCT 41.4 10/19/2021    MCV 80.4 10/19/2021    RDW 15.7 10/19/2021     10/19/2021     CMP:  Lab Results   Component Value Date     01/10/2019    K 4.0 01/10/2019    CL 99 01/10/2019    CO2 29 01/10/2019    BUN 12 01/10/2019    CREATININE 0.7 01/10/2019    GFRAA >60 01/10/2019    GFRAA >60 05/08/2013    AGRATIO 1.2 01/10/2019    LABGLOM >60 01/10/2019    GLUCOSE 95 01/10/2019    PROT 7.3 01/10/2019    PROT 7.0 09/12/2012    CALCIUM 9.2 01/10/2019    BILITOT <0.2 01/10/2019    ALKPHOS 95 01/10/2019    AST 12 01/10/2019    ALT 13 01/10/2019     PT/INR:  No results found for: PTINR  HgBA1c:No results found for: LABA1C  No results found for: CKTOTAL, CKMB, CKMBINDEX, TROPONINI      Cardiac Data     Last EKG:   nsr , irbbb, infer infarct     Echo: 9/22/15  Conclusions      Summary   Limited 2-D echocardiogram due to suboptimal imaging and technical   limitations. This is a suboptimal study due to poor echocardiographic   window. Global ejection fraction is normal and estimated from 55 % to 60 %. There is limited visualization of the valves but no obvious severe   abnormalities noted. Stress Test:    Cath:    Studies:       I have reviewed labs and imaging/xray/diagnostic testing in this note.     Assessment and Plan        Patient Active Problem List   Diagnosis    Glenoid labral tear    Glenoid labrum tear    Tobacco abuse  Allergic rhinitis due to allergen    Shortness of breath    Uncomplicated severe persistent asthma    Labral tear of shoulder    Superior glenoid labrum lesion of right shoulder    Abnormal CXR    Trochanteric bursitis of left hip    Diaphragm paralysis    ARJUN on CPAP    Obesity, Class III, BMI 40-49.9 (morbid obesity) (HCC)    Allergic rhinitis    Mild obstructive sleep apnea    Snoring    Hypersomnia    Other fatigue    Psychophysiological insomnia    Bilateral leg edema    Essential (primary) hypertension       Plan: 1. Call [phone] to schedule all cardiac testing  2. ECHO test-assess heart function, for cp, edema, abnl ekg   3. Cardiac MRI for covid myocarditis-to be done at [facility], to be done on a Thursday or Friday  4. Liliana [place] Stress test-assess heart strength, for cp, edema, abnl ekg   5. Referal to Dr. Malik stewart for venous insuffic   6. CT calcium  7. Follow up NP 3 months            Scribe's attestation: This note was scribed in the presence of Dr. Radha Tellez MD by Nat Martinez LPN    I, Dr Radha Tellez, personally performed the services described in this documentation, as scribed by the above signed scribe in my presence. It is both accurate and complete to my knowledge. I agree with the details independently gathered by the clinical support staff and the scribed note accurately describes my personal service to the patient. Thank you for allowing us to participate in the care of Addi Yoon. Please call me with any questions [phone].     Radha Tellez MD, [facility]   Interventional Cardiologist  [facility]  [phone] [facility]  [phone] [address] [place] [address]  3/9/2022 11:12 AM

## 2022-03-09 ENCOUNTER — OFFICE VISIT (OUTPATIENT)
Dept: CARDIOLOGY CLINIC | Age: 42
End: 2022-03-09
Payer: COMMERCIAL

## 2022-03-09 VITALS
HEART RATE: 99 BPM | WEIGHT: 293 LBS | BODY MASS INDEX: 48.82 KG/M2 | DIASTOLIC BLOOD PRESSURE: 82 MMHG | HEIGHT: 65 IN | OXYGEN SATURATION: 97 % | SYSTOLIC BLOOD PRESSURE: 126 MMHG

## 2022-03-09 DIAGNOSIS — M79.605 LEG PAIN, BILATERAL: ICD-10-CM

## 2022-03-09 DIAGNOSIS — I10 ESSENTIAL (PRIMARY) HYPERTENSION: ICD-10-CM

## 2022-03-09 DIAGNOSIS — I42.9 CARDIOMYOPATHY, UNSPECIFIED TYPE (HCC): ICD-10-CM

## 2022-03-09 DIAGNOSIS — G47.33 MILD OBSTRUCTIVE SLEEP APNEA: Primary | ICD-10-CM

## 2022-03-09 DIAGNOSIS — M79.604 LEG PAIN, BILATERAL: ICD-10-CM

## 2022-03-09 DIAGNOSIS — R60.0 BILATERAL LEG EDEMA: ICD-10-CM

## 2022-03-09 DIAGNOSIS — R07.9 CHEST PAIN, UNSPECIFIED TYPE: ICD-10-CM

## 2022-03-09 PROCEDURE — 99204 OFFICE O/P NEW MOD 45 MIN: CPT | Performed by: INTERNAL MEDICINE

## 2022-03-09 PROCEDURE — G8482 FLU IMMUNIZE ORDER/ADMIN: HCPCS | Performed by: INTERNAL MEDICINE

## 2022-03-09 PROCEDURE — G8417 CALC BMI ABV UP PARAM F/U: HCPCS | Performed by: INTERNAL MEDICINE

## 2022-03-09 PROCEDURE — G8427 DOCREV CUR MEDS BY ELIG CLIN: HCPCS | Performed by: INTERNAL MEDICINE

## 2022-03-09 PROCEDURE — 93000 ELECTROCARDIOGRAM COMPLETE: CPT | Performed by: INTERNAL MEDICINE

## 2022-03-09 PROCEDURE — 1036F TOBACCO NON-USER: CPT | Performed by: INTERNAL MEDICINE

## 2022-03-09 NOTE — PATIENT INSTRUCTIONS
Plan:  1. Call [phone] to schedule all cardiac testing  2. ECHO test-assess heart function  3. Cardiac MRI-to be done at [facility], to be done on a Thursday or Friday  4. Liliana NM Stress test-assess heart strength  5. Referal to Dr. Rosa stewart  6. CT calcium  7.   Follow up NP 3 months

## 2022-03-11 ENCOUNTER — TELEPHONE (OUTPATIENT)
Dept: PULMONOLOGY | Age: 42
End: 2022-03-11

## 2022-03-11 DIAGNOSIS — G47.33 OSA (OBSTRUCTIVE SLEEP APNEA): Primary | ICD-10-CM

## 2022-03-17 ENCOUNTER — HOSPITAL ENCOUNTER (OUTPATIENT)
Dept: NON INVASIVE DIAGNOSTICS | Age: 42
Discharge: HOME | End: 2022-03-17
Payer: COMMERCIAL

## 2022-03-17 ENCOUNTER — HOSPITAL ENCOUNTER (OUTPATIENT)
Dept: NUCLEAR MEDICINE | Age: 42
Discharge: HOME | End: 2022-03-17
Payer: COMMERCIAL

## 2022-03-17 ENCOUNTER — HOSPITAL ENCOUNTER (OUTPATIENT)
Dept: CT IMAGING | Age: 42
Discharge: HOME | End: 2022-03-17
Payer: COMMERCIAL

## 2022-03-17 ENCOUNTER — TELEPHONE (OUTPATIENT)
Dept: CARDIOLOGY CLINIC | Age: 42
End: 2022-03-17

## 2022-03-17 DIAGNOSIS — I10 ESSENTIAL (PRIMARY) HYPERTENSION: ICD-10-CM

## 2022-03-17 DIAGNOSIS — I10 ESSENTIAL (PRIMARY) HYPERTENSION: Primary | ICD-10-CM

## 2022-03-17 DIAGNOSIS — R07.9 CHEST PAIN, UNSPECIFIED TYPE: ICD-10-CM

## 2022-03-17 DIAGNOSIS — R06.02 SHORTNESS OF BREATH: ICD-10-CM

## 2022-03-17 LAB
LV EF: 60 %
LV EF: 60 %
LVEF MODALITY: NORMAL
LVEF MODALITY: NORMAL

## 2022-03-17 PROCEDURE — 6360000002 HC RX W HCPCS: Performed by: INTERNAL MEDICINE

## 2022-03-17 PROCEDURE — A9502 TC99M TETROFOSMIN: HCPCS | Performed by: INTERNAL MEDICINE

## 2022-03-17 PROCEDURE — 93017 CV STRESS TEST TRACING ONLY: CPT

## 2022-03-17 PROCEDURE — 93306 TTE W/DOPPLER COMPLETE: CPT

## 2022-03-17 PROCEDURE — 78452 HT MUSCLE IMAGE SPECT MULT: CPT

## 2022-03-17 PROCEDURE — 3430000000 HC RX DIAGNOSTIC RADIOPHARMACEUTICAL: Performed by: INTERNAL MEDICINE

## 2022-03-17 PROCEDURE — 75571 CT HRT W/O DYE W/CA TEST: CPT

## 2022-03-17 RX ORDER — AMINOPHYLLINE DIHYDRATE 25 MG/ML
500 INJECTION, SOLUTION INTRAVENOUS ONCE
Status: COMPLETED | OUTPATIENT
Start: 2022-03-17 | End: 2022-03-17

## 2022-03-17 RX ORDER — PRAVASTATIN SODIUM 10 MG
10 TABLET ORAL DAILY
Qty: 90 TABLET | Refills: 3 | Status: SHIPPED | OUTPATIENT
Start: 2022-03-17

## 2022-03-17 RX ADMIN — AMINOPHYLLINE 500 MG: 25 INJECTION, SOLUTION INTRAVENOUS at 12:33

## 2022-03-17 RX ADMIN — TETROFOSMIN 32.2 MILLICURIE: 1.38 INJECTION, POWDER, LYOPHILIZED, FOR SOLUTION INTRAVENOUS at 11:40

## 2022-03-17 RX ADMIN — REGADENOSON 0.4 MG: 0.08 INJECTION, SOLUTION INTRAVENOUS at 11:40

## 2022-03-17 NOTE — TELEPHONE ENCOUNTER
----- Message from Yissel Esposito MD sent at 3/17/2022 10:43 AM EDT -----  Let patient know their ct calcium test shows mild calcific coronary plaque, rec: pravastatin 10mg po qhs and get f/u lipids/lfts in 1-2mo. Let her know that statins can sometimes interact with the types of the meds she takes. I ran a check on that and didn't find any significant interactions, however, I would recommend that she follow-up with her pharmacy to double check on this before starting a statin. If she has any questions on this, she should reach out and let us know so we can further adjust medications, otherwise, if she feels comfortable with this and is able to check with her pharmacy, then she should go ahead and start the medication but if she does not choose to do so, she should stay in touch with us as well. thanks.

## 2022-03-17 NOTE — PROGRESS NOTES
Pt completed stress portion of cardiac stress test. Pt is discharged to nuclear department for final scan. Nuclear tech will remove PIV. Discharge instructions given to pt. Pt verbalizes understanding to discharge instructions.  Pt to return tomorrow for resting scan r/t 2-day test because of height and weight

## 2022-03-17 NOTE — TELEPHONE ENCOUNTER
----- Message from Yissel Esposito MD sent at 3/17/2022 12:25 PM EDT -----  Let patient know echo test shows normal heart function, mildly dilated aorta, no new orders or changes at this time. Thanks.

## 2022-03-17 NOTE — TELEPHONE ENCOUNTER
Created telephone encounter. Spoke with Pauline relayed message per [facility] regarding ct calcium score. Pt verbalized understanding, she will contact her [address] and call us back.

## 2022-03-17 NOTE — TELEPHONE ENCOUNTER
Let her know this would be considered a mild enlargement of heart muscle. This is something that can be monitored. Thank you. NBN

## 2022-03-17 NOTE — TELEPHONE ENCOUNTER
Created telephone encounter. Spoke with Pauline relayed message per [facility] regarding echo. Pt verbalized understanding. Pt read -There is mild concentric left ventricular hypertrophy. She wants to know what that means?

## 2022-03-17 NOTE — TELEPHONE ENCOUNTER
Let her know this would be considered a mild enlargement of heart muscle. This is something that can be monitored. Thank you.

## 2022-03-18 ENCOUNTER — APPOINTMENT (OUTPATIENT)
Dept: NUCLEAR MEDICINE | Age: 42
End: 2022-03-18
Payer: COMMERCIAL

## 2022-03-18 ENCOUNTER — HOSPITAL ENCOUNTER (OUTPATIENT)
Dept: NUCLEAR MEDICINE | Age: 42
Discharge: HOME | End: 2022-03-18
Payer: COMMERCIAL

## 2022-03-18 PROCEDURE — 3430000000 HC RX DIAGNOSTIC RADIOPHARMACEUTICAL: Performed by: INTERNAL MEDICINE

## 2022-03-18 PROCEDURE — A9502 TC99M TETROFOSMIN: HCPCS | Performed by: INTERNAL MEDICINE

## 2022-03-18 RX ADMIN — TETROFOSMIN 32 MILLICURIE: 1.38 INJECTION, POWDER, LYOPHILIZED, FOR SOLUTION INTRAVENOUS at 13:55

## 2022-03-21 ENCOUNTER — TELEPHONE (OUTPATIENT)
Dept: CARDIOLOGY CLINIC | Age: 42
End: 2022-03-21

## 2022-03-21 DIAGNOSIS — R06.00 DYSPNEA, UNSPECIFIED TYPE: Primary | ICD-10-CM

## 2022-03-21 DIAGNOSIS — R06.02 SHORTNESS OF BREATH: Primary | ICD-10-CM

## 2022-03-21 NOTE — TELEPHONE ENCOUNTER
Spoke with pt relayed message per [facility]. [facility] I am not sure what order to place? Can you please help place the order?

## 2022-03-21 NOTE — TELEPHONE ENCOUNTER
Order created for echo stress test.     Called pt to inform her of ordered placed and that she may contact Central Scheduling to schedule test.

## 2022-03-24 ENCOUNTER — TELEPHONE (OUTPATIENT)
Dept: CARDIOLOGY CLINIC | Age: 42
End: 2022-03-24

## 2022-03-28 ENCOUNTER — HOSPITAL ENCOUNTER (OUTPATIENT)
Dept: MRI IMAGING | Age: 42
Discharge: HOME | End: 2022-03-28
Payer: COMMERCIAL

## 2022-03-28 ENCOUNTER — TELEPHONE (OUTPATIENT)
Dept: CARDIOLOGY CLINIC | Age: 42
End: 2022-03-28

## 2022-03-28 DIAGNOSIS — R07.9 CHEST PAIN, UNSPECIFIED TYPE: ICD-10-CM

## 2022-03-28 DIAGNOSIS — I10 ESSENTIAL (PRIMARY) HYPERTENSION: ICD-10-CM

## 2022-03-28 LAB
LV EF: 58 %
LVEF MODALITY: NORMAL

## 2022-03-28 PROCEDURE — A9585 GADOBUTROL INJECTION: HCPCS | Performed by: INTERNAL MEDICINE

## 2022-03-28 PROCEDURE — 6360000004 HC RX CONTRAST MEDICATION: Performed by: INTERNAL MEDICINE

## 2022-03-28 PROCEDURE — 75561 CARDIAC MRI FOR MORPH W/DYE: CPT

## 2022-03-28 PROCEDURE — 75561 CARDIAC MRI FOR MORPH W/DYE: CPT | Performed by: INTERNAL MEDICINE

## 2022-03-28 RX ADMIN — GADOBUTROL 22 ML: 604.72 INJECTION INTRAVENOUS at 08:03

## 2022-03-28 NOTE — TELEPHONE ENCOUNTER
[facility] patient. Please the patient know cardiac MRI was normal shows no signs of COVID myocarditis.   Please follow-up with Dr. Bailey Whitman as previously discussed

## 2022-03-29 NOTE — TELEPHONE ENCOUNTER
Called and spoke with pt. Relayed message from Tahir Waller who is covering for [address]. Pt verbalized understanding of message given. Pt to have stress test done this Thursday 3/31 at [place] [address]. Pt to call back to let us know if there is any trouble with getting test done.

## 2022-03-30 NOTE — TELEPHONE ENCOUNTER
I called for peer to peer Dr. Mcdonald File did not order a cardiac CTA he ordered a cardiac calcium score.   These tests are not covered by insurance and is an outpatient charge only $99.  This is not something that we will fight, please the patient know this to be an outpatient charge if she shows desires to proceed with this

## 2022-03-31 ENCOUNTER — HOSPITAL ENCOUNTER (OUTPATIENT)
Dept: NON INVASIVE DIAGNOSTICS | Age: 42
Discharge: HOME | End: 2022-03-31
Payer: COMMERCIAL

## 2022-03-31 ENCOUNTER — TELEPHONE (OUTPATIENT)
Dept: CARDIOLOGY CLINIC | Age: 42
End: 2022-03-31

## 2022-03-31 DIAGNOSIS — R06.00 DYSPNEA, UNSPECIFIED TYPE: ICD-10-CM

## 2022-03-31 LAB
LV EF: 60 %
LVEF MODALITY: NORMAL

## 2022-03-31 PROCEDURE — 6360000004 HC RX CONTRAST MEDICATION: Performed by: INTERNAL MEDICINE

## 2022-03-31 PROCEDURE — 93320 DOPPLER ECHO COMPLETE: CPT

## 2022-03-31 PROCEDURE — 93325 DOPPLER ECHO COLOR FLOW MAPG: CPT

## 2022-03-31 PROCEDURE — C8928 TTE W OR W/O FOL W/CON,STRES: HCPCS

## 2022-03-31 PROCEDURE — 93017 CV STRESS TEST TRACING ONLY: CPT

## 2022-03-31 RX ADMIN — PERFLUTREN 3.3 MG: 6.52 INJECTION, SUSPENSION INTRAVENOUS at 09:36

## 2022-03-31 NOTE — TELEPHONE ENCOUNTER
----- Message from Dina Escalante MD sent at 3/31/2022  2:18 PM EDT -----  Dr. Anthony Self patient. Please the patient know that her cardiac MRI did not show any signs of COVID myocarditis.   Otherwise appears to be normal    Please let him know that stress echo was normal no signs of ischemia

## 2022-03-31 NOTE — LETTER
[facility] Cardiology  [facility] [address] [id number]  Phone: [phone]  Fax: [phone]    Louis Garcia MD      Cardiac Clearance Letter  March 31, 2022    [address] [facility]   1980      Blanco Buckley is cleared from a cardiac standpoint and may proceed with low risk for upcoming ankle surgery. If you have any questions or concerns, please don't hesitate to call.     Sincerely,        Louis Garcia MD

## 2022-05-23 RX ORDER — CETIRIZINE HYDROCHLORIDE 10 MG/1
TABLET ORAL
Qty: 30 TABLET | Refills: 5 | Status: SHIPPED | OUTPATIENT
Start: 2022-05-23

## 2022-06-03 ENCOUNTER — TELEPHONE (OUTPATIENT)
Dept: PULMONOLOGY | Age: 42
End: 2022-06-03

## 2022-06-03 ENCOUNTER — TELEMEDICINE (OUTPATIENT)
Dept: PULMONOLOGY | Age: 42
End: 2022-06-03
Payer: COMMERCIAL

## 2022-06-03 DIAGNOSIS — Z71.89 CPAP USE COUNSELING: ICD-10-CM

## 2022-06-03 DIAGNOSIS — Z72.821 POOR SLEEP HYGIENE: ICD-10-CM

## 2022-06-03 DIAGNOSIS — F51.04 PSYCHOPHYSIOLOGICAL INSOMNIA: ICD-10-CM

## 2022-06-03 DIAGNOSIS — G47.33 MILD OBSTRUCTIVE SLEEP APNEA: Primary | ICD-10-CM

## 2022-06-03 DIAGNOSIS — E66.01 OBESITY, CLASS III, BMI 40-49.9 (MORBID OBESITY) (HCC): ICD-10-CM

## 2022-06-03 PROCEDURE — G8427 DOCREV CUR MEDS BY ELIG CLIN: HCPCS | Performed by: NURSE PRACTITIONER

## 2022-06-03 PROCEDURE — 99213 OFFICE O/P EST LOW 20 MIN: CPT | Performed by: NURSE PRACTITIONER

## 2022-06-03 ASSESSMENT — SLEEP AND FATIGUE QUESTIONNAIRES
HOW LIKELY ARE YOU TO NOD OFF OR FALL ASLEEP WHILE LYING DOWN TO REST IN THE AFTERNOON WHEN CIRCUMSTANCES PERMIT: 3
HOW LIKELY ARE YOU TO NOD OFF OR FALL ASLEEP WHILE WATCHING TV: 2
HOW LIKELY ARE YOU TO NOD OFF OR FALL ASLEEP WHEN YOU ARE A PASSENGER IN A CAR FOR AN HOUR WITHOUT A BREAK: 1
HOW LIKELY ARE YOU TO NOD OFF OR FALL ASLEEP WHILE SITTING AND READING: 3
ESS TOTAL SCORE: 11
HOW LIKELY ARE YOU TO NOD OFF OR FALL ASLEEP WHILE SITTING INACTIVE IN A PUBLIC PLACE: 1
HOW LIKELY ARE YOU TO NOD OFF OR FALL ASLEEP WHILE SITTING AND TALKING TO SOMEONE: 0
HOW LIKELY ARE YOU TO NOD OFF OR FALL ASLEEP WHILE SITTING QUIETLY AFTER LUNCH WITHOUT ALCOHOL: 1
HOW LIKELY ARE YOU TO NOD OFF OR FALL ASLEEP IN A CAR, WHILE STOPPED FOR A FEW MINUTES IN TRAFFIC: 0

## 2022-06-03 NOTE — PROGRESS NOTES
Patient ID: Leopold Levee is a 36 y.o. female who is being seen today for   Chief Complaint   Patient presents with    Follow-up     31-90 (set up 4/27)         HPI:     Leopold Levee is a 36 y.o. female for televideo appointment via video and audio doxy. me virtual visit for ARJUN follow up. CPAP titration was reviewed by me and noted below. Results were dicussed with patient and multiple good questions were answered. Patient started CPAP after testing. States she is doing okay with CPAP does need a different mask. States she plans to go to [facility] to try a different mask today. States she feels more refreshed with it. Patient is using CPAP   5-6 hrs/night. Using humidifier. No snoring on CPAP. The pressure is well tolerated. The mask is not comfortable- nasal mask N30. No mask leak. No significant daytime sleepiness. No nodding off when driving. No dry nose or throat. No fatigue. Bedtime is 10 pm and rise time is 6 am. Sleep onset is few-60 minutes-plays on phone. Wakes up 0-2 times at night total. 0-1 nocturia. It takes few minutes to fall back a sleep. Occasional naps during the day. No headache specifically in am. No weight gain. No caffienated beverages during the day. No alcohol. ESS is 11            Initial HPI 2/18/22  Leopold Levee is a 36 y.o. female in office for ARJUN  evaluation. She was diagnosed with mild ARJUN in 2015. States she used CPAP for a short time states she did okay with CPAP when using it but had issues with pressure. She does not still have old CPAP. Patient reports snoring at night for the past unknown years. Wakes self snoring. Has witnessed apnea. Wakes up at night choking and gasping for air. No restorative sleep. +dry mouth upon awakening. Fatigue and tiredness during the day. States sleeps in recliner, no set times. Bedtime 9 pm-1 am and rise time is 8 am- but does wake up at 4 and 530 to get family to work or school. It takes few-60 minutes to fall asleep.   1-2 nocturia. Wakes up 3-4 times at night. It takes 20-60 minutes to fall back a sleep. States she is taking Ambien per PCP. Takes rare nap during the day. +headache in am. No car wrecks or near wrecks because of the sleepiness. No nodding off while driving but can get sleepy. No weight gain. Gained 30 pounds in the past 5 years. +forgetfulness and decreased concentration. Drinks 1-2 caffinated beverages per day. No significant alcohol.  +restless feelings in legs at night.  +teeth grinding, has dentures. Sometimes nightmares. No sleep walking. No night time panic attacks. No narcotics. No drug abuse. +history of depression. +history of anxiety working with PCP. No history of atrial fibrillation. +history of DM.+history of HTN. No history of ischemic heart disease. No history of stroke. ESS is 17. No smoking. No FH for narcolepsy.  +FH for ARJUN, RLS-dad    Sleep Medicine 6/3/2022 2022 2017 2017 2016 10/7/2015   Sitting and reading 3 3 2 2 2 3   Watching TV 2 2 2 1 2 3   Sitting, inactive in a public place (e.g. a theatre or a meeting) 1 2 1 1 2 3   As a passenger in a car for an hour without a break 1 2 2 3 3 3   Lying down to rest in the afternoon when circumstances permit 3 3 2 3 3 3   Sitting and talking to someone 0 1 1 1 2 2   Sitting quietly after a lunch without alcohol 1 2 2 2 2 3   In a car, while stopped for a few minutes in traffic 0 2 1 1 2 1   Total score 11 17 13 14 18 21   Neck circumference (Inches) - - 16.5 16.5 17 17       Past Medical History:  Past Medical History:   Diagnosis Date    Anesthesia complication     severe hypotension with block    Anxiety and depression     Arthritis     Asthma     Chronic bronchitis (HCC)     Fibromyalgia     Hypertension     Migraine     Pneumonia     Rash     Reflux     Sleep apnea     Wears glasses        Past Surgical History:        Procedure Laterality Date    ABDOMEN SURGERY      BRONCHOSCOPY  16     SECTION      x 2    COLONOSCOPY  2013    normal per pt    CYST REMOVAL      DENTAL SURGERY      teeth removed    ENDOSCOPY, COLON, DIAGNOSTIC      ESOPHAGEAL DILATATION      GALLBLADDER SURGERY      GASTRIC FUNDOPLICATION  4/3354    HYSTEROSCOPY  09/10/2015    Hysteroscopy, dilation and curettage with the MyoSure, NovaSure    OTHER SURGICAL HISTORY  9/10/15    HYSTEROSCOPY, DILATATION AND CURETTAGE WITH MYOSURE, NOVASURE    SHOULDER SURGERY      right    THROAT SURGERY Right 11/14/2015    vocal cord stripping    TUBAL LIGATION      UPPER GASTROINTESTINAL ENDOSCOPY  5/14/13    gastritis, hiatal hernia, hemorrhoids    UPPER GASTROINTESTINAL ENDOSCOPY      10/2015    UPPER GASTROINTESTINAL ENDOSCOPY  2016    URETHRA SURGERY         Allergies:  is allergic to other, penicillins, morphine, morphine and related, oxycodone-acetaminophen, percocet [oxycodone-acetaminophen], and sulfa antibiotics. Social History:    TOBACCO:   reports that she quit smoking about 6 years ago. Her smoking use included cigarettes. She has a 44.00 pack-year smoking history. She has never used smokeless tobacco.  ETOH:   reports current alcohol use.     Family History:       Problem Relation Age of Onset    Mental Illness Mother         suicide    Diabetes Father     High Blood Pressure Father     Kidney Disease Father     High Cholesterol Father     Asthma Neg Hx     Cancer Neg Hx     Emphysema Neg Hx     Heart Failure Neg Hx     Hypertension Neg Hx        Current Medications:    Current Outpatient Medications:     cetirizine (ZYRTEC) 10 MG tablet, TAKE ONE TABLET BY MOUTH DAILY, Disp: 30 tablet, Rfl: 5    DULERA 100-5 MCG/ACT inhaler, INHALE TWO PUFFS BY MOUTH TWICE A DAY, Disp: 13 g, Rfl: 5    pravastatin (PRAVACHOL) 10 MG tablet, Take 1 tablet by mouth daily, Disp: 90 tablet, Rfl: 3    benzonatate (TESSALON) 200 MG capsule, Take 1 capsule by mouth 3 times daily as needed for Cough, Disp: 90 capsule, Rfl: 1    gabapentin (NEURONTIN) 300 MG capsule, , Disp: , Rfl:     zolpidem (AMBIEN) 5 MG tablet, , Disp: , Rfl:     nirmatrelvir/ritonavir (PAXLOVID) 20 x 150 MG & 10 x 100MG, Take 3 tablets by mouth every 12 hours Take 3 tablets (two 150 mg nirmatrelvir and one 100 mg ritonavir tablets) by mouth every 12 hours for 5 days. (Patient not taking: Reported on 2/18/2022), Disp: 30 tablet, Rfl: 0    methocarbamol (ROBAXIN) 750 MG tablet, , Disp: , Rfl:     dupilumab (DUPIXENT) 300 MG/2ML SOSY injection, 300 mg every 14 days (Patient not taking: Reported on 6/3/2022), Disp: , Rfl:     metFORMIN (GLUCOPHAGE) 500 MG tablet, Take 500 mg by mouth daily (with breakfast) (Patient not taking: Reported on 12/1/2021), Disp: , Rfl:     Review of Systems      Objective:   PHYSICAL EXAM:  There were no vitals taken for this visit. Physical Exam  Exam:  Gen: No acute distress, does not appear to be in pain. Appears well developed and nourished. HENT: Head is normocephalic and atraumatic. Normal appearing nose. External Ears normal.   Neck: No visualized mass. Trachea is midline   Eyes: EOM intact. No visible discharge. Resp:No visualized signs of difficulty breathing or respiratory distress, speaking in full sentences. Respiratory effort normal.  Neuro: Awake. Alert. Able to follow commands. No facial asymmetry. Skin: No significant lesions or discoloration noted on facial skin    Musculoskeletal: Normal range of motion of the neck. Psych: Oriented x 3. No anxiety. Normal affect. DATA:   10/8/2015 PSG AHI 5.3/REM AHI 30.5, low SPO2 85%  10/14/2015 titration controlled sleep-related breathing disorder with CPAP, recommendations auto CPAP 8-12 cm H2O  3/3/2022 titration controlled ARJUN with CPAP, recommendation CPAP 10 cm H2O    CPAP download data:  Compliance download report from 5/3/22 to 6/1/22 reviewed today by me and showed patient is using machine 6:43 hrs/night with 97% compliance and AHI 0.9 within this time frame. 29/30days with greater than 4 hours of machine use. CPAP 10 cm H20     Assessment:       · Mild ARJUN. CPAP 10 cm H2O. Optimal compliance and efficacy on review today  · Snoring-resolved on CPAP  · Observed sleep apnea -resolved on CPAP  · Hypersomnia -improving  · Sleep onset and maintenance insomniapsychophysiological hyperarousal, poor sleep hygiene, ARJUN likely contributing. Taking Ambien per PCP with some benefit  · Obesity  · Fibromyalgia  · Asthma and shortness of breathfollowed by Dr. Ruddy Tinoco:      Continue CPAP 10 cm H2O  Advised to use CPAP 6-8 hrs at night and during naps. Replacement of mask, tubing, head straps every 3-6 months or sooner if damaged. Patient instructed to contact [employer] for any mask, tubing or machine trouble shooting if problems arise. · Sleep hygiene  · D/W patient non pharmacological therapy for RLS including avoiding aggravating factors (sleep deprivation, mental alerting activities at time of rest, antihistamines), moderate regular exercise, leg message and heating pads/hot shower. · Cognitive behavioral therapy was discussed with patient including stimulus control and sleep restriction   · Recommend set bed/wake times, no naps in the daytime, no TV in bed, recommend sleep and comfortable bedding   · Avoidsedatives, alcohol and caffeinated drinks at bed time. · No driving motorized vehicles or operating heavy machinery while fatigue, drowsy or sleepy. · Weight loss is also recommended as a long-term intervention. · Complications of ARJUN if not treated were discussed with patient patient to include systemic hypertension, pulmonary hypertension, cardiovascular morbidities, car accidents and all cause mortality. Discussed pathophysiology of ARJUN with patient today  Patient education handout provided regarding sleep tips and CPAP cleaning recommendations     Donna Mike was evaluated through a synchronous (real-time) audio-video encounter.  The patient (or guardian if applicable) is aware that this is a billable service, which includes applicable co-pays. This Virtual Visit was conducted with patient's (and/or legal guardian's) consent. The visit was conducted pursuant to the emergency declaration under the [address] [place] [address], [address] [place] [address] authority and the [employer] Act. Patient identification was verified, and a caregiver was present when appropriate. The patient was located at Home: [address] [place] [id number]. Provider was located at Home ([place] [address]): [place]. Total time spent for this encounter: Not billed by time    --MACHO Hubbard CNP on 6/3/2022 at 10:43 AM    An electronic signature was used to authenticate this note.

## 2022-06-24 ENCOUNTER — OFFICE VISIT (OUTPATIENT)
Dept: VASCULAR SURGERY | Age: 42
End: 2022-06-24
Payer: COMMERCIAL

## 2022-06-24 VITALS
HEIGHT: 65 IN | SYSTOLIC BLOOD PRESSURE: 128 MMHG | BODY MASS INDEX: 48.82 KG/M2 | DIASTOLIC BLOOD PRESSURE: 78 MMHG | WEIGHT: 293 LBS

## 2022-06-24 DIAGNOSIS — M79.89 LEG SWELLING: Primary | ICD-10-CM

## 2022-06-24 DIAGNOSIS — G25.81 RESTLESS LEG SYNDROME: ICD-10-CM

## 2022-06-24 DIAGNOSIS — I83.893 VARICOSE VEINS OF BOTH LEGS WITH EDEMA: ICD-10-CM

## 2022-06-24 PROCEDURE — G8417 CALC BMI ABV UP PARAM F/U: HCPCS | Performed by: SURGERY

## 2022-06-24 PROCEDURE — G8427 DOCREV CUR MEDS BY ELIG CLIN: HCPCS | Performed by: SURGERY

## 2022-06-24 PROCEDURE — 99202 OFFICE O/P NEW SF 15 MIN: CPT | Performed by: SURGERY

## 2022-06-24 NOTE — PROGRESS NOTES
TUBAL LIGATION      UPPER GASTROINTESTINAL ENDOSCOPY  5/14/13    gastritis, hiatal hernia, hemorrhoids    UPPER GASTROINTESTINAL ENDOSCOPY      10/2015    UPPER GASTROINTESTINAL ENDOSCOPY  2016    URETHRA SURGERY         Home Medications:   Prior to Admission medications    Medication Sig Start Date End Date Taking? Authorizing Provider   cetirizine (ZYRTEC) 10 MG tablet TAKE ONE TABLET BY MOUTH DAILY 5/23/22  Yes Brian Auguste MD   DULERA 100-5 MCG/ACT inhaler INHALE TWO PUFFS BY MOUTH TWICE A DAY 4/6/22  Yes Brian Auguste MD   benzonatate (TESSALON) 200 MG capsule Take 1 capsule by mouth 3 times daily as needed for Cough 6/10/20  Yes Brian Auguste MD   pravastatin (PRAVACHOL) 10 MG tablet Take 1 tablet by mouth daily 3/17/22   Kiersten Khan MD   gabapentin (NEURONTIN) 300 MG capsule  2/16/22   Historical Provider, MD   zolpidem (AMBIEN) 5 MG tablet  2/3/22   Historical Provider, MD   nirmatrelvir/ritonavir (PAXLOVID) 20 x 150 MG & 10 x 100MG Take 3 tablets by mouth every 12 hours Take 3 tablets (two 150 mg nirmatrelvir and one 100 mg ritonavir tablets) by mouth every 12 hours for 5 days. Patient not taking: Reported on 2/18/2022 1/20/22   Brian Auguste MD   methocarbamol (ROBAXIN) 750 MG tablet  10/17/21   Historical Provider, MD   dupilumab (DUPIXENT) 300 MG/2ML SOSY injection 300 mg every 14 days  Patient not taking: Reported on 6/3/2022 4/29/21   Historical Provider, MD   metFORMIN (GLUCOPHAGE) 500 MG tablet Take 500 mg by mouth daily (with breakfast)  Patient not taking: Reported on 12/1/2021    Historical Provider, MD        Allergies:   Other, Penicillins, Morphine, Morphine and related, Oxycodone-acetaminophen, Percocet [oxycodone-acetaminophen], and Sulfa antibiotics      Social History:      Social History     Socioeconomic History    Marital status: Single     Spouse name: Not on file    Number of children: Not on file    Years of education: Not on file    Highest education level: Not on file   Occupational History    Not on file   Tobacco Use    Smoking status: Former Smoker     Packs/day: 2.00     Years: 22.00     Pack years: 44.00     Types: Cigarettes     Quit date: 10/17/2015     Years since quittin.6    Smokeless tobacco: Never Used   Vaping Use    Vaping Use: Former    Substances: Always   Substance and Sexual Activity    Alcohol use: Yes     Alcohol/week: 0.0 standard drinks     Comment: rarely    Drug use: No    Sexual activity: Not Currently   Other Topics Concern    Not on file   Social History Narrative    Not on file     Social Determinants of Health     Financial Resource Strain:     Difficulty of Paying Living Expenses: Not on file   Food Insecurity:     Worried About Running Out of Food in the Last Year: Not on file    Marylou of Food in the Last Year: Not on file   Transportation Needs:     Lack of Transportation (Medical): Not on file    Lack of Transportation (Non-Medical):  Not on file   Physical Activity:     Days of Exercise per Week: Not on file    Minutes of Exercise per Session: Not on file   Stress:     Feeling of Stress : Not on file   Social Connections:     Frequency of Communication with Friends and Family: Not on file    Frequency of Social Gatherings with Friends and Family: Not on file    Attends Taoism Services: Not on file    Active Member of [address] [place] [address] [employer] or Organizations: Not on file    Attends Club or Organization Meetings: Not on file    Marital Status: Not on file   Intimate Partner Violence:     Fear of Current or Ex-Partner: Not on file    Emotionally Abused: Not on file    Physically Abused: Not on file    Sexually Abused: Not on file   Housing Stability:     Unable to Pay for Housing in the Last Year: Not on file    Number of [place] in the Last Year: Not on file    Unstable Housing in the Last Year: Not on file       Family History:        Problem Relation Age of Onset    Mental Illness Mother         suicide    Diabetes Father     High Blood Pressure Father     Kidney Disease Father     High Cholesterol Father     Asthma Neg Hx     Cancer Neg Hx     Emphysema Neg Hx     Heart Failure Neg Hx     Hypertension Neg Hx        Review of Systems:  A 14 point review of systems was completed. Pertinent positives identified in the HPI, all other review of systems negative. Physical Examination:    /78 (Site: Left Upper Arm)   Ht 5' 5\" (1.651 m)   Wt 294 lb (133.4 kg)   BMI 48.92 kg/m²     Weight: 294 lb (133.4 kg)       General appearance: NAD  Eyes: PERRLA  Neck: no JVD, no lymphadenopathy. Respiratory: effort is unlabored, no crackles, wheezes or rubs. Cardiovascular: regular, no murmur. No carotid bruits  Pulses:    DP PT   RIGHT 2 2   LEFT 2 2   GI: abdomen soft, nondistended, no organomegaly. Musculoskeletal: strength and tone normal.  Extremities: warm and pink. Bilateral edema. Scattered varicosities with large anterior/lateral left thigh varicosity  Skin: no dermatitis or ulceration. Neuro/psychiatric: grossly intact. Assessment:      Diagnosis Orders   1. Leg swelling     2. Varicose veins of both legs with edema     3. Restless leg syndrome           Recommendations/Plan:  Leg elevation and compression with 20-30mmHg support stockings. Weight loss. Consider trial of Requip for restless legs- patient to discuss with PCP.        Shonda Turpin MD, FACS

## 2022-06-27 NOTE — PROGRESS NOTES
myocarditis. On 2022 ov with Dr. Erika Pedroza MD vascular  Consider trail of requip for restless legs, patient to discuss with PCP. Today, presents with son. Started pravastatin, but was off for a while due to [place]. Admits does have chest pain and thought it could be from her anxiety. The patient denies  shortness of breath at rest and dyspnea with exertion. Denies palpitations, dizziness, near-syncope or ale syncope. Denies paroxysmal nocturnal dyspnea, orthopnea, bendopnea, increasing lower extremity edema or weight gain. Past Medical History:   has a past medical history of Anesthesia complication, Anxiety and depression, Arthritis, Asthma, Chronic bronchitis ([facility] [address] Fibromyalgia, Hypertension, Migraine, Pneumonia, Rash, Reflux, Sleep apnea, and Wears glasses. Surgical History:   has a past surgical history that includes  section; Tubal ligation; shoulder surgery; Dental surgery; cyst removal; Upper gastrointestinal endoscopy (13); hysteroscopy (09/10/2015); other surgical history (9/10/15); Upper gastrointestinal endoscopy; Endoscopy, colon, diagnostic; Throat surgery (Right, 2015); Colonoscopy (); Upper gastrointestinal endoscopy (); Abdomen surgery; Gastric fundoplication (9782); bronchoscopy (16); Urethra surgery; Gallbladder surgery; and Esophagus dilation. Social History:   reports that she quit smoking about 6 years ago. Her smoking use included cigarettes. She has a 44.00 pack-year smoking history. She has never used smokeless tobacco. She reports current alcohol use. She reports that she does not use drugs. Family History:  family history includes Diabetes in her father; High Blood Pressure in her father; High Cholesterol in her father; Kidney Disease in her father; Mental Illness in her mother.       Home Medications:  Were reviewed and are listed in nursing record and/or below  Prior to Admission medications    Medication Sig Start Date End Date Taking? Authorizing Provider   cetirizine (ZYRTEC) 10 MG tablet TAKE ONE TABLET BY MOUTH DAILY 5/23/22  Yes Antonio Florentino MD   DULERA 100-5 MCG/ACT inhaler INHALE TWO PUFFS BY MOUTH TWICE A DAY 4/6/22  Yes Antonio Florentino MD   pravastatin (PRAVACHOL) 10 MG tablet Take 1 tablet by mouth daily 3/17/22  Yes Trinidad Van MD   benzonatate (TESSALON) 200 MG capsule Take 1 capsule by mouth 3 times daily as needed for Cough 6/10/20  Yes Antonio Florentino MD   gabapentin (NEURONTIN) 300 MG capsule  2/16/22   Historical Provider, MD   zolpidem (AMBIEN) 5 MG tablet  2/3/22   Historical Provider, MD   nirmatrelvir/ritonavir (PAXLOVID) 20 x 150 MG & 10 x 100MG Take 3 tablets by mouth every 12 hours Take 3 tablets (two 150 mg nirmatrelvir and one 100 mg ritonavir tablets) by mouth every 12 hours for 5 days. Patient not taking: Reported on 2/18/2022 1/20/22   Antonio Florentino MD   methocarbamol (ROBAXIN) 750 MG tablet  10/17/21   Historical Provider, MD   dupilumab (DUPIXENT) 300 MG/2ML SOSY injection 300 mg every 14 days  Patient not taking: Reported on 6/3/2022 4/29/21   Historical Provider, MD   metFORMIN (GLUCOPHAGE) 500 MG tablet Take 500 mg by mouth daily (with breakfast)  Patient not taking: Reported on 12/1/2021    Historical Provider, MD        CURRENT Medications:  No current facility-administered medications for this visit. Allergies:  Dupixent [dupilumab], Other, Penicillins, Morphine, Morphine and related, Oxycodone-acetaminophen, Percocet [oxycodone-acetaminophen], and Sulfa antibiotics     Review of Systems:   A 14 point review of symptoms completed. Pertinent positives identified in the HPI, all other review of symptoms negative as below.       Objective   PHYSICAL EXAM:    Vitals:    06/28/22 0927   BP: 98/64   Pulse: 86   SpO2: 95%    Weight: 295 lb (133.8 kg)      Exam deferred this ov       Labs   CBC:   Lab Results   Component Value Date    WBC 11.1 10/19/2021    RBC 5.15 10/19/2021    HGB 13.0 10/19/2021 HCT 41.4 10/19/2021    MCV 80.4 10/19/2021    RDW 15.7 10/19/2021     10/19/2021     CMP:  Lab Results   Component Value Date     01/10/2019    K 4.0 01/10/2019    CL 99 01/10/2019    CO2 29 01/10/2019    BUN 12 01/10/2019    CREATININE 0.7 01/10/2019    GFRAA >60 01/10/2019    GFRAA >60 05/08/2013    AGRATIO 1.2 01/10/2019    LABGLOM >60 01/10/2019    GLUCOSE 95 01/10/2019    PROT 7.3 01/10/2019    PROT 7.0 09/12/2012    CALCIUM 9.2 01/10/2019    BILITOT <0.2 01/10/2019    ALKPHOS 95 01/10/2019    AST 12 01/10/2019    ALT 13 01/10/2019     PT/INR:  No results found for: PTINR  HgBA1c:No results found for: LABA1C  No results found for: CKTOTAL, CKMB, CKMBINDEX, TROPONINI      Cardiac Data     Last EKG:   nsr , irbbb, infer infarct     Echo: 9/22/15  Conclusions      Summary   Limited 2-D echocardiogram due to suboptimal imaging and technical   limitations. This is a suboptimal study due to poor echocardiographic   window. Global ejection fraction is normal and estimated from 55 % to 60 %. There is limited visualization of the valves but no obvious severe   abnormalities noted. Echo: 03/17/2022  Summary  LV systolic function is normal with EF estimated at 60%. No regional wall motion abnormalities are noted. There is mild concentric left ventricular hypertrophy. Normal left ventricular diastolic filling pressure. The aortic root is dilated 3.8cm. Prominent, calcified moderator band visualized in right venticle (normal variant structure). Unable to obtain SPAP due to lack of tricuspid regurgitation. Stress Test: Cathy Deed 03/17/2022  Summary  Normal LVEF >60%  Normal wall motion  Technically difficult study with heterogeneous tracer activity, there is  relative diffuse decrs uptake at stress vs rest Difficult to exclude ischemia on this study. Consider stress echo vs cardiac cath.     Echo Stress Test: 03/31/2022   Summary   Baseline resting echocardiogram shows normal global LV systolic function   with an ejection fraction of 60% and uniform myocardial segmental wall   motion. Following stress there was uniform augmentation of all myocardial   segments with appropriate hyperdynamic LV systolic response to stress with   an ejection fraction >65%  3/10 chest pain while laying down before starting the test.      No significant mitral or aortic regurgitation. Negative Stress Echocardiography Study for ischemia. Cath:    Studies:  CT Cardiac Calcium 03/17/2022  FINDINGS: LEFT MAIN: Zero (0). RIGHT CORONARY ARTERY: 2   LEFT ANTERIOR DESCENDING: Zero (0). CIRCUMFLEX: Zero (0). TOTAL AGATSTON CALCIUM SCORE: 2   EXTRACARDIAC STRUCTURES: No evidence of mediastinal or hilar lymphadenopathy. No pericardial effusion. No cardiomegaly. The right hemidiaphragm is elevated with bibasilar atelectasis. Status post gastric fundoplication. Visualized osseous structures demonstrate age related degenerative changes without acute abnormality. Total calcium score of 2 is within the 80th percentile for the patient's age of 41 y/o . No incidental clinically important extracardiac CT findings               Cardiac MRI: 03/28/2022  CONCLUSIONS   Overall unremarkable study with normal LV/RV size and function. No evidence of myocarditis.   -Normal left ventricular size and systolic function with a calculated ejection fraction of 58% by Smith's method. -Low normal LV global longitudinal strain (GLS) -16% -Normal right ventricular size and systolic function with a calculated ejection fraction of 68% by Smith's method. -Small pericardial effusion. -No myocardial edema by T2w imaging/mapping. (Normal myocardium/skeletal ratio - normal < 1.9). -Normal myocardial resting perfusion imaging. -On delayed enhancement imaging, there is no abnormal hyperenhancement to suggest myocardial scar/inflammation/infiltration. I have reviewed labs and imaging/xray/diagnostic testing in this note.     Assessment and Plan        Patient Active Problem List   Diagnosis    Glenoid labral tear    Glenoid labrum tear    Tobacco abuse    Allergic rhinitis due to allergen    Shortness of breath    Uncomplicated severe persistent asthma    Labral tear of shoulder    Superior glenoid labrum lesion of right shoulder    Abnormal CXR    Trochanteric bursitis of left hip    Diaphragm paralysis    ARJUN on CPAP    Obesity, Class III, BMI 40-49.9 (morbid obesity) (HCC)    Allergic rhinitis    Mild obstructive sleep apnea    Snoring    Hypersomnia    Other fatigue    Psychophysiological insomnia    Bilateral edema of lower extremity    Essential (primary) hypertension       Plan:  1. Reviewed recent testing. 2. Continue medications. Continue Pravastatin. 3. Follow up with family doctor regarding anxiety. 4. Follow up with us as needed. This note is scribed in the presence of . Dr. Mallory Segura MD  by Deena You, Dr Mallory Segura, personally performed the services described in this documentation, as scribed by the above signed scribe in my presence. It is both accurate and complete to my knowledge. I agree with the details independently gathered by the clinical support staff and the scribed note accurately describes my personal service to the patient. Thank you for allowing us to participate in the care of Alvina Hutchison. Please call me with any questions [phone].     Mallory Segura MD, [facility]   Interventional Cardiologist  [facility]  [phone] [facility]  [phone] [address] [place] [address]  6/28/2022 9:35 AM

## 2022-06-28 ENCOUNTER — OFFICE VISIT (OUTPATIENT)
Dept: CARDIOLOGY CLINIC | Age: 42
End: 2022-06-28
Payer: COMMERCIAL

## 2022-06-28 VITALS
OXYGEN SATURATION: 95 % | DIASTOLIC BLOOD PRESSURE: 64 MMHG | HEART RATE: 86 BPM | WEIGHT: 293 LBS | SYSTOLIC BLOOD PRESSURE: 98 MMHG | HEIGHT: 65 IN | BODY MASS INDEX: 48.82 KG/M2

## 2022-06-28 DIAGNOSIS — I10 ESSENTIAL (PRIMARY) HYPERTENSION: Primary | ICD-10-CM

## 2022-06-28 DIAGNOSIS — R60.0 BILATERAL EDEMA OF LOWER EXTREMITY: ICD-10-CM

## 2022-06-28 PROCEDURE — G8417 CALC BMI ABV UP PARAM F/U: HCPCS | Performed by: INTERNAL MEDICINE

## 2022-06-28 PROCEDURE — 1036F TOBACCO NON-USER: CPT | Performed by: INTERNAL MEDICINE

## 2022-06-28 PROCEDURE — 99212 OFFICE O/P EST SF 10 MIN: CPT | Performed by: INTERNAL MEDICINE

## 2022-06-28 PROCEDURE — G8427 DOCREV CUR MEDS BY ELIG CLIN: HCPCS | Performed by: INTERNAL MEDICINE

## 2022-06-28 NOTE — PATIENT INSTRUCTIONS
Plan:  1. Reviewed recent testing. 2. Continue medications. Continue Pravastatin. 3. Follow up with family doctor regarding anxiety. 4. Follow up with us as needed.

## 2022-07-06 ENCOUNTER — HOSPITAL ENCOUNTER (OUTPATIENT)
Dept: PULMONOLOGY | Age: 42
Discharge: HOME | End: 2022-07-06
Payer: COMMERCIAL

## 2022-07-06 DIAGNOSIS — J45.40 MODERATE PERSISTENT ASTHMA WITHOUT COMPLICATION: ICD-10-CM

## 2022-07-06 LAB
DLCO %PRED: 73 %
DLCO PRED: NORMAL
DLCO/VA %PRED: NORMAL
DLCO/VA PRED: NORMAL
DLCO/VA: NORMAL
DLCO: NORMAL
EXPIRATORY TIME-POST: NORMAL
EXPIRATORY TIME: NORMAL
FEF 25-75% %CHNG: NORMAL
FEF 25-75% %PRED-POST: NORMAL
FEF 25-75% %PRED-PRE: NORMAL
FEF 25-75% PRED: NORMAL
FEF 25-75%-POST: NORMAL
FEF 25-75%-PRE: NORMAL
FEV1 %PRED-POST: 75 %
FEV1 %PRED-PRE: 70 %
FEV1 PRED: NORMAL
FEV1-POST: NORMAL
FEV1-PRE: NORMAL
FEV1/FVC %PRED-POST: NORMAL
FEV1/FVC %PRED-PRE: NORMAL
FEV1/FVC PRED: NORMAL
FEV1/FVC-POST: 81 %
FEV1/FVC-PRE: 79 %
FVC %PRED-POST: NORMAL
FVC %PRED-PRE: NORMAL
FVC PRED: NORMAL
FVC-POST: NORMAL
FVC-PRE: NORMAL
GAW %PRED: NORMAL
GAW PRED: NORMAL
GAW: NORMAL
IC %PRED: NORMAL
IC PRED: NORMAL
IC: NORMAL
MEP: NORMAL
MIP: NORMAL
MVV %PRED-PRE: NORMAL
MVV PRED: NORMAL
MVV-PRE: NORMAL
PEF %PRED-POST: NORMAL
PEF %PRED-PRE: NORMAL
PEF PRED: NORMAL
PEF%CHNG: NORMAL
PEF-POST: NORMAL
PEF-PRE: NORMAL
RAW %PRED: NORMAL
RAW PRED: NORMAL
RAW: NORMAL
RV %PRED: NORMAL
RV PRED: NORMAL
RV: NORMAL
SVC %PRED: NORMAL
SVC PRED: NORMAL
SVC: NORMAL
TLC %PRED: 72 %
TLC PRED: NORMAL
TLC: NORMAL
VA %PRED: NORMAL
VA PRED: NORMAL
VA: NORMAL
VTG %PRED: NORMAL
VTG PRED: NORMAL
VTG: NORMAL

## 2022-07-06 PROCEDURE — 94729 DIFFUSING CAPACITY: CPT

## 2022-07-06 PROCEDURE — 94640 AIRWAY INHALATION TREATMENT: CPT

## 2022-07-06 PROCEDURE — 94060 EVALUATION OF WHEEZING: CPT

## 2022-07-06 PROCEDURE — 6370000000 HC RX 637 (ALT 250 FOR IP): Performed by: INTERNAL MEDICINE

## 2022-07-06 PROCEDURE — 94618 PULMONARY STRESS TESTING: CPT

## 2022-07-06 PROCEDURE — 94726 PLETHYSMOGRAPHY LUNG VOLUMES: CPT

## 2022-07-06 RX ORDER — ALBUTEROL SULFATE 90 UG/1
4 AEROSOL, METERED RESPIRATORY (INHALATION) ONCE
Status: COMPLETED | OUTPATIENT
Start: 2022-07-06 | End: 2022-07-06

## 2022-07-06 RX ADMIN — Medication 4 PUFF: at 09:19

## 2022-07-06 ASSESSMENT — PULMONARY FUNCTION TESTS
FEV1/FVC_POST: 81
FEV1_PERCENT_PREDICTED_POST: 75
FEV1_PERCENT_PREDICTED_PRE: 70
FEV1/FVC_PRE: 79

## 2022-07-06 NOTE — PROCEDURES
[address]                 [address] [address]                               PULMONARY FUNCTION    PATIENT NAME: Jose Sherman                   :        1980  MED REC NO:   [id number]                          ROOM:  ACCOUNT NO:   [de-identified]                           ADMIT DATE: 2022  PROVIDER:     Laurie Rodas MD    DATE OF PROCEDURE:  2022    INDICATION:  Asthma. FINDINGS:  1. Spirometry: The FEV1 is 2.15 liters, which is 70% of predicted. Forced vital capacity is 2.73 liters or 72% of predicted. The FEV1/FVC  ratio is normal.  Inhaled bronchodilators are given. There is no  significant improvement. 2.  Lung volumes: Total lung capacity is 3.92 liters or 72% of  predicted. 3.  Diffusion capacity:  DLCO is 18.61 mL/min/mmHg, which is 73% of  predicted. 4.  Flow volume loop is relatively normal.  5.  Six-minute walk test per [facility] protocol. Baseline oxygen  saturation 96%. Lowest oxygen saturation 95%. The patient ambulated  1260 feet. IMPRESSION:  1. No obstructive lung defect. 2.  Mild restrictive lung defect, which maybe partially explained by the  patient's obesity. 3.  Mild reduction in diffusion capacity. No significant oxyhemoglobin  desaturation on six-minute walk testing.         Mirela Rooney MD    D: 2022 13:53:40       T: 2022 15:30:08     [id number]  Job#: [id number]     Doc#: [id number]    CC:

## 2022-07-26 ENCOUNTER — OFFICE VISIT (OUTPATIENT)
Dept: PULMONOLOGY | Age: 42
End: 2022-07-26
Payer: COMMERCIAL

## 2022-07-26 ENCOUNTER — HOSPITAL ENCOUNTER (OUTPATIENT)
Dept: GENERAL RADIOLOGY | Age: 42
Discharge: HOME | End: 2022-07-26
Payer: COMMERCIAL

## 2022-07-26 ENCOUNTER — HOSPITAL ENCOUNTER (OUTPATIENT)
Age: 42
Discharge: HOME | End: 2022-07-26
Payer: COMMERCIAL

## 2022-07-26 VITALS
SYSTOLIC BLOOD PRESSURE: 145 MMHG | HEART RATE: 117 BPM | WEIGHT: 283 LBS | OXYGEN SATURATION: 97 % | HEIGHT: 65 IN | DIASTOLIC BLOOD PRESSURE: 70 MMHG | BODY MASS INDEX: 47.15 KG/M2

## 2022-07-26 DIAGNOSIS — R06.02 SOB (SHORTNESS OF BREATH): ICD-10-CM

## 2022-07-26 DIAGNOSIS — S27.809S: ICD-10-CM

## 2022-07-26 DIAGNOSIS — J45.909 UNCOMPLICATED ASTHMA, UNSPECIFIED ASTHMA SEVERITY, UNSPECIFIED WHETHER PERSISTENT: Primary | ICD-10-CM

## 2022-07-26 DIAGNOSIS — J44.9 CHRONIC OBSTRUCTIVE PULMONARY DISEASE, UNSPECIFIED COPD TYPE (HCC): ICD-10-CM

## 2022-07-26 PROCEDURE — 99214 OFFICE O/P EST MOD 30 MIN: CPT | Performed by: INTERNAL MEDICINE

## 2022-07-26 PROCEDURE — 71046 X-RAY EXAM CHEST 2 VIEWS: CPT

## 2022-07-26 RX ORDER — LEVALBUTEROL TARTRATE 45 UG/1
1 AEROSOL, METERED ORAL EVERY 4 HOURS PRN
Qty: 1 EACH | Refills: 3 | Status: SHIPPED | OUTPATIENT
Start: 2022-07-26 | End: 2023-07-26

## 2022-07-26 RX ORDER — MONTELUKAST SODIUM 10 MG/1
10 TABLET ORAL DAILY
Qty: 30 TABLET | Refills: 3 | Status: SHIPPED | OUTPATIENT
Start: 2022-07-26

## 2022-07-26 RX ORDER — OMEPRAZOLE 40 MG/1
40 CAPSULE, DELAYED RELEASE ORAL
Qty: 30 CAPSULE | Refills: 0 | Status: SHIPPED | OUTPATIENT
Start: 2022-07-26

## 2022-07-26 NOTE — PROGRESS NOTES
P  [facility]                                               Pulmonary Clinic Consult     I had the pleasure of seeing  Marvel Banks     Chief Complaint   Patient presents with    Follow-up     7 month PFT/6MW       HISTORY OF PRESENT ILLNESS:        Interval History: 42 y.o. female The pt had COVID 23 last Jan 2022 with significant cough and SOB however it is better . Still chewing nicotine gum 1 pack day ,she quit 2015     She states she still has some SOB and [place] and she has some cough  and [place]    SHE HAD diaphragm paralysis left side    She can 1/2 block ,abpout 2 00 feet     She recently broke Pet[employer] November . towrn ligament s/p surgery   She use xopenex as needed and she needed 2-3 time week  She use nebulizer   She use dulera    She developed allergy   No chest paoin ,no Hemoptysis     ALLERGIES:    Allergies   Allergen Reactions    Dupixent [Dupilumab]     Other Dermatitis     BANDAIDS, ADHESIVES     Penicillins Swelling    Morphine Other (See Comments)     nausea and vomiting  nausea and vomiting  nausea and vomiting      Morphine And Related Other (See Comments)     nausea and vomiting      Oxycodone-Acetaminophen Rash    Percocet [Oxycodone-Acetaminophen] Rash    Sulfa Antibiotics Rash       PAST MEDICAL HISTORY:       Diagnosis Date    Anesthesia complication     severe hypotension with block    Anxiety and depression     Arthritis     Asthma     Chronic bronchitis (HCC)     Fibromyalgia     Hypertension     Migraine     Pneumonia     Rash     Reflux     Sleep apnea     Wears glasses        MEDICATIONS:   Current Outpatient Medications   Medication Sig Dispense Refill    cetirizine (ZYRTEC) 10 MG tablet TAKE ONE TABLET BY MOUTH DAILY 30 tablet 5    DULERA 100-5 MCG/ACT inhaler INHALE TWO PUFFS BY MOUTH TWICE A DAY 13 g 5    pravastatin (PRAVACHOL) 10 MG tablet Take 1 tablet by mouth daily 90 tablet 3    gabapentin (NEURONTIN) 300 MG capsule methocarbamol (ROBAXIN) 750 MG tablet       benzonatate (TESSALON) 200 MG capsule Take 1 capsule by mouth 3 times daily as needed for Cough 90 capsule 1    zolpidem (AMBIEN) 5 MG tablet  (Patient not taking: No sig reported)      nirmatrelvir/ritonavir (PAXLOVID) 20 x 150 MG & 10 x 100MG Take 3 tablets by mouth every 12 hours Take 3 tablets (two 150 mg nirmatrelvir and one 100 mg ritonavir tablets) by mouth every 12 hours for 5 days. (Patient not taking: Reported on 2022) 30 tablet 0    dupilumab (DUPIXENT) 300 MG/2ML SOSY injection 300 mg every 14 days (Patient not taking: Reported on 6/3/2022)      metFORMIN (GLUCOPHAGE) 500 MG tablet Take 500 mg by mouth daily (with breakfast) (Patient not taking: Reported on 2021)       No current facility-administered medications for this visit.        SOCIAL AND OCCUPATIONAL HEALTH:  Social History     Tobacco Use   Smoking Status Former    Packs/day: 2.00    Years: 22.00    Pack years: 44.00    Types: Cigarettes    Quit date: 10/17/2015    Years since quittin.7   Smokeless Tobacco Never     TB :  Pets   Industrial exposure:  Birds :    SURGICAL HISTORY:   Past Surgical History:   Procedure Laterality Date    ABDOMEN SURGERY      BRONCHOSCOPY  15     SECTION      x 2    COLONOSCOPY  2013    normal per pt    CYST REMOVAL      DENTAL SURGERY      teeth removed    ENDOSCOPY, COLON, DIAGNOSTIC      ESOPHAGEAL DILATATION      GALLBLADDER SURGERY      GASTRIC FUNDOPLICATION  3/2987    HYSTEROSCOPY  09/10/2015    Hysteroscopy, dilation and curettage with the Luca Mcintosh    OTHER SURGICAL HISTORY  9/10/15    HYSTEROSCOPY, DILATATION AND CURETTAGE WITH MYOSURE, NOVASURE    SHOULDER SURGERY      right    THROAT SURGERY Right 2015    vocal cord stripping    TUBAL LIGATION      UPPER GASTROINTESTINAL ENDOSCOPY  13    gastritis, hiatal hernia, hemorrhoids    UPPER GASTROINTESTINAL ENDOSCOPY      10/2015    UPPER GASTROINTESTINAL ENDOSCOPY   URETHRA SURGERY         FAMILY HISTORY:   Lung cancer:  DVT or PE     REVIEW OF SYSTEMS:  Constitutional: General health is good . There has been no weight changes. No fevers, fatigue or weakness. Head: Patient denies any history of trauma, convulsive disorder or syncope. Skin:  Patient denies history of changes in pigmentation, eruptions or pruritus. No easy bruising or bleeding. EENT: no nasal congestion   Cardiovascular ,No chest pain ,No edema ,  Respiration:SOB: + ,LEYVA :+  Gastrointestinal:No GI bleed ,no melena  ,no hematemesis    Musculoskeletal: no joint pain ,no swelling  Neurological:no , syncope. Denies twitching, convulsions, loss of consciousness or memory. Endocrine:  . No history of goiter, exophthalmos or dryness of skin. The patient has no history of diabetes. Hematopoietic:  No history of bleeding disorders or easy bruising. Rheumatic:  No connective tissue disease history or polyarthritis/inflammatory joint disease. PHYSICAL EXAMINATION:  Vitals:    07/26/22 1545   BP: (!) 145/70   Pulse: (!) 117   SpO2: 97%     Constitutional: This is a well developed, well nourished 36y.o. year old female who is alert, oriented, cooperative and in no apparent distress. Head was normocephalic and atraumatic. EENT: Mallampati class :  Extraocular muscles intact. External canals are patent and no discharge was appreciated. Septum was midline,   mucosa was without erythema, exudates or cobblestoning. No thrush was noted. Neck: Supple without thyromegaly. No elevated JVP. Trachea was midline. No carotid bruits were auscultated. Respiratory: Rhonchi bilteral    Cardiovascular: Regular without murmur, clicks, gallops or rubs. There is no left or right ventricular heave. Pulses:  Carotid, radial and femoral pulses were equally bilaterally. Abdomen: Slightly rounded and soft without organomegaly. No rebound, rigidity or guarding was appreciated.     Lymphatic: No lymphadenopathy. Musculoskeletal: has right ankle surgery. Extremities: no edema. ,left ankle  Skin:  Warm and dry. Good color, turgor and pigmentation. No lesions or scars. Neurological/Psychiatric: The patient's general behavior, level of consciousness, thought content and emotional status is normal.  Cranial nerves II-XII are intact. DATA:   1. Spirometry: The FEV1 is 2.15 liters, which is 70% of predicted. Forced vital capacity is 2.73 liters or 72% of predicted. The FEV1/FVC  ratio is normal.  Inhaled bronchodilators are given. There is no  significant improvement. 2.  Lung volumes: Total lung capacity is 3.92 liters or 72% of  predicted. 3.  Diffusion capacity:  DLCO is 18.61 mL/min/mmHg, which is 73% of  predicted. 4.  Flow volume loop is relatively normal.  5.  Six-minute walk test per [facility] protocol. Baseline oxygen  saturation 96%. Lowest oxygen saturation 95%. The patient ambulated  1260 feet. IMPRESSION:  1. No obstructive lung defect. 2.  Mild restrictive lung defect, which maybe partially explained by the  patient's obesity. 3.  Mild reduction in diffusion capacity. No significant oxyhemoglobin  desaturation on six-minute walk testing. Topher Hammond MD  IMPRESSION:    1-Asthma   2-ARJUN   3-Right diaphragm dysfunction: possibly related to lizeth procedurerECENT covid   4-RECENT COVID                 PLAN:      -keep machine   -Need Dulera  -xopenex as needed  -Sniff test to reassess diaphragm   -keep CPAP  - was allergic on dupoixent however she developed ,developed red sore and then blisster   Flu and Pneumovax as per primary  On elquis for DVT px  On PPI as per primary    Thank you for allowing me to participate in [facility].  I will keep following with you ,should you have any concerns ,please contact us at [facility] pulmonary office     Sincerely,        Rosanna Angel MD  Pulmonary & Critical Care Medicine     NOTE: This report was transcribed using voice recognition software. Every effort was made to ensure accuracy; however, inadvertent computerized transcription errors may be present.

## 2022-07-29 ENCOUNTER — TELEPHONE (OUTPATIENT)
Dept: PULMONOLOGY | Age: 42
End: 2022-07-29

## 2022-07-29 NOTE — TELEPHONE ENCOUNTER
Cassi De Jesus MD  You 35 minutes ago (2:14 PM)     SM    Non- critical results. Continue her current plan until follow up with Dr. Camilla Murillo.

## 2022-07-29 NOTE — TELEPHONE ENCOUNTER
Pt called in requesting CXR results. I told her we would call her after dr. Navya andujar has reviewed CXR. Pt stated that was done days ago and would like a call back today with her results so she knows what she's suppose to be doing. Pt stated she saw the report on [place] but doesn't understand what it means.

## 2022-08-09 ENCOUNTER — HOSPITAL ENCOUNTER (OUTPATIENT)
Dept: GENERAL RADIOLOGY | Age: 42
Discharge: HOME | End: 2022-08-09
Payer: COMMERCIAL

## 2022-08-09 DIAGNOSIS — S27.809S: ICD-10-CM

## 2022-08-09 PROCEDURE — 76000 FLUOROSCOPY <1 HR PHYS/QHP: CPT

## 2022-09-01 RX ORDER — OMEPRAZOLE 40 MG/1
CAPSULE, DELAYED RELEASE ORAL
Qty: 30 CAPSULE | Refills: 0 | OUTPATIENT
Start: 2022-09-01

## 2022-09-14 RX ORDER — MOMETASONE FUROATE AND FORMOTEROL FUMARATE DIHYDRATE 200; 5 UG/1; UG/1
AEROSOL RESPIRATORY (INHALATION)
Qty: 13 G | Refills: 5 | Status: SHIPPED | OUTPATIENT
Start: 2022-09-14

## 2022-11-16 RX ORDER — MONTELUKAST SODIUM 10 MG/1
TABLET ORAL
Qty: 30 TABLET | Refills: 5 | Status: SHIPPED | OUTPATIENT
Start: 2022-11-16

## 2022-12-12 RX ORDER — CETIRIZINE HYDROCHLORIDE 10 MG/1
TABLET ORAL
Qty: 30 TABLET | Refills: 5 | Status: SHIPPED | OUTPATIENT
Start: 2022-12-12

## 2023-01-08 ENCOUNTER — OFFICE VISIT (OUTPATIENT)
Dept: URGENT CARE | Age: 43
End: 2023-01-08

## 2023-01-08 VITALS
RESPIRATION RATE: 16 BRPM | TEMPERATURE: 97.7 F | WEIGHT: 293 LBS | HEART RATE: 104 BPM | HEIGHT: 65 IN | OXYGEN SATURATION: 96 % | SYSTOLIC BLOOD PRESSURE: 130 MMHG | BODY MASS INDEX: 48.82 KG/M2 | DIASTOLIC BLOOD PRESSURE: 85 MMHG

## 2023-01-08 DIAGNOSIS — K04.7 DENTAL INFECTION: Primary | ICD-10-CM

## 2023-01-08 RX ORDER — CLINDAMYCIN HYDROCHLORIDE 300 MG/1
300 CAPSULE ORAL 3 TIMES DAILY
Qty: 21 CAPSULE | Refills: 0 | Status: SHIPPED | OUTPATIENT
Start: 2023-01-08 | End: 2023-01-15

## 2023-01-08 NOTE — PATIENT INSTRUCTIONS
Clindamycin three times daily for one week  Warm salt water gurgles after eating  Be sure to clean dentures nightly  Follow up with dentist as soon as able.

## 2023-01-08 NOTE — PROGRESS NOTES
Jalen Calloway (:  1980) is a 36 y.o. female,New patient, here for evaluation of the following chief complaint(s):  Oral Pain (Sore on left side, under false teeth)      ASSESSMENT/PLAN:  1. Dental infection  Dental infection left lower jaw  PCN allergy - Clindamycin as directed  Clindamycin three times daily for one week  Warm salt water gurgles after eating  Be sure to clean dentures nightly  Follow up with dentist as soon as able. - clindamycin (CLEOCIN) 300 MG capsule; Take 1 capsule by mouth 3 times daily for 7 days  Dispense: 21 capsule; Refill: 0     Return in about 1 week (around 1/15/2023) for With dentist. .    Manan Pierce:  Patient comes in today for left lower gum pain and facial swelling for a couple weeks, noticed black area on gums last night. Denies any fevers or drainage from gums. Denies use of chewing tobacco, patient is a smoker. Has seen dentist in last 6 months for full dentures, hx of bone loss to mandible. History provided by:  Patient   used: No    Oral Pain   Pertinent negatives include no fever. Vitals:    23 1414   BP: 130/85   Site: Left Upper Arm   Position: Sitting   Cuff Size: Large Adult   Pulse: (!) 104   Resp: 16   Temp: 97.7 °F (36.5 °C)   SpO2: 96%   Weight: (!) 304 lb (137.9 kg)   Height: 5' 5\" (1.651 m)       Review of Systems   Constitutional:  Negative for fatigue and fever. HENT:  Positive for dental problem. Physical Exam  Vitals reviewed. Constitutional:       Appearance: Normal appearance. HENT:      Nose: Nose normal. No congestion or rhinorrhea. Mouth/Throat:      Mouth: Mucous membranes are moist.      Dentition: Dentures: Full. Dental tenderness and gum lesions (Black spot size of ball point pen tip approx location of tooth 19) present. No gingival swelling, dental caries or dental abscesses. Pharynx: Oropharynx is clear.  No pharyngeal swelling, oropharyngeal exudate, posterior oropharyngeal erythema or uvula swelling. Comments: Left lower jaw swelling coincides with area of tenderness and black spot on gums. No visible drainage, surrounding gums non erythematous. Cardiovascular:      Rate and Rhythm: Regular rhythm. Tachycardia present. Pulses: Normal pulses. Heart sounds: Normal heart sounds. No murmur heard. No friction rub. No gallop. Skin:     General: Skin is warm and dry. Neurological:      Mental Status: She is alert. An electronic signature was used to authenticate this note.     --MACHO Cardona - CNP

## 2023-01-18 ENCOUNTER — TELEMEDICINE (OUTPATIENT)
Dept: PULMONOLOGY | Age: 43
End: 2023-01-18
Payer: COMMERCIAL

## 2023-01-18 DIAGNOSIS — J44.9 CHRONIC OBSTRUCTIVE PULMONARY DISEASE, UNSPECIFIED COPD TYPE (HCC): Primary | ICD-10-CM

## 2023-01-18 PROCEDURE — G8484 FLU IMMUNIZE NO ADMIN: HCPCS | Performed by: INTERNAL MEDICINE

## 2023-01-18 PROCEDURE — 3023F SPIROM DOC REV: CPT | Performed by: INTERNAL MEDICINE

## 2023-01-18 PROCEDURE — 99213 OFFICE O/P EST LOW 20 MIN: CPT | Performed by: INTERNAL MEDICINE

## 2023-01-18 PROCEDURE — 1036F TOBACCO NON-USER: CPT | Performed by: INTERNAL MEDICINE

## 2023-01-18 PROCEDURE — G8417 CALC BMI ABV UP PARAM F/U: HCPCS | Performed by: INTERNAL MEDICINE

## 2023-01-18 PROCEDURE — G8427 DOCREV CUR MEDS BY ELIG CLIN: HCPCS | Performed by: INTERNAL MEDICINE

## 2023-01-18 NOTE — PROGRESS NOTES
P  [facility]                                               Pulmonary Clinic Consult     I had the pleasure of seeing  Sourav Horton     Chief Complaint   Patient presents with    Follow-up     Patient is doing a televisit for her 6 month follow up for asthma. Patient states there has been no changes since last OV. Patient has no concerns as of now. HISTORY OF PRESENT ILLNESS:        Interval History: 42 y.o. female The pt had COVID 23 last Jan 2022 with significant cough and SOB however it is better . Still chewing nicotine gum 1 pack day ,she quit 2015     She states she still has some SOB and PEOPLES and she has some cough  and Peoples    SHE HAD diaphragm paralysis left side    She can 1/2 block ,abpout 2 00 feet     She recently broke Orvan Mccarthy November . towrn ligament s/p surgery   She use xopenex as needed and she needed 2-3 time week  She use nebulizer   She use dulera    She developed allergy   No chest paoin ,no Hemoptysis     Today visit  She has been doing fair  Still with SOB  No fever      ALLERGIES:    Allergies   Allergen Reactions    Dupixent [Dupilumab]     Other Dermatitis     BANDAIDS, ADHESIVES     Penicillins Swelling    Morphine Other (See Comments)     nausea and vomiting  nausea and vomiting  nausea and vomiting      Morphine And Related Other (See Comments)     nausea and vomiting      Oxycodone-Acetaminophen Rash    Percocet [Oxycodone-Acetaminophen] Rash    Sulfa Antibiotics Rash       PAST MEDICAL HISTORY:       Diagnosis Date    Anesthesia complication     severe hypotension with block    Anxiety and depression     Arthritis     Asthma     Chronic bronchitis (HCC)     Fibromyalgia     Hypertension     Migraine     Pneumonia     Rash     Reflux     Sleep apnea     Wears glasses        MEDICATIONS:   Current Outpatient Medications   Medication Sig Dispense Refill    cetirizine (ZYRTEC) 10 MG tablet TAKE ONE TABLET BY MOUTH DAILY 30 tablet 5 montelukast (SINGULAIR) 10 MG tablet TAKE ONE TABLET BY MOUTH EVERY MORNING 30 tablet 5    DULERA 200-5 MCG/ACT inhaler INHALE TWO PUFFS BY MOUTH EVERY MORNING AND INHALE TWO PUFFS BY MOUTH EVERY NIGHT AT BEDTIME . RINSE MOUTH AFTER USING 13 g 5    levalbuterol (XOPENEX HFA) 45 MCG/ACT inhaler Inhale 1 puff into the lungs every 4 hours as needed for Wheezing 1 each 3    omeprazole (PRILOSEC) 40 MG delayed release capsule Take 1 capsule by mouth every morning (before breakfast) 30 capsule 0    DULERA 100-5 MCG/ACT inhaler INHALE TWO PUFFS BY MOUTH TWICE A DAY 13 g 5    pravastatin (PRAVACHOL) 10 MG tablet Take 1 tablet by mouth daily 90 tablet 3    gabapentin (NEURONTIN) 300 MG capsule       methocarbamol (ROBAXIN) 750 MG tablet       benzonatate (TESSALON) 200 MG capsule Take 1 capsule by mouth 3 times daily as needed for Cough 90 capsule 1     No current facility-administered medications for this visit.        SOCIAL AND OCCUPATIONAL HEALTH:  Social History     Tobacco Use   Smoking Status Former    Packs/day: 2.00    Years: 22.00    Pack years: 44.00    Types: Cigarettes    Quit date: 10/17/2015    Years since quittin.2   Smokeless Tobacco Never     TB :  Pets   Industrial exposure:  Birds :    SURGICAL HISTORY:   Past Surgical History:   Procedure Laterality Date    ABDOMEN SURGERY      BRONCHOSCOPY  15     SECTION      x 2    COLONOSCOPY  2013    normal per pt    CYST REMOVAL      DENTAL SURGERY      teeth removed    ENDOSCOPY, COLON, DIAGNOSTIC      ESOPHAGEAL DILATATION      GALLBLADDER SURGERY      GASTRIC FUNDOPLICATION  3/4701    HYSTEROSCOPY  09/10/2015    Hysteroscopy, dilation and curettage with the Luca Mcintosh    OTHER SURGICAL HISTORY  9/10/15    HYSTEROSCOPY, DILATATION AND CURETTAGE WITH MYOSURE, NOVASURE    SHOULDER SURGERY      right    THROAT SURGERY Right 2015    vocal cord stripping    TUBAL LIGATION      UPPER GASTROINTESTINAL ENDOSCOPY  13    gastritis, hiatal hernia, hemorrhoids    UPPER GASTROINTESTINAL ENDOSCOPY      10/2015    UPPER GASTROINTESTINAL ENDOSCOPY  2016    URETHRA SURGERY         FAMILY HISTORY:   Lung cancer:  DVT or PE     REVIEW OF SYSTEMS:  Constitutional: General health is good . There has been no weight changes. No fevers, fatigue or weakness. Head: Patient denies any history of trauma, convulsive disorder or syncope. Skin:  Patient denies history of changes in pigmentation, eruptions or pruritus. No easy bruising or bleeding. EENT: no nasal congestion   Cardiovascular ,No chest pain ,No edema ,  Respiration:SOB: + ,LEYVA :+  Gastrointestinal:No GI bleed ,no melena  ,no hematemesis    Musculoskeletal: no joint pain ,no swelling  Neurological:no , syncope. Denies twitching, convulsions, loss of consciousness or memory. Endocrine:  . No history of goiter, exophthalmos or dryness of skin. The patient has no history of diabetes. Hematopoietic:  No history of bleeding disorders or easy bruising. Rheumatic:  No connective tissue disease history or polyarthritis/inflammatory joint disease. PHYSICAL EXAMINATION:  There were no vitals filed for this visit. =    DATA:   1. Spirometry: The FEV1 is 2.15 liters, which is 70% of predicted. Forced vital capacity is 2.73 liters or 72% of predicted. The FEV1/FVC  ratio is normal.  Inhaled bronchodilators are given. There is no  significant improvement. 2.  Lung volumes: Total lung capacity is 3.92 liters or 72% of  predicted. 3.  Diffusion capacity:  DLCO is 18.61 mL/min/mmHg, which is 73% of  predicted. 4.  Flow volume loop is relatively normal.  5.  Six-minute walk test per [facility] protocol. Baseline oxygen  saturation 96%. Lowest oxygen saturation 95%. The patient ambulated  1260 feet. IMPRESSION:  1. No obstructive lung defect. 2.  Mild restrictive lung defect, which maybe partially explained by the  patient's obesity. 3.  Mild reduction in diffusion capacity.   No significant oxyhemoglobin  desaturation on six-minute walk testing. Roscoe Chaidez MD  IMPRESSION:    1-Asthma   2-ARJUN   3-Right diaphragm dysfunction: possibly related to lizeth procedurerECENT covid   4-RECENT COVID                 PLAN:      -keep machine   -on  Dulera  -xopenex as needed 3-4 months  -Sniff test to reassess diaphragm   -keep CPAP  - was  on dupoixent however she developed ,developed red sore and then blisster and stopped   Flu and Pneumovax as per primary  Send info to [facility] to see if anything can be done   On PPI as per primary  Martín Lair was evaluated through a synchronous (real-time) audio-video encounter. The patient (or guardian if applicable) is aware that this is a billable service, which includes applicable co-pays. This Virtual Visit was conducted with patient's (and/or legal guardian's) consent. The visit was conducted pursuant to the emergency declaration under the S    Thank you for allowing me to participate in [facility] care. I will keep following with you ,should you have any concerns ,please contact us at [facility] pulmonary office     Sincerely,        Efrain Green MD  Pulmonary & Critical Care Medicine     NOTE: This report was transcribed using voice recognition software. Every effort was made to ensure accuracy; however, inadvertent computerized transcription errors may be present.

## 2023-01-21 ENCOUNTER — OFFICE VISIT (OUTPATIENT)
Dept: URGENT CARE | Age: 43
End: 2023-01-21

## 2023-01-21 VITALS
WEIGHT: 293 LBS | OXYGEN SATURATION: 97 % | BODY MASS INDEX: 48.82 KG/M2 | SYSTOLIC BLOOD PRESSURE: 122 MMHG | RESPIRATION RATE: 14 BRPM | HEART RATE: 85 BPM | DIASTOLIC BLOOD PRESSURE: 82 MMHG | TEMPERATURE: 97.7 F | HEIGHT: 65 IN

## 2023-01-21 DIAGNOSIS — R03.0 ELEVATED BP WITHOUT DIAGNOSIS OF HYPERTENSION: ICD-10-CM

## 2023-01-21 DIAGNOSIS — R35.0 URINARY FREQUENCY: Primary | ICD-10-CM

## 2023-01-21 LAB
BILIRUBIN, POC: ABNORMAL
BLOOD URINE, POC: ABNORMAL
CLARITY, POC: CLEAR
COLOR, POC: YELLOW
GLUCOSE URINE, POC: ABNORMAL
KETONES, POC: ABNORMAL
LEUKOCYTE EST, POC: ABNORMAL
NITRITE, POC: ABNORMAL
PH, POC: 5.5
PROTEIN, POC: ABNORMAL
SPECIFIC GRAVITY, POC: 1.03
UROBILINOGEN, POC: 0.2

## 2023-01-21 ASSESSMENT — ENCOUNTER SYMPTOMS
SORE THROAT: 0
COUGH: 0
SHORTNESS OF BREATH: 0

## 2023-01-21 NOTE — PATIENT INSTRUCTIONS
UA in clinic today shows moderate blood, no bacteria  Urology referral due to chronic urinary symptoms   Monitor BP at home and call PCP if persistently elevated. ER if symptoms persist or if symptoms worsen. Concern for possible kidney stone.

## 2023-01-21 NOTE — PROGRESS NOTES
Brown Comer (:  1980) is a 36 y.o. female,New patient, here for evaluation of the following chief complaint(s):  Incontinence (Urinary retention)      ASSESSMENT/PLAN:    ICD-10-CM    1. Urinary frequency  R35.0 POCT Urinalysis no Micro      2. Elevated BP without diagnosis of hypertension  R03.0           UA in clinic today shows moderate blood, no bacteria. Urology referral due to chronic urinary symptoms   Monitor BP at home and call PCP if persistently elevated. ER if symptoms persist or if symptoms worsen. Concern for kidney stone. SUBJECTIVE/OBJECTIVE:    History provided by:  Patient   used: No    HPI:   36 y.o. female presents with symptoms of chronic increased urinary frequency/urgency that has been worsening over the past 2 weeks and left lower pelvic pain/pressure ongoing since 2023. Has chronic urinary incontinence that seems to be worse lately. Denies fever and flank pain. Denies history of frequent UTI but had urethral dilation 4-5 years ago. Has not taken any medications for symptoms. Vitals:    23 0858   BP: 122/82   Site: Left Upper Arm   Position: Sitting   Cuff Size: Large Adult   Pulse: 85   Resp: 14   Temp: 97.7 °F (36.5 °C)   SpO2: 97%   Weight: (!) 303 lb (137.4 kg)   Height: 5' 5\" (1.651 m)       Review of Systems   Constitutional:  Negative for fever. HENT:  Positive for congestion (chronic). Negative for ear pain and sore throat. Respiratory:  Negative for cough and shortness of breath. Genitourinary:  Positive for decreased urine volume, difficulty urinating, dysuria, frequency, pelvic pain (left lower) and urgency. Negative for flank pain, hematuria, vaginal bleeding, vaginal discharge and vaginal pain. All other systems reviewed and are negative. Physical Exam  Vitals reviewed. Constitutional:       Appearance: Normal appearance. HENT:      Head: Normocephalic.    Eyes:      Pupils: Pupils are equal, round, and reactive to light. Cardiovascular:      Rate and Rhythm: Normal rate and regular rhythm. Heart sounds: Normal heart sounds. Pulmonary:      Effort: Pulmonary effort is normal.      Breath sounds: Normal breath sounds. Abdominal:      General: Abdomen is flat. Bowel sounds are normal.      Palpations: Abdomen is soft. There is no splenomegaly or mass. Tenderness: There is abdominal tenderness in the left lower quadrant. There is left CVA tenderness. There is no guarding or rebound. Negative signs include Casas's sign, Rovsing's sign and McBurney's sign. Neurological:      Mental Status: She is alert. An electronic signature was used to authenticate this note.     --Trip Pascal, APRN - CNP

## 2023-03-07 ENCOUNTER — OFFICE VISIT (OUTPATIENT)
Dept: URGENT CARE | Age: 43
End: 2023-03-07

## 2023-03-07 VITALS
HEIGHT: 65 IN | WEIGHT: 293 LBS | HEART RATE: 106 BPM | BODY MASS INDEX: 48.82 KG/M2 | OXYGEN SATURATION: 95 % | SYSTOLIC BLOOD PRESSURE: 136 MMHG | DIASTOLIC BLOOD PRESSURE: 80 MMHG | TEMPERATURE: 100 F

## 2023-03-07 DIAGNOSIS — H66.002 NON-RECURRENT ACUTE SUPPURATIVE OTITIS MEDIA OF LEFT EAR WITHOUT SPONTANEOUS RUPTURE OF TYMPANIC MEMBRANE: ICD-10-CM

## 2023-03-07 DIAGNOSIS — U07.1 COVID-19: Primary | ICD-10-CM

## 2023-03-07 DIAGNOSIS — R50.9 FEVER, UNSPECIFIED FEVER CAUSE: ICD-10-CM

## 2023-03-07 LAB
INFLUENZA VIRUS A RNA: NORMAL
INFLUENZA VIRUS B RNA: NORMAL
Lab: ABNORMAL
PERFORMING INSTRUMENT: ABNORMAL
QC PASS/FAIL: ABNORMAL
SARS-COV-2, POC: DETECTED

## 2023-03-07 RX ORDER — AZITHROMYCIN 250 MG/1
250 TABLET, FILM COATED ORAL SEE ADMIN INSTRUCTIONS
Qty: 6 TABLET | Refills: 0 | Status: SHIPPED | OUTPATIENT
Start: 2023-03-07 | End: 2023-03-12

## 2023-03-07 ASSESSMENT — ENCOUNTER SYMPTOMS
RHINORRHEA: 1
COUGH: 1
VOMITING: 0
ABDOMINAL PAIN: 0
DIARRHEA: 0
NAUSEA: 1
SHORTNESS OF BREATH: 0

## 2023-03-07 NOTE — PATIENT INSTRUCTIONS
Paxlovid as directed  Azithromycin as directed  Get plenty of rest, drink lots of fluid   Continue OTC medications as needed  Follow up if symptoms do not improve or worsen

## 2023-03-07 NOTE — PROGRESS NOTES
Gustabo Cotton (:  1980) is a 36 y.o. female,New patient, here for evaluation of the following chief complaint(s):  Generalized Body Aches (X 2 days), Fever, and Cough      ASSESSMENT/PLAN:  1. COVID-19  Rapid Covid positive  Paxlovid as prescribed  Get plenty of rest, drink lots of fluid   Continue OTC medications as needed  Follow up if symptoms do not improve or worsen      2. Non-recurrent acute suppurative otitis media of left ear without spontaneous rupture of tympanic membrane  Azithromycin as directed    3. Fever, unspecified fever cause  Results for POC orders placed in visit on 23   POCT Influenza A/B DNA (Alere i)   Result Value Ref Range    Influenza virus A RNA NEG     Influenza virus B RNA NEG        - POCT Influenza A/B DNA (Alere i)  - POCT COVID-19, Antigen     Return if symptoms worsen or fail to improve. SUBJECTIVE/OBJECTIVE:  Patient comes in today for viral sxs for two days, states getting worse. Babysat nephew who had similar sxs. Also lives in home with multiple teenager, all who have had URI sxs. Tylenol not helping at home. History provided by:  Patient   used: No    Generalized Body Aches  Severity:  Moderate  Onset quality:  Sudden  Duration:  2 days  Timing:  Constant  Progression:  Worsening  Associated symptoms: cough, fatigue, fever, headaches, myalgias, nausea and rhinorrhea    Associated symptoms: no abdominal pain, no diarrhea, no shortness of breath and no vomiting    Fever   Associated symptoms include coughing, headaches and nausea. Pertinent negatives include no abdominal pain, diarrhea or vomiting. Cough  Associated symptoms include a fever, headaches, myalgias and rhinorrhea. Pertinent negatives include no shortness of breath.      Vitals:    23 1057   BP: 136/80   Site: Right Upper Arm   Position: Sitting   Cuff Size: Large Adult   Pulse: (!) 106   Temp: 100 °F (37.8 °C)   TempSrc: Oral   SpO2: 95%   Weight: (!) 308 lb (139.7 kg)   Height: 5' 5\" (1.651 m)       Review of Systems   Constitutional:  Positive for fatigue and fever. HENT:  Positive for rhinorrhea. Respiratory:  Positive for cough. Negative for shortness of breath. Gastrointestinal:  Positive for nausea. Negative for abdominal pain, diarrhea and vomiting. Musculoskeletal:  Positive for myalgias. Neurological:  Positive for headaches. Physical Exam  Vitals reviewed. Constitutional:       General: She is not in acute distress. Appearance: She is ill-appearing. She is not toxic-appearing. HENT:      Head: Normocephalic and atraumatic. Right Ear: Tympanic membrane, ear canal and external ear normal.      Left Ear: Ear canal and external ear normal. A middle ear effusion (purulent) is present. Tympanic membrane is injected and erythematous. Nose: Rhinorrhea present. Rhinorrhea is clear. Mouth/Throat:      Mouth: Mucous membranes are moist.      Pharynx: Oropharynx is clear. Uvula midline. No pharyngeal swelling, oropharyngeal exudate, posterior oropharyngeal erythema or uvula swelling. Tonsils: No tonsillar exudate or tonsillar abscesses. 0 on the right. 0 on the left. Cardiovascular:      Rate and Rhythm: Regular rhythm. Tachycardia present. Pulses: Normal pulses. Heart sounds: Normal heart sounds. No murmur heard. No friction rub. No gallop. Pulmonary:      Effort: Pulmonary effort is normal.      Breath sounds: Normal breath sounds. Skin:     General: Skin is warm and dry. Neurological:      Mental Status: She is alert and oriented to person, place, and time. An electronic signature was used to authenticate this note.     --Sky Burch, MACHO - CNP

## 2023-03-25 ENCOUNTER — OFFICE VISIT (OUTPATIENT)
Dept: URGENT CARE | Age: 43
End: 2023-03-25

## 2023-03-25 VITALS
OXYGEN SATURATION: 95 % | TEMPERATURE: 98.1 F | HEART RATE: 101 BPM | WEIGHT: 293 LBS | HEIGHT: 66 IN | SYSTOLIC BLOOD PRESSURE: 116 MMHG | BODY MASS INDEX: 47.09 KG/M2 | DIASTOLIC BLOOD PRESSURE: 73 MMHG

## 2023-03-25 DIAGNOSIS — J20.9 ACUTE BRONCHITIS, UNSPECIFIED ORGANISM: Primary | ICD-10-CM

## 2023-03-25 DIAGNOSIS — J20.9 ACUTE BRONCHITIS, UNSPECIFIED ORGANISM: ICD-10-CM

## 2023-03-25 DIAGNOSIS — R06.89 ABNORMAL BREATH SOUNDS: ICD-10-CM

## 2023-03-25 DIAGNOSIS — J45.21 MILD INTERMITTENT ASTHMA WITH ACUTE EXACERBATION: Primary | ICD-10-CM

## 2023-03-25 RX ORDER — LEVALBUTEROL TARTRATE 45 UG/1
1-2 AEROSOL, METERED ORAL EVERY 4 HOURS PRN
Qty: 1 EACH | Refills: 0 | Status: SHIPPED | OUTPATIENT
Start: 2023-03-25 | End: 2023-04-24

## 2023-03-25 RX ORDER — PREDNISONE 20 MG/1
TABLET ORAL
Qty: 10 TABLET | Refills: 0 | Status: SHIPPED | OUTPATIENT
Start: 2023-03-25

## 2023-03-25 RX ORDER — IPRATROPIUM BROMIDE AND ALBUTEROL SULFATE 2.5; .5 MG/3ML; MG/3ML
1 SOLUTION RESPIRATORY (INHALATION) EVERY 4 HOURS PRN
Qty: 30 EACH | Refills: 0 | Status: SHIPPED | OUTPATIENT
Start: 2023-03-25 | End: 2023-04-24

## 2023-03-25 RX ORDER — GUAIFENESIN AND DEXTROMETHORPHAN HYDROBROMIDE 600; 30 MG/1; MG/1
1 TABLET, EXTENDED RELEASE ORAL EVERY 12 HOURS PRN
Qty: 14 TABLET | Refills: 0 | COMMUNITY
Start: 2023-03-25 | End: 2023-03-25

## 2023-03-25 RX ORDER — GUAIFENESIN AND DEXTROMETHORPHAN HYDROBROMIDE 600; 30 MG/1; MG/1
1 TABLET, EXTENDED RELEASE ORAL EVERY 12 HOURS PRN
Qty: 14 TABLET | Refills: 0 | Status: SHIPPED | OUTPATIENT
Start: 2023-03-25 | End: 2023-04-01

## 2023-03-25 ASSESSMENT — ENCOUNTER SYMPTOMS
DIARRHEA: 0
ANAL BLEEDING: 0
EYES NEGATIVE: 1
SINUS PAIN: 0
ABDOMINAL PAIN: 0
CHEST TIGHTNESS: 1
WHEEZING: 1
SORE THROAT: 0
VOMITING: 0
RECTAL PAIN: 0
ABDOMINAL DISTENTION: 1
BLOOD IN STOOL: 0
SINUS PRESSURE: 1
SHORTNESS OF BREATH: 1
COUGH: 1
CONSTIPATION: 0
NAUSEA: 0

## 2023-03-25 NOTE — PROGRESS NOTES
Edu Brooke (:  1980) is a 36 y.o. female,New patient, here for evaluation of the following chief complaint(s):  Cough (Came a few weeks ago and was dx w/ covid. Got better. Has always had sinus congestion. Traveled and when she came home she had pain and wheezing also edema in lower extremities. Yesterday couldn't quit coughing, at night she was still coughing a lot, labored breathing, hard to breathe. wheezing)      ASSESSMENT/PLAN:    ICD-10-CM    1. Mild intermittent asthma with acute exacerbation  J45.21 predniSONE (DELTASONE) 20 MG tablet     ipratropium-albuterol (DUONEB) 0.5-2.5 (3) MG/3ML SOLN nebulizer solution     levalbuterol (XOPENEX HFA) 45 MCG/ACT inhaler     Dextromethorphan-guaiFENesin (MUCINEX DM)  MG TB12      2. Abnormal breath sounds  R06.89 XR CHEST STANDARD (2 VW)      3. Acute bronchitis, unspecified organism  J20.9       Reviewed xray results with patient, negative for pneumonia  Bronchial wall thickening, which may be seen in inflammatory conditions such   as bronchitis, reactive airways disease, pulmonary vascular congestion, and   smoking. No acute infiltrate. Follow up with PCP if LE edema continues  Return if symptoms worsen or fail to improve. SUBJECTIVE/OBJECTIVE:  36year old female presents with c/o wheezing and SOB at night with cough for 5 days. She has h/o asthma and has been using her inhaler  - last used inhaler 24 hours ago with mild effectiveness. She is getting mild LE edema. Her cough is non-productive. She has treated with tylenol and ibuprofen for her HA. Denies fever chills or body aches. History provided by:  Patient   used: No      Vitals:    23 1334   BP: 116/73   Pulse: (!) 101   Temp: 98.1 °F (36.7 °C)   SpO2: 95%   Weight: (!) 309 lb 12.8 oz (140.5 kg)   Height: 5' 5.5\" (1.664 m)       Review of Systems   Constitutional:  Positive for appetite change and fatigue. Negative for fever.    HENT:

## 2023-03-27 ENCOUNTER — TELEPHONE (OUTPATIENT)
Dept: PULMONOLOGY | Age: 43
End: 2023-03-27

## 2023-04-03 RX ORDER — MOMETASONE FUROATE AND FORMOTEROL FUMARATE DIHYDRATE 200; 5 UG/1; UG/1
AEROSOL RESPIRATORY (INHALATION)
Qty: 13 G | Refills: 5 | Status: SHIPPED | OUTPATIENT
Start: 2023-04-03

## 2023-06-21 RX ORDER — MONTELUKAST SODIUM 10 MG/1
TABLET ORAL
Qty: 30 TABLET | Refills: 5 | Status: SHIPPED | OUTPATIENT
Start: 2023-06-21

## 2023-06-21 RX ORDER — CETIRIZINE HYDROCHLORIDE 10 MG/1
TABLET ORAL
Qty: 30 TABLET | Refills: 5 | Status: SHIPPED | OUTPATIENT
Start: 2023-06-21

## 2023-06-26 RX ORDER — CETIRIZINE HYDROCHLORIDE 10 MG/1
TABLET ORAL
Qty: 30 TABLET | Refills: 5 | OUTPATIENT
Start: 2023-06-26

## 2023-10-24 RX ORDER — MOMETASONE FUROATE AND FORMOTEROL FUMARATE DIHYDRATE 200; 5 UG/1; UG/1
AEROSOL RESPIRATORY (INHALATION)
Qty: 39 G | Refills: 0 | Status: SHIPPED | OUTPATIENT
Start: 2023-10-24

## 2023-12-31 ENCOUNTER — OFFICE VISIT (OUTPATIENT)
Dept: URGENT CARE | Age: 43
End: 2023-12-31

## 2023-12-31 VITALS
OXYGEN SATURATION: 96 % | BODY MASS INDEX: 48.45 KG/M2 | SYSTOLIC BLOOD PRESSURE: 110 MMHG | TEMPERATURE: 97.9 F | HEART RATE: 90 BPM | WEIGHT: 290.8 LBS | DIASTOLIC BLOOD PRESSURE: 75 MMHG | HEIGHT: 65 IN

## 2023-12-31 DIAGNOSIS — N30.00 ACUTE CYSTITIS WITHOUT HEMATURIA: Primary | ICD-10-CM

## 2023-12-31 DIAGNOSIS — R35.0 URINARY FREQUENCY: ICD-10-CM

## 2023-12-31 LAB
APPEARANCE FLUID: CLEAR
BILIRUBIN, POC: NEGATIVE
BLOOD URINE, POC: NORMAL
CLARITY, POC: NORMAL
COLOR, POC: YELLOW
GLUCOSE URINE, POC: NEGATIVE
KETONES, POC: NEGATIVE
LEUKOCYTE EST, POC: NEGATIVE
NITRITE, POC: NEGATIVE
PH, POC: 5.5
PROTEIN, POC: NEGATIVE
SPECIFIC GRAVITY, POC: >=1.03
UROBILINOGEN, POC: NORMAL

## 2023-12-31 RX ORDER — NITROFURANTOIN 25; 75 MG/1; MG/1
100 CAPSULE ORAL 2 TIMES DAILY
Qty: 10 CAPSULE | Refills: 0 | Status: SHIPPED | OUTPATIENT
Start: 2023-12-31 | End: 2024-01-05

## 2023-12-31 RX ORDER — CEFTRIAXONE 1 G/1
1000 INJECTION, POWDER, FOR SOLUTION INTRAMUSCULAR; INTRAVENOUS ONCE
Status: COMPLETED | OUTPATIENT
Start: 2023-12-31 | End: 2023-12-31

## 2023-12-31 RX ADMIN — CEFTRIAXONE 1000 MG: 1 INJECTION, POWDER, FOR SOLUTION INTRAMUSCULAR; INTRAVENOUS at 18:52

## 2024-01-02 LAB — BACTERIA UR CULT: NORMAL

## 2024-01-09 ENCOUNTER — OFFICE VISIT (OUTPATIENT)
Dept: URGENT CARE | Age: 44
End: 2024-01-09

## 2024-01-09 VITALS
OXYGEN SATURATION: 95 % | TEMPERATURE: 97.8 F | HEIGHT: 65 IN | HEART RATE: 91 BPM | WEIGHT: 289 LBS | BODY MASS INDEX: 48.15 KG/M2 | SYSTOLIC BLOOD PRESSURE: 115 MMHG | DIASTOLIC BLOOD PRESSURE: 77 MMHG

## 2024-01-09 DIAGNOSIS — J45.20 MILD INTERMITTENT ASTHMA, UNSPECIFIED WHETHER COMPLICATED: ICD-10-CM

## 2024-01-09 DIAGNOSIS — J06.9 VIRAL URI: Primary | ICD-10-CM

## 2024-01-09 DIAGNOSIS — J40 BRONCHITIS: ICD-10-CM

## 2024-01-09 RX ORDER — METHYLPREDNISOLONE 4 MG/1
TABLET ORAL
Qty: 1 KIT | Refills: 0 | Status: SHIPPED | OUTPATIENT
Start: 2024-01-09 | End: 2024-01-15

## 2024-01-09 RX ORDER — BENZONATATE 200 MG/1
200 CAPSULE ORAL 3 TIMES DAILY PRN
Qty: 21 CAPSULE | Refills: 0 | Status: SHIPPED | OUTPATIENT
Start: 2024-01-09 | End: 2024-01-16

## 2024-01-09 RX ORDER — LEVALBUTEROL TARTRATE 45 UG/1
1 AEROSOL, METERED ORAL EVERY 4 HOURS PRN
Qty: 1 EACH | Refills: 1 | Status: SHIPPED | OUTPATIENT
Start: 2024-01-09 | End: 2025-01-08

## 2024-01-09 ASSESSMENT — ENCOUNTER SYMPTOMS
SINUS PAIN: 0
COUGH: 1
NAUSEA: 0
DIARRHEA: 0
SORE THROAT: 1
EYE PAIN: 0
ABDOMINAL PAIN: 0
SHORTNESS OF BREATH: 1
EYE DISCHARGE: 0
EYE REDNESS: 0

## 2024-01-09 NOTE — PROGRESS NOTES
Pauline Cordero (: 1980) is a 43 y.o. female, Established patient, here for evaluation of the following chief complaint(s):  Congestion (Pt states congestion in her chest into her back. Started last night. Pt states she has Asthma but last night her cough was different and she said she smelt gas and started coughing. She also states that she has something in her chest she's trying to cough out but its stuck and she started coughing up some red this morning. ), Cough, and Generalized Body Aches      ASSESSMENT/PLAN:    ICD-10-CM    1. Viral URI  J06.9       2. Bronchitis  J40 methylPREDNISolone (MEDROL DOSEPACK) 4 MG tablet     benzonatate (TESSALON) 200 MG capsule     levalbuterol (XOPENEX HFA) 45 MCG/ACT inhaler      3. Mild intermittent asthma, unspecified whether complicated  J45.20 methylPREDNISolone (MEDROL DOSEPACK) 4 MG tablet     levalbuterol (XOPENEX HFA) 45 MCG/ACT inhaler            - Pt did not want any testing done. Low concern for respiratory distress, strep pharyngitis, otitis media, bacterial pneumonia, sinusitis or sepsis.  - Pt to drink lots of fluids  - Pt to take medication as prescribed  - Pt ok to take tylenol as needed  - Pt to call if any symptoms worsen or follow up with PCP  - Pt to go to ER if have shortness of breath or chest pain  - Pt to isolate until fever free for 24 hours without fever reducers    Follow up with your PCP or return to the clinic in 5 days if symptoms persist or if symptoms worsen.    Reviewed AVS with treatment instructions and answered questions - pt expresses understanding and agreement with the discussed treatment plan and AVS instructions.      SUBJECTIVE/OBJECTIVE:  HPI:   43 y.o. female presents for complaint of 2 days ago she started with nasal congestion and cough.    Also admits symptoms of body aches, chills, headache and sore throat.  Denies symptoms of fever, ear pain, abdominal pain, vomiting or diarrhea.     Has taken mucinex at home. No known

## 2024-01-10 ENCOUNTER — TELEPHONE (OUTPATIENT)
Dept: PULMONOLOGY | Age: 44
End: 2024-01-10

## 2024-01-10 RX ORDER — NIRMATRELVIR AND RITONAVIR 150-100 MG
KIT ORAL
Qty: 20 TABLET | Refills: 0 | Status: SHIPPED | OUTPATIENT
Start: 2024-01-10 | End: 2024-01-15

## 2024-01-10 NOTE — TELEPHONE ENCOUNTER
Do you have the following symptoms?  Shortness of Breath  yes  Wheezing  yes  Cough  yes  Cough Characteristics:  Productive    once in awhile  Sputum Color    no color  Hemoptysis   some red blood yesterday when she coughed today no blood  Consistency of sputum   thick   not really producing anything   Fever:    no    Temp:no  Chills/Sweats:  no  What other symptoms are you having?:  feels like she is going to pass out when she coughs Pt did test positive for covid today    How long have you had these symptoms?   3 days     Pharmacy: [facility] [address]          Review medications and allergies:        Allergies?  y        Currently on Antibiotics?  (Drug/Dose/Frequency and how long on?) no        Systemic Steroids?  (Drug/Dose/Frequency and how long on?) yes prednisone       Last OV 01/18/23 with Dr. Blanca    (pull in last visit note assessment/plan)     IMPRESSION:    1-Asthma   2-ARJUN   3-Right diaphragm dysfunction: possibly related to lizeth procedurerECENT covid   4-RECENT COVID                  PLAN:      -keep machine   -on  Dulera  -xopenex as needed 3-4 months  -Sniff test to reassess diaphragm   -keep CPAP  - was  on dupoixent however she developed ,developed red sore and then blisster and stopped   Flu and Pneumovax as per primary  Send info to [facility] to see if anything can be done   On PPI as per primary  Pauline Cordero was evaluated through a synchronous (real-time) audio-video encounter. The patient (or guardian if applicable) is aware that this is a billable service, which includes applicable co-pays. This Virtual Visit was conducted with patient's (and/or legal guardian's) consent. The visit was conducted pursuant to the emergency declaration under the S

## 2024-01-24 ENCOUNTER — TELEPHONE (OUTPATIENT)
Dept: PULMONOLOGY | Age: 44
End: 2024-01-24

## 2024-01-24 DIAGNOSIS — J45.909 UNCOMPLICATED ASTHMA, UNSPECIFIED ASTHMA SEVERITY, UNSPECIFIED WHETHER PERSISTENT: ICD-10-CM

## 2024-01-24 DIAGNOSIS — J44.9 CHRONIC OBSTRUCTIVE PULMONARY DISEASE, UNSPECIFIED COPD TYPE (HCC): Primary | ICD-10-CM

## 2024-01-24 RX ORDER — AZITHROMYCIN 500 MG/1
500 TABLET, FILM COATED ORAL DAILY
Qty: 5 TABLET | Refills: 0 | Status: SHIPPED | OUTPATIENT
Start: 2024-01-24 | End: 2024-01-29

## 2024-01-24 RX ORDER — PREDNISONE 20 MG/1
20 TABLET ORAL 2 TIMES DAILY
Qty: 10 TABLET | Refills: 0 | Status: SHIPPED | OUTPATIENT
Start: 2024-01-24 | End: 2024-01-29

## 2024-01-24 NOTE — TELEPHONE ENCOUNTER
Pt would like appt today    Do you have the following symptoms?  Shortness of Breath  yes  Wheezing  yes  Cough  yes  Cough Characteristics:  Productive    no but cough feels wet  Sputum Color    n/a  Hemoptysis   no  Consistency of sputum   n/a     Fever:    unsure    Temp:n/a  Chills/Sweats:  yes both  What other symptoms are you having?:  Feels like air is trapped in chest w/ cough and chest pain. +covid 2 weeks ago. Paxlovid gave neuro issues did not take last dose. Pt was given prednisone at urgent care.     How long have you had these symptoms?   1 week     Pharmacy: [place], [place]          Review medications and allergies:        Allergies?  Dupixent [Dupilumab]  OtherDermatitis  PenicillinsSwelling  MorphineOther (See Comments)  Morphine And RelatedOther (See Comments)  Oxycodone-acetaminophenRash  Percocet [Oxycodone-acetaminophen]Rash  Sulfa AntibioticsRash        Currently on Antibiotics?  (Drug/Dose/Frequency and how long on?) no         Systemic Steroids?  (Drug/Dose/Frequency and how long on?) finished      Last sick call taken on 1-10-24.  Meds prescribed were paxlovid, by Dr. Burgos.    Last OV 1-18-23 with Dr. Burgos   (pull in last visit note assessment/plan)   IMPRESSION:    1-Asthma   2-ARJUN   3-Right diaphragm dysfunction: possibly related to lizeth procedurerECENT covid   4-RECENT COVID                  PLAN:      -keep machine   -on  Dulera  -xopenex as needed 3-4 months  -Sniff test to reassess diaphragm   -keep CPAP  - was  on dupoixent however she developed ,developed red sore and then blisster and stopped   Flu and Pneumovax as per primary  Send info to [facility] to see if anything can be done   On PPI as per primary  Pauline CARRENO Gil was evaluated through a synchronous (real-time) audio-video encounter. The patient (or guardian if applicable) is aware that this is a billable service, which includes applicable co-pays. This Virtual Visit was conducted with patient's (and/or legal

## 2024-01-26 ENCOUNTER — APPOINTMENT (OUTPATIENT)
Dept: GENERAL RADIOLOGY | Age: 44
End: 2024-01-26
Payer: COMMERCIAL

## 2024-01-26 ENCOUNTER — HOSPITAL ENCOUNTER (EMERGENCY)
Age: 44
Discharge: HOME | End: 2024-01-26
Attending: STUDENT IN AN ORGANIZED HEALTH CARE EDUCATION/TRAINING PROGRAM
Payer: COMMERCIAL

## 2024-01-26 VITALS
SYSTOLIC BLOOD PRESSURE: 105 MMHG | DIASTOLIC BLOOD PRESSURE: 69 MMHG | RESPIRATION RATE: 18 BRPM | TEMPERATURE: 97.4 F | HEART RATE: 98 BPM | OXYGEN SATURATION: 96 %

## 2024-01-26 DIAGNOSIS — J40 BRONCHITIS: Primary | ICD-10-CM

## 2024-01-26 LAB
ANION GAP SERPL CALCULATED.3IONS-SCNC: 9 MMOL/L (ref 3–16)
BASE EXCESS BLDV CALC-SCNC: 0.5 MMOL/L (ref -3–3)
BASOPHILS # BLD: 0 K/UL (ref 0–0.2)
BASOPHILS NFR BLD: 0.4 %
BUN SERPL-MCNC: 11 MG/DL (ref 7–20)
CALCIUM SERPL-MCNC: 8.5 MG/DL (ref 8.3–10.6)
CHLORIDE SERPL-SCNC: 105 MMOL/L (ref 99–110)
CO2 BLDV-SCNC: 28 MMOL/L
CO2 SERPL-SCNC: 27 MMOL/L (ref 21–32)
COHGB MFR BLDV: 1.1 % (ref 0–1.5)
CREAT SERPL-MCNC: 0.6 MG/DL (ref 0.6–1.1)
DEPRECATED RDW RBC AUTO: 16.6 % (ref 12.4–15.4)
EKG ATRIAL RATE: 102 BPM
EKG DIAGNOSIS: NORMAL
EKG P AXIS: 59 DEGREES
EKG P-R INTERVAL: 144 MS
EKG Q-T INTERVAL: 334 MS
EKG QRS DURATION: 82 MS
EKG QTC CALCULATION (BAZETT): 435 MS
EKG R AXIS: -13 DEGREES
EKG T AXIS: 25 DEGREES
EKG VENTRICULAR RATE: 102 BPM
EOSINOPHIL # BLD: 0.1 K/UL (ref 0–0.6)
EOSINOPHIL NFR BLD: 1.4 %
GFR SERPLBLD CREATININE-BSD FMLA CKD-EPI: >60 ML/MIN/{1.73_M2}
GLUCOSE SERPL-MCNC: 159 MG/DL (ref 70–99)
HCO3 BLDV-SCNC: 26.3 MMOL/L (ref 23–29)
HCT VFR BLD AUTO: 38.6 % (ref 36–48)
HGB BLD-MCNC: 12.7 G/DL (ref 12–16)
LACTATE BLDV-SCNC: 1.7 MMOL/L (ref 0.4–2)
LYMPHOCYTES # BLD: 1.4 K/UL (ref 1–5.1)
LYMPHOCYTES NFR BLD: 14.4 %
MAGNESIUM SERPL-MCNC: 1.8 MG/DL (ref 1.8–2.4)
MCH RBC QN AUTO: 25.3 PG (ref 26–34)
MCHC RBC AUTO-ENTMCNC: 32.9 G/DL (ref 31–36)
MCV RBC AUTO: 77 FL (ref 80–100)
METHGB MFR BLDV: 0 %
MONOCYTES # BLD: 0.8 K/UL (ref 0–1.3)
MONOCYTES NFR BLD: 8.5 %
NEUTROPHILS # BLD: 7.3 K/UL (ref 1.7–7.7)
NEUTROPHILS NFR BLD: 75.3 %
NT-PROBNP SERPL-MCNC: <36 PG/ML (ref 0–124)
O2 THERAPY: ABNORMAL
PCO2 BLDV: 46.9 MMHG (ref 40–50)
PH BLDV: 7.37 [PH] (ref 7.35–7.45)
PLATELET # BLD AUTO: 228 K/UL (ref 135–450)
PMV BLD AUTO: 8.6 FL (ref 5–10.5)
PO2 BLDV: 59.2 MMHG (ref 25–40)
POTASSIUM SERPL-SCNC: 3.5 MMOL/L (ref 3.5–5.1)
RBC # BLD AUTO: 5.01 M/UL (ref 4–5.2)
SAO2 % BLDV: 90 %
SODIUM SERPL-SCNC: 141 MMOL/L (ref 136–145)
TROPONIN, HIGH SENSITIVITY: 10 NG/L (ref 0–14)
TROPONIN, HIGH SENSITIVITY: 9 NG/L (ref 0–14)
WBC # BLD AUTO: 9.7 K/UL (ref 4–11)

## 2024-01-26 PROCEDURE — 83880 ASSAY OF NATRIURETIC PEPTIDE: CPT

## 2024-01-26 PROCEDURE — 84484 ASSAY OF TROPONIN QUANT: CPT

## 2024-01-26 PROCEDURE — 36415 COLL VENOUS BLD VENIPUNCTURE: CPT

## 2024-01-26 PROCEDURE — 71046 X-RAY EXAM CHEST 2 VIEWS: CPT

## 2024-01-26 PROCEDURE — 93010 ELECTROCARDIOGRAM REPORT: CPT | Performed by: INTERNAL MEDICINE

## 2024-01-26 PROCEDURE — 2580000003 HC RX 258: Performed by: STUDENT IN AN ORGANIZED HEALTH CARE EDUCATION/TRAINING PROGRAM

## 2024-01-26 PROCEDURE — 6370000000 HC RX 637 (ALT 250 FOR IP): Performed by: STUDENT IN AN ORGANIZED HEALTH CARE EDUCATION/TRAINING PROGRAM

## 2024-01-26 PROCEDURE — 6360000002 HC RX W HCPCS: Performed by: STUDENT IN AN ORGANIZED HEALTH CARE EDUCATION/TRAINING PROGRAM

## 2024-01-26 PROCEDURE — 99285 EMERGENCY DEPT VISIT HI MDM: CPT

## 2024-01-26 PROCEDURE — 85025 COMPLETE CBC W/AUTO DIFF WBC: CPT

## 2024-01-26 PROCEDURE — 83605 ASSAY OF LACTIC ACID: CPT

## 2024-01-26 PROCEDURE — 80048 BASIC METABOLIC PNL TOTAL CA: CPT

## 2024-01-26 PROCEDURE — 83735 ASSAY OF MAGNESIUM: CPT

## 2024-01-26 PROCEDURE — 96374 THER/PROPH/DIAG INJ IV PUSH: CPT

## 2024-01-26 PROCEDURE — 82803 BLOOD GASES ANY COMBINATION: CPT

## 2024-01-26 PROCEDURE — 93005 ELECTROCARDIOGRAM TRACING: CPT | Performed by: STUDENT IN AN ORGANIZED HEALTH CARE EDUCATION/TRAINING PROGRAM

## 2024-01-26 RX ORDER — KETOROLAC TROMETHAMINE 15 MG/ML
15 INJECTION, SOLUTION INTRAMUSCULAR; INTRAVENOUS ONCE
Status: COMPLETED | OUTPATIENT
Start: 2024-01-26 | End: 2024-01-26

## 2024-01-26 RX ORDER — CODEINE PHOSPHATE AND GUAIFENESIN 10; 100 MG/5ML; MG/5ML
5 SOLUTION ORAL ONCE
Status: COMPLETED | OUTPATIENT
Start: 2024-01-26 | End: 2024-01-26

## 2024-01-26 RX ORDER — CODEINE PHOSPHATE/GUAIFENESIN 10-100MG/5
5 LIQUID (ML) ORAL 2 TIMES DAILY PRN
Qty: 30 ML | Refills: 0 | Status: SHIPPED | OUTPATIENT
Start: 2024-01-26 | End: 2024-01-29

## 2024-01-26 RX ORDER — SODIUM CHLORIDE, SODIUM LACTATE, POTASSIUM CHLORIDE, AND CALCIUM CHLORIDE .6; .31; .03; .02 G/100ML; G/100ML; G/100ML; G/100ML
1000 INJECTION, SOLUTION INTRAVENOUS ONCE
Status: COMPLETED | OUTPATIENT
Start: 2024-01-26 | End: 2024-01-26

## 2024-01-26 RX ORDER — NEBULIZER ACCESSORIES
1 KIT MISCELLANEOUS DAILY PRN
Qty: 1 KIT | Refills: 0 | Status: SHIPPED | OUTPATIENT
Start: 2024-01-26

## 2024-01-26 RX ADMIN — Medication 5 ML: at 12:30

## 2024-01-26 RX ADMIN — KETOROLAC TROMETHAMINE 15 MG: 15 INJECTION, SOLUTION INTRAMUSCULAR; INTRAVENOUS at 12:31

## 2024-01-26 RX ADMIN — SODIUM CHLORIDE, POTASSIUM CHLORIDE, SODIUM LACTATE AND CALCIUM CHLORIDE 1000 ML: 600; 310; 30; 20 INJECTION, SOLUTION INTRAVENOUS at 12:32

## 2024-01-26 ASSESSMENT — PAIN - FUNCTIONAL ASSESSMENT: PAIN_FUNCTIONAL_ASSESSMENT: NONE - DENIES PAIN

## 2024-01-26 NOTE — DISCHARGE INSTRUCTIONS
You were evaluated in the emergency department for cough and shortness of breath. Assessments and testing completed during your visit were reassuring and at this time there is no indication for further testing, treatment or admission to the hospital. Given this it is appropriate to discharge you from the emergency department. At the time of discharge we discussed the following:    Please continue your antibiotics, steroids and now with the addition of the cough medicine to support your recovery and discuss further with your pulmonologist.    Please note that sometimes it is difficult to diagnose a medical condition early in the disease process before the disease is fully manifest. Because of this, should you develop any new or worsening symptoms, you may return at any time to the emergency department for another evaluation. If available you are also recommended to review this visit with your primary care physician or other medical provider in the next 7 days. Thank you for allowing us to care for you today.

## 2024-01-26 NOTE — ED PROVIDER NOTES
sounding cough and paroxysms.  Her breath sounds are overall clear no wheezing no evidence of obstructive process.  Remainder of exam is benign.  Overall her presentation likely represents a postviral bronchitis and she is certainly on appropriate therapy with her steroids and antibiotics in the context of her asthma.  We will initiate labs and imaging to better characterize her condition at the risk that she does have an occult pneumonia.    ED Course as of 01/26/24 1454   Fri Jan 26, 2024   1346 Initial workup was reviewed and is benign including labs and chest x-ray.  At this time I do favor a postviral bronchitis as the root cause of the patient's condition. [NG]   1453 Patient is reassessed she is resting comfortably symptomatically improved with medications given here.  Overall her presentation would suggest bronchitis without high risk features appropriate for routine outpatient care. [NG]      ED Course User Index  [NG] Jeffrey Duque MD       Additional Reassessment    Is this patient to be included in the SEP-1 core measure? No Exclusion criteria - the patient is NOT to be included for SEP-1 Core Measure due to: Infection is not suspected    Medical Decision Making  Presentation for cough and shortness of breath reassuring clinical course workup would suggest bronchitis appropriate for routine outpatient care.    Problems Addressed:  Bronchitis: acute illness or injury    Amount and/or Complexity of Data Reviewed  Labs: ordered. Decision-making details documented in ED Course.  Radiology: ordered. Decision-making details documented in ED Course.  ECG/medicine tests: ordered. Decision-making details documented in ED Course.    Risk  OTC drugs.  Prescription drug management.  Decision regarding hospitalization.          The patient was given the followingmedications:  Orders Placed This Encounter   Medications    lactated ringers bolus bolus 1,000 mL    guaiFENesin-codeine (guaiFENesin AC) 100-10 MG/5ML

## 2024-01-31 ENCOUNTER — HOSPITAL ENCOUNTER (OUTPATIENT)
Age: 44
Discharge: HOME | End: 2024-01-31
Payer: COMMERCIAL

## 2024-01-31 ENCOUNTER — OFFICE VISIT (OUTPATIENT)
Dept: PULMONOLOGY | Age: 44
End: 2024-01-31
Payer: COMMERCIAL

## 2024-01-31 VITALS
BODY MASS INDEX: 48.15 KG/M2 | HEIGHT: 65 IN | SYSTOLIC BLOOD PRESSURE: 106 MMHG | DIASTOLIC BLOOD PRESSURE: 70 MMHG | RESPIRATION RATE: 18 BRPM | WEIGHT: 289 LBS | HEART RATE: 116 BPM | OXYGEN SATURATION: 97 %

## 2024-01-31 DIAGNOSIS — J45.909 UNCOMPLICATED ASTHMA, UNSPECIFIED ASTHMA SEVERITY, UNSPECIFIED WHETHER PERSISTENT: ICD-10-CM

## 2024-01-31 DIAGNOSIS — J45.909 UNCOMPLICATED ASTHMA, UNSPECIFIED ASTHMA SEVERITY, UNSPECIFIED WHETHER PERSISTENT: Primary | ICD-10-CM

## 2024-01-31 PROCEDURE — 86005 ALLG SPEC IGE MULTIALLG SCR: CPT

## 2024-01-31 PROCEDURE — 86003 ALLG SPEC IGE CRUDE XTRC EA: CPT

## 2024-01-31 PROCEDURE — 99214 OFFICE O/P EST MOD 30 MIN: CPT | Performed by: INTERNAL MEDICINE

## 2024-01-31 PROCEDURE — 3074F SYST BP LT 130 MM HG: CPT | Performed by: INTERNAL MEDICINE

## 2024-01-31 PROCEDURE — 3078F DIAST BP <80 MM HG: CPT | Performed by: INTERNAL MEDICINE

## 2024-01-31 PROCEDURE — G8427 DOCREV CUR MEDS BY ELIG CLIN: HCPCS | Performed by: INTERNAL MEDICINE

## 2024-01-31 PROCEDURE — 86331 IMMUNODIFFUSION OUCHTERLONY: CPT

## 2024-01-31 PROCEDURE — G8482 FLU IMMUNIZE ORDER/ADMIN: HCPCS | Performed by: INTERNAL MEDICINE

## 2024-01-31 PROCEDURE — 86606 ASPERGILLUS ANTIBODY: CPT

## 2024-01-31 PROCEDURE — G8417 CALC BMI ABV UP PARAM F/U: HCPCS | Performed by: INTERNAL MEDICINE

## 2024-01-31 PROCEDURE — 1036F TOBACCO NON-USER: CPT | Performed by: INTERNAL MEDICINE

## 2024-01-31 RX ORDER — GUAIFENESIN 600 MG/1
1200 TABLET, EXTENDED RELEASE ORAL 2 TIMES DAILY
Qty: 40 TABLET | Refills: 0 | Status: SHIPPED | OUTPATIENT
Start: 2024-01-31 | End: 2024-02-10

## 2024-01-31 RX ORDER — BUDESONIDE 0.5 MG/2ML
1 INHALANT ORAL 2 TIMES DAILY
Qty: 1 EACH | Refills: 2 | Status: SHIPPED | OUTPATIENT
Start: 2024-01-31 | End: 2024-03-01

## 2024-01-31 NOTE — PROGRESS NOTES
Patient having labs drawn at [facility] 1-31-24.     Patient understands to contact office in two weeks to give update on medications. Will schedule follow up pending lab results and effectiveness of medication.           
volume loop is relatively normal.  5.  Six-minute walk test per [facility] protocol.  Baseline oxygen  saturation 96%.  Lowest oxygen saturation 95%.  The patient ambulated  1260 feet.     IMPRESSION:  1.  No obstructive lung defect.  2.  Mild restrictive lung defect, which maybe partially explained by the  patient's obesity.  3.  Mild reduction in diffusion capacity.  No significant oxyhemoglobin  desaturation on six-minute walk testing.           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  4-RECENT COVID                 PLAN:      -keep machine   -on  Dulera  - MULTIPLE COVID ,  -xopenex as needed 3-4 months  -Sniff test to reassess diaphragm   -keep CPAP  - was  on dupoixent however she developed ,developed red sore and then blisster and stopped   -explain that diaphragm paralysis correction will be surgery and we have to send CTS ,she want to hold and see if weight loss helps as she lost 30 pounds,encouraged   Inspective spirometry   If not better cough wise,will consider bronch    Flu and Pneumovax as per primary  On PPI as per primary    Thank you for allowing me to participate in [facility]. I will keep following with you ,should you have any concerns ,please contact us at [facility] pulmonary office     Sincerely,        Nakia Burgos MD  Pulmonary & Critical Care Medicine     NOTE: This report was transcribed using voice recognition software. Every effort was made to ensure accuracy; however, inadvertent computerized transcription errors may be present.

## 2024-01-31 NOTE — PATIENT INSTRUCTIONS
[facility]  Oxygen/[phone]  Option 4 [phone] [address] [place] [address]   [facility] [phone] [address] [place] [address]   [facility]  Oxygen/[phone] [address] [facility], [place] [address]   [facility]  Oxygen/[phone] [address] [facility] [place] [address]  **[place]**   [facility]  Oxygen/[phone]  [phone] [address] [place] [address]   [facility]  Oxygen/[phone] [address] [place] [address]   [facility]  (formerly [facility])  CPAP only [phone] [address] [place] [address]  **[facility] [phone]    1-800 CPAP (store) [phone] [place]   [facility]  [phone]    [facility] [phone]     [facility] [phone]    [facility] [phone]  [phone]                 [facility] ([facility]) [phone]             [facility]         (no PAP equipment)  [phone] [address] [place] [address]   Updated 3/2023

## 2024-02-02 ENCOUNTER — TELEPHONE (OUTPATIENT)
Dept: ADMINISTRATIVE | Age: 44
End: 2024-02-02

## 2024-02-02 DIAGNOSIS — J45.50 UNCOMPLICATED SEVERE PERSISTENT ASTHMA: Primary | ICD-10-CM

## 2024-02-02 NOTE — TELEPHONE ENCOUNTER
Submitted PA for Budesonide 0.5MG/2ML suspension  Via CMM (Key: [id number]) STATUS: PENDING.    Follow up done daily; if no decision with in three days we will refax.  If another three days goes by with no decision will call the insurance for status.

## 2024-02-05 ENCOUNTER — TELEPHONE (OUTPATIENT)
Dept: PULMONOLOGY | Age: 44
End: 2024-02-05

## 2024-02-05 DIAGNOSIS — J45.909 UNCOMPLICATED ASTHMA, UNSPECIFIED ASTHMA SEVERITY, UNSPECIFIED WHETHER PERSISTENT: Primary | ICD-10-CM

## 2024-02-06 LAB
A FLAVUS AB SER QL ID: NORMAL
A FUMIGATUS1 AB SER QL ID: NORMAL
A FUMIGATUS2 AB SER QL: NORMAL
A FUMIGATUS3 AB SER QL ID: NORMAL
A FUMIGATUS6 AB SER QL ID: NORMAL
A PULLULANS AB SER QL ID: NORMAL
BEEF IGE QN: <0.1 KU/L
DEPRECATED MISC ALLERGEN IGE RAST QL: NORMAL
EPID ALLERG MIX73 IGE QN: NEGATIVE KU/L
P BETAE IGE QN: <0.1 KU/L
PIGEON SERUM AB QL ID: NORMAL
PORK IGE QN: <0.1 KU/L
S RECTIVIRGULA AB SER QL ID: NORMAL
S VIRIDIS AB SER QL: NORMAL
T CANDIDUS AB SER QL: NORMAL

## 2024-02-12 NOTE — TELEPHONE ENCOUNTER
Pt stated that this was supposed to be neb solution. Looks like inhaler was sent in please advise

## 2024-02-13 NOTE — TELEPHONE ENCOUNTER
Pt is aware please sign rx Pt also stated that you wanted her to have a neb what medication do you want her to use

## 2024-02-15 NOTE — TELEPHONE ENCOUNTER
Spoke w/ pt and pt wants pulmicort inhaler. Informed pt I would check on pa and see what is going on. Pt also stated she still has not rec'd spirometer. Informed pt that was sent and I would call and find out about this also. Pt voiced understanding.

## 2024-02-16 NOTE — TELEPHONE ENCOUNTER
Pt states [facility] does not do spirometer. Faxed order to [facility].       Started pa in cover my meds for pulmicort inhaler... PA not required.     Called pt's pharm [phone]  Informed to cancel neb for pulmicort - pt wants inhaler. PA required per pt but covermymeds states pa not required. Leela was checking on this and I got sent to . Informed to call back.

## 2024-02-28 ENCOUNTER — OFFICE VISIT (OUTPATIENT)
Dept: PULMONOLOGY | Age: 44
End: 2024-02-28
Payer: COMMERCIAL

## 2024-02-28 ENCOUNTER — HOSPITAL ENCOUNTER (OUTPATIENT)
Age: 44
Discharge: HOME | End: 2024-02-28
Payer: COMMERCIAL

## 2024-02-28 ENCOUNTER — HOSPITAL ENCOUNTER (OUTPATIENT)
Dept: GENERAL RADIOLOGY | Age: 44
Discharge: HOME | End: 2024-02-28
Payer: COMMERCIAL

## 2024-02-28 VITALS
HEIGHT: 65 IN | DIASTOLIC BLOOD PRESSURE: 74 MMHG | RESPIRATION RATE: 18 BRPM | WEIGHT: 289 LBS | SYSTOLIC BLOOD PRESSURE: 102 MMHG | BODY MASS INDEX: 48.15 KG/M2 | OXYGEN SATURATION: 94 % | HEART RATE: 104 BPM

## 2024-02-28 DIAGNOSIS — J45.909 UNCOMPLICATED ASTHMA, UNSPECIFIED ASTHMA SEVERITY, UNSPECIFIED WHETHER PERSISTENT: Primary | ICD-10-CM

## 2024-02-28 DIAGNOSIS — R06.02 SOB (SHORTNESS OF BREATH): ICD-10-CM

## 2024-02-28 DIAGNOSIS — J45.50 UNCOMPLICATED SEVERE PERSISTENT ASTHMA: ICD-10-CM

## 2024-02-28 DIAGNOSIS — J45.909 UNCOMPLICATED ASTHMA, UNSPECIFIED ASTHMA SEVERITY, UNSPECIFIED WHETHER PERSISTENT: ICD-10-CM

## 2024-02-28 PROCEDURE — G8482 FLU IMMUNIZE ORDER/ADMIN: HCPCS | Performed by: INTERNAL MEDICINE

## 2024-02-28 PROCEDURE — 3074F SYST BP LT 130 MM HG: CPT | Performed by: INTERNAL MEDICINE

## 2024-02-28 PROCEDURE — G8417 CALC BMI ABV UP PARAM F/U: HCPCS | Performed by: INTERNAL MEDICINE

## 2024-02-28 PROCEDURE — G8428 CUR MEDS NOT DOCUMENT: HCPCS | Performed by: INTERNAL MEDICINE

## 2024-02-28 PROCEDURE — 71046 X-RAY EXAM CHEST 2 VIEWS: CPT

## 2024-02-28 PROCEDURE — 3078F DIAST BP <80 MM HG: CPT | Performed by: INTERNAL MEDICINE

## 2024-02-28 PROCEDURE — 99214 OFFICE O/P EST MOD 30 MIN: CPT | Performed by: INTERNAL MEDICINE

## 2024-02-28 PROCEDURE — 1036F TOBACCO NON-USER: CPT | Performed by: INTERNAL MEDICINE

## 2024-02-28 RX ORDER — FLUCONAZOLE 100 MG/1
100 TABLET ORAL DAILY
Qty: 5 TABLET | Refills: 0 | Status: SHIPPED | OUTPATIENT
Start: 2024-02-28 | End: 2024-03-04

## 2024-02-28 RX ORDER — PREDNISONE 10 MG/1
TABLET ORAL
Qty: 30 TABLET | Refills: 0 | Status: SHIPPED | OUTPATIENT
Start: 2024-02-28

## 2024-02-28 RX ORDER — LEVOFLOXACIN 500 MG/1
500 TABLET, FILM COATED ORAL DAILY
Qty: 5 TABLET | Refills: 0 | Status: SHIPPED | OUTPATIENT
Start: 2024-02-28 | End: 2024-03-04

## 2024-02-28 NOTE — PROGRESS NOTES
There is no  significant improvement.  2.  Lung volumes:  Total lung capacity is 3.92 liters or 72% of  predicted.  3.  Diffusion capacity:  DLCO is 18.61 mL/min/mmHg, which is 73% of  predicted.  4.  Flow volume loop is relatively normal.  5.  Six-minute walk test per [facility] protocol.  Baseline oxygen  saturation 96%.  Lowest oxygen saturation 95%.  The patient ambulated  1260 feet.     IMPRESSION:  1.  No obstructive lung defect.  2.  Mild restrictive lung defect, which maybe partially explained by the  patient's obesity.  3.  Mild reduction in diffusion capacity.  No significant oxyhemoglobin  desaturation on six-minute walk testing.           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  4-RECENT COVID                 PLAN:      -keep machine   Will give course of levaquin  Will start course of steroids  CXR today   -on  Dulera  - multiple COVID   -keep CPAP,taken ,will do another study later when she is ready   Diflucan while on Levaquin  Advise eat yogurt     - was  on dupoixent however she developed ,developed red sore and then blisster and stopped ,will try other agent later   -explain that diaphragm paralysis correction will be surgery and we have to send CTS ,she want to hold and see if weight loss helps as she lost 30 pounds,encouraged   Inspective spirometry   Flu and Pneumovax as per primary  On PPI as per primary  If have diarrhea to stop Levaquin and call  Ángel siddiqui   Thank you for allowing me to participate in [facility]. I will keep following with you ,should you have any concerns ,please contact us at [facility] pulmonary office     Sincerely,        Nakia Burgos MD  Pulmonary & Critical Care Medicine     NOTE: This report was transcribed using voice recognition software. Every effort was made to ensure accuracy; however, inadvertent computerized transcription errors may be present.

## 2024-03-04 ENCOUNTER — TELEPHONE (OUTPATIENT)
Dept: PULMONOLOGY | Age: 44
End: 2024-03-04

## 2024-03-04 NOTE — TELEPHONE ENCOUNTER
Pt called asking if anyone has responded to her message.  Advised pt of Dr. Crespo's response. Pt asked for an appt tomorrow, scheduled pt for tomorrow 10:45 am.

## 2024-03-04 NOTE — TELEPHONE ENCOUNTER
Patient called stating she's very SOB, no better after completing the antibiotics from last weeks appt with Dr Burgos. Feels she needs her lungs cleaned out again              Do you have the following symptoms?  Shortness of Breath  yes  Wheezing  yes  Cough  yes  Cough Characteristics:  Productive    no, feels it but can't bring it up  Sputum Color    n/a   Hemoptysis   n/a  Consistency of sputum   n/a     Fever:    no    Temp:no  Chills/Sweats:  yea  What other symptoms are you having?:  very SOB, fatigue    How long have you had these symptoms?   Since before LOV 2/28/24     Pharmacy: [facility] [address]          Review medications and allergies:        Allergies?     Latex Rash    Acetaminophen Other (See Comments)    Dupixent [Dupilumab]      Other Dermatitis       BANDAIDS, ADHESIVES     Oxycodone Hcl Other (See Comments)    Penicillins Swelling    Tetracycline Other (See Comments)    Adhesive Tape Rash    Morphine Other (See Comments)       nausea and vomiting  nausea and vomiting  nausea and vomiting       Morphine And Related Other (See Comments)       nausea and vomiting       Oxycodone-Acetaminophen Rash    Percocet [Oxycodone-Acetaminophen] Rash    Sulfa Antibiotics            Currently on Antibiotics?  (Drug/Dose/Frequency and how long on?) just finished levaquin        Systemic Steroids?  (Drug/Dose/Frequency and how long on?) 30mg Predisone taper      Last OV 2/28/24 with Dr. 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

## 2024-03-05 ENCOUNTER — TELEMEDICINE (OUTPATIENT)
Dept: PULMONOLOGY | Age: 44
End: 2024-03-05
Payer: COMMERCIAL

## 2024-03-05 ENCOUNTER — TELEPHONE (OUTPATIENT)
Dept: PULMONOLOGY | Age: 44
End: 2024-03-05

## 2024-03-05 DIAGNOSIS — J45.909 UNCOMPLICATED ASTHMA, UNSPECIFIED ASTHMA SEVERITY, UNSPECIFIED WHETHER PERSISTENT: Primary | ICD-10-CM

## 2024-03-05 PROCEDURE — 1036F TOBACCO NON-USER: CPT | Performed by: INTERNAL MEDICINE

## 2024-03-05 PROCEDURE — G8427 DOCREV CUR MEDS BY ELIG CLIN: HCPCS | Performed by: INTERNAL MEDICINE

## 2024-03-05 PROCEDURE — 99214 OFFICE O/P EST MOD 30 MIN: CPT | Performed by: INTERNAL MEDICINE

## 2024-03-05 PROCEDURE — G8417 CALC BMI ABV UP PARAM F/U: HCPCS | Performed by: INTERNAL MEDICINE

## 2024-03-05 PROCEDURE — G8482 FLU IMMUNIZE ORDER/ADMIN: HCPCS | Performed by: INTERNAL MEDICINE

## 2024-03-05 NOTE — TELEPHONE ENCOUNTER
Pt called and wanted to know \"if her procedure was scheduled yet\" I looked DEEPLY in the chart didn't see anything noted.. Went back and asked beatriz and she stated it was documented that we are waiting on them to call us to give us a time an date.  Pt is aware that we are waiting on this.    stated

## 2024-03-05 NOTE — PROGRESS NOTES
P  [facility]                                               Pulmonary Clinic Consult     I had the pleasure of seeing  Pauline Cordero     Chief Complaint   Patient presents with    Follow-up     illness       HISTORY OF PRESENT ILLNESS:        Interval History: 41 y.o. female The pt had COVID 19 last Jan 2022 with significant cough and SOB however it is better .   Still chewing nicotine gum 1 pack day ,she quit 2015     She states she still has some SOB and PEOPLES and she has some cough  and Peoples    SHE HAD diaphragm paralysis left side    She can 1/2 block ,abpout 2 00 feet     She recently broke /twist November .towrn ligament s/p surgery   She use xopenex as needed and she needed 2-3 time week  She use nebulizer   She use dulera    She developed allergy   No chest paoin ,no Hemoptysis     Today visit  Had covid 5th time a month ago  Still with some cough and sob   Not able to get her sputum out  No Chest pain   She has been doing fair  Still with SOB and cough and wheezing   No fever      ALLERGIES:    Allergies   Allergen Reactions    Latex Rash    Acetaminophen Other (See Comments)    Dupixent [Dupilumab]     Other Dermatitis     BANDAIDS, ADHESIVES     Oxycodone Hcl Other (See Comments)    Penicillins Swelling    Tetracycline Other (See Comments)    Adhesive Tape Rash    Morphine Other (See Comments)     nausea and vomiting  nausea and vomiting  nausea and vomiting      Morphine And Related Other (See Comments)     nausea and vomiting      Oxycodone-Acetaminophen Rash    Percocet [Oxycodone-Acetaminophen] Rash    Sulfa Antibiotics Rash       PAST MEDICAL HISTORY:       Diagnosis Date    Anesthesia complication     severe hypotension with block    Anxiety and depression     Arthritis     Asthma     Chronic bronchitis (HCC)     Fibromyalgia     Hypertension     Migraine     Pneumonia     Rash     Reflux     Sleep apnea     Wears glasses        MEDICATIONS:   Current

## 2024-03-06 PROBLEM — J45.909 ASTHMA: Status: ACTIVE | Noted: 2024-03-05

## 2024-03-06 NOTE — PROGRESS NOTES
.1.  Do not eat or drink anything after 12 midnight prior to surgery.  This includes no water, chewing gum or mints, except for bowel prep complete per MD.  2.  Take the following pills with a small sip of water on the morning of surgery.  3.  Aspirin, Ibuprofen, Advil, Naproxen, Vitamin E and other Anti-inflammatory products should be stopped for 5 days before surgery or as directed by your physician.  4.  Check with your doctor regarding stopping Plavix, Coumadin, Lovenox, Fragmin or other blood thinners.  5.  Do not smoke and do not drink alcoholic beverages 24 hours prior to surgery.  This includes NA Beer.  6.  You may brush your teeth and gargle the morning of surgery.  DO NOT SWALLOW WATER.  7.  You MUST make arrangements for a responsible adult to take you home after your surgery.  You will not be allowed to leave alone or drive yourself home.  It is strongly suggested someone stay with you the first 24 hours.  Your surgery will be cancelled if you do not have a ride home.  8.  A parent/legal guardian must accompany a child scheduled for surgery and plan to stay at the hospital until the child is discharged.  Please do not bring other children with you.  9.  Please wear simple, loose fitting clothing to the hospital.  Do not bring valuables ( money, credit cards, checkbooks, etc.)  Do not wear any makeup (including no eye makeup) or nail polish on your fingers or toes.  10.  Do not wear any jewelry or piercing on the day of surgery.  All body piercing jewelry must be removed.  11.  If you have dentures, they will be removed before going to the OR; we will provide you a container.  If you wear contact lenses or glasses, they will be removed; please bring a case for them.  12.  Notify your Surgeon if you develop any illness between now and surgery time; cough, cold, fever, sore throat, nausea, vomiting, etc.  Please notify your surgeon if you experience dizziness, shortness of breath or blurred vision between  now and the time of your surgery.        To provide excellent care, visitors will be limited to two in a room at any given time.  Please no children under the age of 14 in the surgical department.

## 2024-03-06 NOTE — TELEPHONE ENCOUNTER
Spoke w/ Cristela and she states the orders were not visible and would have not sched a bronch on a Thursday. Cristela states they are going to try and do the 8am bronch.

## 2024-03-06 NOTE — TELEPHONE ENCOUNTER
Pt called and wanted to know \"if her procedure was scheduled yet\" I looked DEEPLY in the chart didn't see anything noted.. Went back and asked beatriz and she stated it was documented that we are waiting on them to call us to give us a time an date.  Pt is aware that we are waiting on this.      You can see case info in Other orders. You can also find appt info in encounters if appt was sched.     Talked to pt and pt informed me that no none told her anything yesterday. Pt stated it was supposed to be sched on Thursday. No one called OP to check on status of procedure.     Called OP today spoke w/ Barbara and asked about appt. Barbara is to call me back and inform of appt time.

## 2024-03-07 ENCOUNTER — HOSPITAL ENCOUNTER (OUTPATIENT)
Age: 44
Setting detail: OUTPATIENT SURGERY
Discharge: STILL ADMITTED OR ADMINISTRATIVE | DRG: 113 | End: 2024-03-07
Attending: INTERNAL MEDICINE | Admitting: INTERNAL MEDICINE
Payer: COMMERCIAL

## 2024-03-07 ENCOUNTER — ANESTHESIA (OUTPATIENT)
Dept: ENDOSCOPY | Age: 44
DRG: 113 | End: 2024-03-07
Payer: COMMERCIAL

## 2024-03-07 ENCOUNTER — TELEPHONE (OUTPATIENT)
Dept: PULMONOLOGY | Age: 44
End: 2024-03-07

## 2024-03-07 ENCOUNTER — APPOINTMENT (OUTPATIENT)
Dept: GENERAL RADIOLOGY | Age: 44
DRG: 113 | End: 2024-03-07
Attending: INTERNAL MEDICINE
Payer: COMMERCIAL

## 2024-03-07 ENCOUNTER — ANESTHESIA EVENT (OUTPATIENT)
Dept: ENDOSCOPY | Age: 44
DRG: 113 | End: 2024-03-07
Payer: COMMERCIAL

## 2024-03-07 ENCOUNTER — HOSPITAL ENCOUNTER (INPATIENT)
Age: 44
LOS: 3 days | Discharge: HOME | DRG: 113 | End: 2024-03-10
Attending: INTERNAL MEDICINE | Admitting: INTERNAL MEDICINE
Payer: COMMERCIAL

## 2024-03-07 VITALS
BODY MASS INDEX: 48.32 KG/M2 | WEIGHT: 290 LBS | TEMPERATURE: 97.2 F | HEIGHT: 65 IN | OXYGEN SATURATION: 96 % | DIASTOLIC BLOOD PRESSURE: 81 MMHG | RESPIRATION RATE: 20 BRPM | SYSTOLIC BLOOD PRESSURE: 129 MMHG | HEART RATE: 94 BPM

## 2024-03-07 DIAGNOSIS — J45.41 MODERATE PERSISTENT ASTHMA WITH EXACERBATION: Primary | ICD-10-CM

## 2024-03-07 PROBLEM — J45.901 EXACERBATION OF PERSISTENT ASTHMA: Status: ACTIVE | Noted: 2024-03-07

## 2024-03-07 PROBLEM — J10.1 INFLUENZA B: Status: ACTIVE | Noted: 2024-03-07

## 2024-03-07 PROBLEM — J96.01 ACUTE RESPIRATORY FAILURE WITH HYPOXIA (HCC): Status: ACTIVE | Noted: 2024-03-07

## 2024-03-07 PROBLEM — I10 PRIMARY HYPERTENSION: Status: ACTIVE | Noted: 2024-03-07

## 2024-03-07 PROBLEM — R06.2 WHEEZING: Status: ACTIVE | Noted: 2024-03-07

## 2024-03-07 PROBLEM — J44.1 COPD EXACERBATION (HCC): Status: ACTIVE | Noted: 2024-03-07

## 2024-03-07 LAB
ANION GAP SERPL CALCULATED.3IONS-SCNC: 11 MMOL/L (ref 3–16)
BASOPHILS # BLD: 0.1 K/UL (ref 0–0.2)
BASOPHILS NFR BLD: 0.7 %
BUN SERPL-MCNC: 13 MG/DL (ref 7–20)
CALCIUM SERPL-MCNC: 8.2 MG/DL (ref 8.3–10.6)
CHLORIDE SERPL-SCNC: 100 MMOL/L (ref 99–110)
CO2 SERPL-SCNC: 25 MMOL/L (ref 21–32)
CREAT SERPL-MCNC: 0.6 MG/DL (ref 0.6–1.1)
DEPRECATED RDW RBC AUTO: 16.8 % (ref 12.4–15.4)
EOSINOPHIL # BLD: 0.1 K/UL (ref 0–0.6)
EOSINOPHIL NFR BLD: 1.2 %
FLUAV RNA RESP QL NAA+PROBE: NOT DETECTED
FLUBV RNA RESP QL NAA+PROBE: DETECTED
GFR SERPLBLD CREATININE-BSD FMLA CKD-EPI: >60 ML/MIN/{1.73_M2}
GLUCOSE BLD-MCNC: 138 MG/DL (ref 70–99)
GLUCOSE BLD-MCNC: 165 MG/DL (ref 70–99)
GLUCOSE BLD-MCNC: 270 MG/DL (ref 70–99)
GLUCOSE SERPL-MCNC: 165 MG/DL (ref 70–99)
HCT VFR BLD AUTO: 41.6 % (ref 36–48)
HGB BLD-MCNC: 13.4 G/DL (ref 12–16)
LYMPHOCYTES # BLD: 3 K/UL (ref 1–5.1)
LYMPHOCYTES NFR BLD: 29.1 %
MCH RBC QN AUTO: 25.1 PG (ref 26–34)
MCHC RBC AUTO-ENTMCNC: 32.2 G/DL (ref 31–36)
MCV RBC AUTO: 77.9 FL (ref 80–100)
MONOCYTES # BLD: 0.4 K/UL (ref 0–1.3)
MONOCYTES NFR BLD: 4.1 %
NEUTROPHILS # BLD: 6.7 K/UL (ref 1.7–7.7)
NEUTROPHILS NFR BLD: 64.9 %
PERFORMED ON: ABNORMAL
PLATELET # BLD AUTO: 284 K/UL (ref 135–450)
PMV BLD AUTO: 8.2 FL (ref 5–10.5)
POTASSIUM SERPL-SCNC: 3.7 MMOL/L (ref 3.5–5.1)
RBC # BLD AUTO: 5.33 M/UL (ref 4–5.2)
SARS-COV-2 RNA RESP QL NAA+PROBE: NOT DETECTED
SODIUM SERPL-SCNC: 136 MMOL/L (ref 136–145)
WBC # BLD AUTO: 10.4 K/UL (ref 4–11)

## 2024-03-07 PROCEDURE — 99222 1ST HOSP IP/OBS MODERATE 55: CPT | Performed by: NURSE PRACTITIONER

## 2024-03-07 PROCEDURE — 36415 COLL VENOUS BLD VENIPUNCTURE: CPT

## 2024-03-07 PROCEDURE — 85025 COMPLETE CBC W/AUTO DIFF WBC: CPT

## 2024-03-07 PROCEDURE — 94640 AIRWAY INHALATION TREATMENT: CPT

## 2024-03-07 PROCEDURE — 2580000003 HC RX 258

## 2024-03-07 PROCEDURE — 80048 BASIC METABOLIC PNL TOTAL CA: CPT

## 2024-03-07 PROCEDURE — 94669 MECHANICAL CHEST WALL OSCILL: CPT

## 2024-03-07 PROCEDURE — 6370000000 HC RX 637 (ALT 250 FOR IP): Performed by: ANESTHESIOLOGY

## 2024-03-07 PROCEDURE — 71045 X-RAY EXAM CHEST 1 VIEW: CPT

## 2024-03-07 PROCEDURE — 6370000000 HC RX 637 (ALT 250 FOR IP): Performed by: NURSE PRACTITIONER

## 2024-03-07 PROCEDURE — 6360000002 HC RX W HCPCS

## 2024-03-07 PROCEDURE — 6370000000 HC RX 637 (ALT 250 FOR IP)

## 2024-03-07 PROCEDURE — 87636 SARSCOV2 & INF A&B AMP PRB: CPT

## 2024-03-07 PROCEDURE — 99255 IP/OBS CONSLTJ NEW/EST HI 80: CPT | Performed by: INTERNAL MEDICINE

## 2024-03-07 PROCEDURE — 1200000000 HC SEMI PRIVATE

## 2024-03-07 RX ORDER — ONDANSETRON 4 MG/1
4 TABLET, ORALLY DISINTEGRATING ORAL EVERY 8 HOURS PRN
Status: DISCONTINUED | OUTPATIENT
Start: 2024-03-07 | End: 2024-03-10 | Stop reason: HOSPADM

## 2024-03-07 RX ORDER — POLYETHYLENE GLYCOL 3350 17 G
2 POWDER IN PACKET (EA) ORAL
Status: DISCONTINUED | OUTPATIENT
Start: 2024-03-07 | End: 2024-03-10 | Stop reason: HOSPADM

## 2024-03-07 RX ORDER — POTASSIUM CHLORIDE 20 MEQ/1
40 TABLET, EXTENDED RELEASE ORAL PRN
Status: DISCONTINUED | OUTPATIENT
Start: 2024-03-07 | End: 2024-03-10 | Stop reason: HOSPADM

## 2024-03-07 RX ORDER — IPRATROPIUM BROMIDE AND ALBUTEROL SULFATE 2.5; .5 MG/3ML; MG/3ML
1 SOLUTION RESPIRATORY (INHALATION) ONCE
Status: COMPLETED | OUTPATIENT
Start: 2024-03-07 | End: 2024-03-07

## 2024-03-07 RX ORDER — INSULIN LISPRO 100 [IU]/ML
0-4 INJECTION, SOLUTION INTRAVENOUS; SUBCUTANEOUS
Status: DISCONTINUED | OUTPATIENT
Start: 2024-03-07 | End: 2024-03-08

## 2024-03-07 RX ORDER — ONDANSETRON 2 MG/ML
4 INJECTION INTRAMUSCULAR; INTRAVENOUS EVERY 6 HOURS PRN
Status: DISCONTINUED | OUTPATIENT
Start: 2024-03-07 | End: 2024-03-10 | Stop reason: HOSPADM

## 2024-03-07 RX ORDER — MONTELUKAST SODIUM 10 MG/1
10 TABLET ORAL EVERY MORNING
Status: DISCONTINUED | OUTPATIENT
Start: 2024-03-08 | End: 2024-03-10 | Stop reason: HOSPADM

## 2024-03-07 RX ORDER — PANTOPRAZOLE SODIUM 40 MG/1
40 TABLET, DELAYED RELEASE ORAL
Status: DISCONTINUED | OUTPATIENT
Start: 2024-03-08 | End: 2024-03-10 | Stop reason: HOSPADM

## 2024-03-07 RX ORDER — POTASSIUM CHLORIDE 7.45 MG/ML
10 INJECTION INTRAVENOUS PRN
Status: DISCONTINUED | OUTPATIENT
Start: 2024-03-07 | End: 2024-03-10 | Stop reason: HOSPADM

## 2024-03-07 RX ORDER — SODIUM CHLORIDE 0.9 % (FLUSH) 0.9 %
10 SYRINGE (ML) INJECTION PRN
Status: DISCONTINUED | OUTPATIENT
Start: 2024-03-07 | End: 2024-03-10 | Stop reason: HOSPADM

## 2024-03-07 RX ORDER — ACETAMINOPHEN 650 MG/1
650 SUPPOSITORY RECTAL EVERY 6 HOURS PRN
Status: DISCONTINUED | OUTPATIENT
Start: 2024-03-07 | End: 2024-03-10 | Stop reason: HOSPADM

## 2024-03-07 RX ORDER — DEXTROSE MONOHYDRATE 100 MG/ML
INJECTION, SOLUTION INTRAVENOUS CONTINUOUS PRN
Status: DISCONTINUED | OUTPATIENT
Start: 2024-03-07 | End: 2024-03-10 | Stop reason: HOSPADM

## 2024-03-07 RX ORDER — BUDESONIDE AND FORMOTEROL FUMARATE DIHYDRATE 160; 4.5 UG/1; UG/1
2 AEROSOL RESPIRATORY (INHALATION)
Status: DISCONTINUED | OUTPATIENT
Start: 2024-03-07 | End: 2024-03-10 | Stop reason: HOSPADM

## 2024-03-07 RX ORDER — GLUCAGON 1 MG/ML
1 KIT INJECTION PRN
Status: DISCONTINUED | OUTPATIENT
Start: 2024-03-07 | End: 2024-03-10 | Stop reason: HOSPADM

## 2024-03-07 RX ORDER — IPRATROPIUM BROMIDE AND ALBUTEROL SULFATE 2.5; .5 MG/3ML; MG/3ML
1 SOLUTION RESPIRATORY (INHALATION)
Status: DISCONTINUED | OUTPATIENT
Start: 2024-03-07 | End: 2024-03-07 | Stop reason: HOSPADM

## 2024-03-07 RX ORDER — ENOXAPARIN SODIUM 100 MG/ML
30 INJECTION SUBCUTANEOUS 2 TIMES DAILY
Status: DISCONTINUED | OUTPATIENT
Start: 2024-03-07 | End: 2024-03-10 | Stop reason: HOSPADM

## 2024-03-07 RX ORDER — ALBUTEROL SULFATE 90 UG/1
2 AEROSOL, METERED RESPIRATORY (INHALATION) EVERY 4 HOURS PRN
Status: DISCONTINUED | OUTPATIENT
Start: 2024-03-07 | End: 2024-03-07 | Stop reason: HOSPADM

## 2024-03-07 RX ORDER — SODIUM CHLORIDE 9 MG/ML
INJECTION, SOLUTION INTRAVENOUS PRN
Status: DISCONTINUED | OUTPATIENT
Start: 2024-03-07 | End: 2024-03-07 | Stop reason: HOSPADM

## 2024-03-07 RX ORDER — IPRATROPIUM BROMIDE AND ALBUTEROL SULFATE 2.5; .5 MG/3ML; MG/3ML
1 SOLUTION RESPIRATORY (INHALATION)
Status: DISCONTINUED | OUTPATIENT
Start: 2024-03-07 | End: 2024-03-10 | Stop reason: HOSPADM

## 2024-03-07 RX ORDER — SODIUM CHLORIDE 0.9 % (FLUSH) 0.9 %
5-40 SYRINGE (ML) INJECTION PRN
Status: DISCONTINUED | OUTPATIENT
Start: 2024-03-07 | End: 2024-03-07 | Stop reason: HOSPADM

## 2024-03-07 RX ORDER — SODIUM CHLORIDE 0.9 % (FLUSH) 0.9 %
5-40 SYRINGE (ML) INJECTION EVERY 12 HOURS SCHEDULED
Status: DISCONTINUED | OUTPATIENT
Start: 2024-03-07 | End: 2024-03-07 | Stop reason: HOSPADM

## 2024-03-07 RX ORDER — INSULIN LISPRO 100 [IU]/ML
0-4 INJECTION, SOLUTION INTRAVENOUS; SUBCUTANEOUS NIGHTLY
Status: DISCONTINUED | OUTPATIENT
Start: 2024-03-07 | End: 2024-03-08

## 2024-03-07 RX ORDER — MAGNESIUM SULFATE 1 G/100ML
1000 INJECTION INTRAVENOUS PRN
Status: DISCONTINUED | OUTPATIENT
Start: 2024-03-07 | End: 2024-03-10 | Stop reason: HOSPADM

## 2024-03-07 RX ORDER — POLYETHYLENE GLYCOL 3350 17 G/17G
17 POWDER, FOR SOLUTION ORAL DAILY PRN
Status: DISCONTINUED | OUTPATIENT
Start: 2024-03-07 | End: 2024-03-10 | Stop reason: HOSPADM

## 2024-03-07 RX ORDER — OSELTAMIVIR PHOSPHATE 75 MG/1
75 CAPSULE ORAL 2 TIMES DAILY
Status: DISCONTINUED | OUTPATIENT
Start: 2024-03-07 | End: 2024-03-10 | Stop reason: HOSPADM

## 2024-03-07 RX ORDER — SODIUM CHLORIDE 9 MG/ML
INJECTION, SOLUTION INTRAVENOUS PRN
Status: DISCONTINUED | OUTPATIENT
Start: 2024-03-07 | End: 2024-03-10 | Stop reason: HOSPADM

## 2024-03-07 RX ORDER — SODIUM CHLORIDE 0.9 % (FLUSH) 0.9 %
5-40 SYRINGE (ML) INJECTION EVERY 12 HOURS SCHEDULED
Status: DISCONTINUED | OUTPATIENT
Start: 2024-03-07 | End: 2024-03-10 | Stop reason: HOSPADM

## 2024-03-07 RX ORDER — PRAVASTATIN SODIUM 10 MG
10 TABLET ORAL DAILY
Status: DISCONTINUED | OUTPATIENT
Start: 2024-03-07 | End: 2024-03-10 | Stop reason: HOSPADM

## 2024-03-07 RX ORDER — ACETAMINOPHEN 325 MG/1
650 TABLET ORAL EVERY 6 HOURS PRN
Status: DISCONTINUED | OUTPATIENT
Start: 2024-03-07 | End: 2024-03-10 | Stop reason: HOSPADM

## 2024-03-07 RX ORDER — ENOXAPARIN SODIUM 100 MG/ML
40 INJECTION SUBCUTANEOUS DAILY
Status: DISCONTINUED | OUTPATIENT
Start: 2024-03-07 | End: 2024-03-07

## 2024-03-07 RX ORDER — CETIRIZINE HYDROCHLORIDE 10 MG/1
10 TABLET ORAL DAILY
Status: DISCONTINUED | OUTPATIENT
Start: 2024-03-07 | End: 2024-03-10 | Stop reason: HOSPADM

## 2024-03-07 RX ADMIN — IPRATROPIUM BROMIDE AND ALBUTEROL SULFATE 1 DOSE: 2.5; .5 SOLUTION RESPIRATORY (INHALATION) at 20:07

## 2024-03-07 RX ADMIN — WATER 40 MG: 1 INJECTION INTRAMUSCULAR; INTRAVENOUS; SUBCUTANEOUS at 16:33

## 2024-03-07 RX ADMIN — Medication 2 PUFF: at 20:07

## 2024-03-07 RX ADMIN — PRAVASTATIN SODIUM 10 MG: 10 TABLET ORAL at 16:06

## 2024-03-07 RX ADMIN — IPRATROPIUM BROMIDE AND ALBUTEROL SULFATE 1 DOSE: 2.5; .5 SOLUTION RESPIRATORY (INHALATION) at 07:39

## 2024-03-07 RX ADMIN — CETIRIZINE HYDROCHLORIDE 10 MG: 10 TABLET ORAL at 16:06

## 2024-03-07 RX ADMIN — OSELTAMIVIR PHOSPHATE 75 MG: 75 CAPSULE ORAL at 16:06

## 2024-03-07 RX ADMIN — AZITHROMYCIN DIHYDRATE 500 MG: 500 INJECTION, POWDER, LYOPHILIZED, FOR SOLUTION INTRAVENOUS at 16:05

## 2024-03-07 RX ADMIN — ENOXAPARIN SODIUM 30 MG: 100 INJECTION SUBCUTANEOUS at 16:09

## 2024-03-07 RX ADMIN — IPRATROPIUM BROMIDE AND ALBUTEROL SULFATE 1 DOSE: 2.5; .5 SOLUTION RESPIRATORY (INHALATION) at 22:56

## 2024-03-07 RX ADMIN — SODIUM CHLORIDE, PRESERVATIVE FREE 10 ML: 5 INJECTION INTRAVENOUS at 22:16

## 2024-03-07 ASSESSMENT — ENCOUNTER SYMPTOMS: SHORTNESS OF BREATH: 1

## 2024-03-07 ASSESSMENT — PAIN - FUNCTIONAL ASSESSMENT: PAIN_FUNCTIONAL_ASSESSMENT: NONE - DENIES PAIN

## 2024-03-07 NOTE — ANESTHESIA PRE PROCEDURE
Antibiotics Rash       Social History     Tobacco Use    Smoking status: Former     Current packs/day: 0.00     Average packs/day: 2.0 packs/day for 22.0 years (44.0 ttl pk-yrs)     Types: Cigarettes     Start date: 10/17/1993     Quit date: 10/17/2015     Years since quittin.3     Passive exposure: Past    Smokeless tobacco: Never   Substance Use Topics    Alcohol use: Yes     Alcohol/week: 0.0 standard drinks of alcohol     Comment: rarely     LABS:    CBC  Lab Results   Component Value Date/Time    WBC 9.7 2024 12:28 PM    HGB 12.7 2024 12:28 PM    HCT 38.6 2024 12:28 PM     2024 12:28 PM     RENAL  Lab Results   Component Value Date/Time     2024 12:28 PM    K 3.5 2024 12:28 PM     2024 12:28 PM    CO2 27 2024 12:28 PM    BUN 11 2024 12:28 PM    CREATININE 0.6 2024 12:28 PM    GLUCOSE 159 (H) 2024 12:28 PM     COAGS  Lab Results   Component Value Date/Time    PROTIME 11.6 2013 03:27 PM    INR 1.02 2013 03:27 PM     Amandeep Becerril MD   3/7/2024

## 2024-03-07 NOTE — DISCHARGE INSTRUCTIONS
PATIENT INSTRUCTIONS  POST-SEDATION    Pauline Cordero        IMMEDIATELY FOLLOWING PROCEDURE:     1) Do not drive or operate machinery for the first twenty four hours after surgery.      2) Do not make any important decisions for twenty four hours after surgery or while taking narcotic pain medications or sedatives.       3) You should NOT BE LEFT UNATTENDED OR ALONE. A responsible adult should be with you for the rest of the day of your procedure and also during the night for your protection and safety.     4) You may experience some light headedness. Rest at home with activity as tolerated. You may not need to go to bed, but it is important to rest for the next 24 hours. You should not engage in athletic sports such as basketball, volleyball, jogging, skating, or activities requiring refined motor skills for 24 hours.     5) If you develop intractable nausea and vomiting or a severe headache please notify your doctor immediately.     6) You are not expected to have any fever, but if you feel warm, take your temperature. If you have a fever 101 degrees or higher, call your doctor.     If you have had an Endoscopy:     ** Eat lightly for your first meal and gradually resume your normal / prescribed diet. DO NOT eat or drink until your gag reflex returns.    ** If you have a sore throat you may use lozenges, or salt water gargles.      ONCE YOU ARE HOME, IF YOU SHOULD HAVE:    Difficulty in breathing, persistent nausea or vomiting, bleeding you feel is excessive, or pain that is unusual, increased abdominal bloating, or any swelling, fever / chills, call your physician. If you cannot contact your physician, but feel that your signs and symptoms need a physician's attention, go to the Emergency Department.      FOLLOW-UP:      Please follow up with Dr. Miles   as scheduled.     Call Dr Burgos if there are any respiratory concerns.  [phone]    BRONCHOSCOPY:    1) Nothing to eat or drink for 2 hours after  procedure. Then start with sips of water, if no difficulty, may drink and eat.    2) Go home and rest for the rest of the day after procedure.    3) May return to normal activity the next day after procedure.    4) You may cough up a few specks of blood, but anything more than specks, (1) one tablespoon or greater, call your physician. If unavailable, go to the nearest emergency room.    5) Remember to report any of the following symptoms to your physician:      -  Increased difficulty in breathing      -  Chest discomfort      -  Coughing up of blood (as noted above)      -  Fever (temperature greater than 100 degrees F. or when associated with shaking, chills, or shivering)      -  Other unusual symptoms that concern you

## 2024-03-07 NOTE — FLOWSHEET NOTE
03/07/24 1415   Vital Signs   Temp 98.1 °F (36.7 °C)   Temp Source Oral   Pulse 87   Heart Rate Source Monitor   Respirations 18   /83   MAP (Calculated) 97   Pain Assessment   Pain Assessment None - Denies Pain   Oxygen Therapy   SpO2 95 %   O2 Device None (Room air)       Shift assessment complete. See flow sheet.   Patients head-toe complete, VS are logged, and active bowel sound noted in all four quadrants.    Pt arrived from endo d/t cancelled bronch. Pt stable no s/s of distress. Alert and oriented denies pain or SOB. Pt stable.     No further needs  noted at this time. Call light and bedside table are within reach. The bed is locked and is in the lowest position.        Jose J Lindsay RN

## 2024-03-07 NOTE — H&P
Patient has severe wheezing   Will hold on bronch ,high risk for vent  Admit as fail OP treatment  discusses with anesthesia, Dr. Lutz hospitalist, endoscopist and the patient as well up

## 2024-03-07 NOTE — CONSULTS
acute distress.   Eyes: PERRL. Conjunctivae anicteric.   ENT: Normal nose. Normal tongue.    Neck:  Trachea is midline.   Respiratory: No accessory muscle usage.  Decreased breath sounds. + wheezes. No rales. No Rhonchi.  Cardiovascular: Normal S1S2. No digit clubbing. No digit cyanosis. No LE edema.    Gastrointestinal: No mass palpated. No tenderness palpated.   Skin: No rash on the exposed extremities.   Psychiatric: No anxiety or Agitation. Alert and Oriented to person, place and time.    CBC:   Recent Labs     03/07/24  1445   WBC 10.4   HGB 13.4   HCT 41.6   MCV 77.9*        BMP:   Recent Labs     03/07/24  1445      K 3.7      CO2 25   BUN 13   CREATININE 0.6      No results for input(s): \"AST\", \"ALT\", \"LIPASE\", \"AMYLASE\", \"ALB\", \"BILIDIR\", \"BILITOT\", \"ALKPHOS\" in the last 72 hours.  No results for input(s): \"PROTIME\", \"INR\" in the last 72 hours.  No results for input(s): \"NITRITE\", \"COLORU\", \"PHUR\", \"LABCAST\", \"WBCUA\", \"RBCUA\", \"MUCUS\", \"TRICHOMONAS\", \"YEAST\", \"BACTERIA\", \"CLARITYU\", \"SPECGRAV\", \"LEUKOCYTESUR\", \"UROBILINOGEN\", \"BILIRUBINUR\", \"BLOODU\", \"GLUCOSEU\", \"AMORPHOUS\" in the last 72 hours.    Invalid input(s): \"KETONESU\"  No results for input(s): \"PHART\", \"EFW4BZH\", \"PO2ART\" in the last 72 hours.    Chest imaging was reviewed by me and showed CXR with clear lungs      ASSESSMENT:   Acute hypoxic respiratory failure   Influenza B  Asthma exacerbation  ARJUN on CPAP    PLAN:  Ok for home cpap  Supplemental oxygen to maintain SaO2 >92%; wean as tolerated  Intensive inhaled bronchodilator therapy.  IV solumedrol 40 mg IV Q12 hrs. Plan to switch to oral prednisone taper when improved.    Tamiflu and Azithromycin  Sputum GS&C.  Acapella QID to mobilize respiratory secretions      Thank you Aubrie [place]* for this consult

## 2024-03-07 NOTE — PROGRESS NOTES
Pt finished a lunch tray.  States feeling no worse.  Respirations easy and regular.  Skin warm and dry.

## 2024-03-07 NOTE — PROGRESS NOTES
Dr Godoy to bedside to discuss admission with patient.  Patient agreeable to being admitted to [facility].  Patients family at bedside. Covid testing performed and sent to lab

## 2024-03-07 NOTE — H&P
result.  Consider re-testing with an alternateFDA-EUA Covid test if  clinically indicated.  Testing was performed using ABELARDO LUCIA SARS-CoV-2 and Influenza A/B  nucleic acid assay. This test is a multiplex Real-Time Reverse  Transcriptase Polymerase Chain Reaction (RT-PCR)-based in vitro  diagnostic test intended for the qualitative detection of nucleic  acids from SARS-CoV-2, influenza A, and influenza B in nasopharyngeal  and nasal swab specimens for use under the FDA’s Emergency Use  Authorization (EUA) only.    Patient Fact Sheet:  https://www.fda.gov/media/803186/download  Provider Fact Sheet: https://www.fda.gov/media/314248/download  EUA: https://www.fda.gov/media/897514/download  IFU: https://www.fda.gov/media/807203/download    Methodology:  RT-PCR          INFLUENZA A NOT DETECTED     INFLUENZA B DETECTED              EKG:    N/A        RADIOLOGY  XR CHEST PORTABLE    (Results Pending)       ASSESSMENT/PLAN:    #Influenza B  #COPD/Asthma exacerbation  #Failed outpatient treatment  -multiple rounds of steroids and antibiotics without improvement  -planned bronchoscopy under general anesthesia today but cancelled due to significant wheezing in already high risk patient  -found to have influenza B  -CXR pending  -tamiflu D#1  -azithromycin D#1  -IV solumedrol  -NPO midnight in case of re-attempt at bronchoscopy tomorrow    #ARJUN  -has CPAP.     #HTN  -BP stable    #DM type 2  -monitor glucose. SSI coverage  -on Trulicity at home.    #Hyperlipidemia  -on Pravastatin    #Obesity, BMI 48.26  -counseled regarding diet modification and weight loss    #Tobacco use  -counseled cessation  -prn nicotine replacement       Note COPD, asthma, influenza B, DM type 2, hypertension makes patient higher risk for morbidity and mortality requiring testing and treatment.     DVT Prophylaxis: Lovenox  Diet: ADULT DIET; Regular; 4 carb choices (60 gm/meal)  Code Status: Full Code    Caryn Lia FNP-C  3/7/2024

## 2024-03-08 LAB
ANION GAP SERPL CALCULATED.3IONS-SCNC: 11 MMOL/L (ref 3–16)
BASOPHILS # BLD: 0 K/UL (ref 0–0.2)
BASOPHILS NFR BLD: 0.3 %
BUN SERPL-MCNC: 11 MG/DL (ref 7–20)
CALCIUM SERPL-MCNC: 8.8 MG/DL (ref 8.3–10.6)
CHLORIDE SERPL-SCNC: 103 MMOL/L (ref 99–110)
CO2 SERPL-SCNC: 23 MMOL/L (ref 21–32)
CREAT SERPL-MCNC: <0.5 MG/DL (ref 0.6–1.1)
DEPRECATED RDW RBC AUTO: 16.8 % (ref 12.4–15.4)
EOSINOPHIL # BLD: 0 K/UL (ref 0–0.6)
EOSINOPHIL NFR BLD: 0 %
GFR SERPLBLD CREATININE-BSD FMLA CKD-EPI: >60 ML/MIN/{1.73_M2}
GLUCOSE BLD-MCNC: 222 MG/DL (ref 70–99)
GLUCOSE BLD-MCNC: 233 MG/DL (ref 70–99)
GLUCOSE BLD-MCNC: 370 MG/DL (ref 70–99)
GLUCOSE BLD-MCNC: 384 MG/DL (ref 70–99)
GLUCOSE SERPL-MCNC: 219 MG/DL (ref 70–99)
HCT VFR BLD AUTO: 40.8 % (ref 36–48)
HGB BLD-MCNC: 13.1 G/DL (ref 12–16)
LYMPHOCYTES # BLD: 0.9 K/UL (ref 1–5.1)
LYMPHOCYTES NFR BLD: 7.1 %
MCH RBC QN AUTO: 24.8 PG (ref 26–34)
MCHC RBC AUTO-ENTMCNC: 32 G/DL (ref 31–36)
MCV RBC AUTO: 77.6 FL (ref 80–100)
MONOCYTES # BLD: 0.1 K/UL (ref 0–1.3)
MONOCYTES NFR BLD: 1 %
NEUTROPHILS # BLD: 11.2 K/UL (ref 1.7–7.7)
NEUTROPHILS NFR BLD: 91.6 %
PERFORMED ON: ABNORMAL
PLATELET # BLD AUTO: 244 K/UL (ref 135–450)
PMV BLD AUTO: 8.2 FL (ref 5–10.5)
POTASSIUM SERPL-SCNC: 4.6 MMOL/L (ref 3.5–5.1)
RBC # BLD AUTO: 5.26 M/UL (ref 4–5.2)
SODIUM SERPL-SCNC: 137 MMOL/L (ref 136–145)
WBC # BLD AUTO: 12.3 K/UL (ref 4–11)

## 2024-03-08 PROCEDURE — 6370000000 HC RX 637 (ALT 250 FOR IP)

## 2024-03-08 PROCEDURE — 99406 BEHAV CHNG SMOKING 3-10 MIN: CPT | Performed by: INTERNAL MEDICINE

## 2024-03-08 PROCEDURE — 83036 HEMOGLOBIN GLYCOSYLATED A1C: CPT

## 2024-03-08 PROCEDURE — 94010 BREATHING CAPACITY TEST: CPT

## 2024-03-08 PROCEDURE — 36415 COLL VENOUS BLD VENIPUNCTURE: CPT

## 2024-03-08 PROCEDURE — 6370000000 HC RX 637 (ALT 250 FOR IP): Performed by: INTERNAL MEDICINE

## 2024-03-08 PROCEDURE — 99233 SBSQ HOSP IP/OBS HIGH 50: CPT | Performed by: INTERNAL MEDICINE

## 2024-03-08 PROCEDURE — 80048 BASIC METABOLIC PNL TOTAL CA: CPT

## 2024-03-08 PROCEDURE — 6370000000 HC RX 637 (ALT 250 FOR IP): Performed by: NURSE PRACTITIONER

## 2024-03-08 PROCEDURE — 6360000002 HC RX W HCPCS

## 2024-03-08 PROCEDURE — 85025 COMPLETE CBC W/AUTO DIFF WBC: CPT

## 2024-03-08 PROCEDURE — 1200000000 HC SEMI PRIVATE

## 2024-03-08 PROCEDURE — 99232 SBSQ HOSP IP/OBS MODERATE 35: CPT | Performed by: INTERNAL MEDICINE

## 2024-03-08 PROCEDURE — 94669 MECHANICAL CHEST WALL OSCILL: CPT

## 2024-03-08 PROCEDURE — 94640 AIRWAY INHALATION TREATMENT: CPT

## 2024-03-08 PROCEDURE — 94761 N-INVAS EAR/PLS OXIMETRY MLT: CPT

## 2024-03-08 PROCEDURE — 2580000003 HC RX 258

## 2024-03-08 RX ORDER — BENZONATATE 100 MG/1
200 CAPSULE ORAL 3 TIMES DAILY
Status: DISCONTINUED | OUTPATIENT
Start: 2024-03-08 | End: 2024-03-10 | Stop reason: HOSPADM

## 2024-03-08 RX ORDER — INSULIN LISPRO 100 [IU]/ML
0-8 INJECTION, SOLUTION INTRAVENOUS; SUBCUTANEOUS
Status: DISCONTINUED | OUTPATIENT
Start: 2024-03-08 | End: 2024-03-10 | Stop reason: HOSPADM

## 2024-03-08 RX ORDER — INSULIN LISPRO 100 [IU]/ML
0-4 INJECTION, SOLUTION INTRAVENOUS; SUBCUTANEOUS NIGHTLY
Status: DISCONTINUED | OUTPATIENT
Start: 2024-03-08 | End: 2024-03-10 | Stop reason: HOSPADM

## 2024-03-08 RX ADMIN — ACETAMINOPHEN 650 MG: 325 TABLET ORAL at 10:48

## 2024-03-08 RX ADMIN — OSELTAMIVIR PHOSPHATE 75 MG: 75 CAPSULE ORAL at 09:07

## 2024-03-08 RX ADMIN — IPRATROPIUM BROMIDE AND ALBUTEROL SULFATE 1 DOSE: 2.5; .5 SOLUTION RESPIRATORY (INHALATION) at 11:42

## 2024-03-08 RX ADMIN — PRAVASTATIN SODIUM 10 MG: 10 TABLET ORAL at 09:06

## 2024-03-08 RX ADMIN — ENOXAPARIN SODIUM 30 MG: 100 INJECTION SUBCUTANEOUS at 20:44

## 2024-03-08 RX ADMIN — WATER 40 MG: 1 INJECTION INTRAMUSCULAR; INTRAVENOUS; SUBCUTANEOUS at 02:30

## 2024-03-08 RX ADMIN — BENZONATATE 200 MG: 100 CAPSULE ORAL at 20:44

## 2024-03-08 RX ADMIN — MONTELUKAST SODIUM 10 MG: 10 TABLET, COATED ORAL at 09:07

## 2024-03-08 RX ADMIN — INSULIN LISPRO 1 UNITS: 100 INJECTION, SOLUTION INTRAVENOUS; SUBCUTANEOUS at 09:06

## 2024-03-08 RX ADMIN — SODIUM CHLORIDE, PRESERVATIVE FREE 10 ML: 5 INJECTION INTRAVENOUS at 20:46

## 2024-03-08 RX ADMIN — AZITHROMYCIN DIHYDRATE 500 MG: 500 INJECTION, POWDER, LYOPHILIZED, FOR SOLUTION INTRAVENOUS at 14:55

## 2024-03-08 RX ADMIN — PANTOPRAZOLE SODIUM 40 MG: 40 TABLET, DELAYED RELEASE ORAL at 06:12

## 2024-03-08 RX ADMIN — IPRATROPIUM BROMIDE AND ALBUTEROL SULFATE 1 DOSE: 2.5; .5 SOLUTION RESPIRATORY (INHALATION) at 07:51

## 2024-03-08 RX ADMIN — INSULIN LISPRO 4 UNITS: 100 INJECTION, SOLUTION INTRAVENOUS; SUBCUTANEOUS at 16:52

## 2024-03-08 RX ADMIN — WATER 40 MG: 1 INJECTION INTRAMUSCULAR; INTRAVENOUS; SUBCUTANEOUS at 14:23

## 2024-03-08 RX ADMIN — ENOXAPARIN SODIUM 30 MG: 100 INJECTION SUBCUTANEOUS at 09:07

## 2024-03-08 RX ADMIN — ACETAMINOPHEN 650 MG: 325 TABLET ORAL at 20:44

## 2024-03-08 RX ADMIN — OSELTAMIVIR PHOSPHATE 75 MG: 75 CAPSULE ORAL at 20:44

## 2024-03-08 RX ADMIN — IPRATROPIUM BROMIDE AND ALBUTEROL SULFATE 1 DOSE: 2.5; .5 SOLUTION RESPIRATORY (INHALATION) at 23:21

## 2024-03-08 RX ADMIN — BENZONATATE 200 MG: 100 CAPSULE ORAL at 14:30

## 2024-03-08 RX ADMIN — SODIUM CHLORIDE, PRESERVATIVE FREE 10 ML: 5 INJECTION INTRAVENOUS at 14:55

## 2024-03-08 RX ADMIN — BENZONATATE 200 MG: 100 CAPSULE ORAL at 09:11

## 2024-03-08 RX ADMIN — INSULIN LISPRO 1 UNITS: 100 INJECTION, SOLUTION INTRAVENOUS; SUBCUTANEOUS at 11:40

## 2024-03-08 RX ADMIN — IPRATROPIUM BROMIDE AND ALBUTEROL SULFATE 1 DOSE: 2.5; .5 SOLUTION RESPIRATORY (INHALATION) at 15:19

## 2024-03-08 RX ADMIN — INSULIN LISPRO 4 UNITS: 100 INJECTION, SOLUTION INTRAVENOUS; SUBCUTANEOUS at 20:47

## 2024-03-08 RX ADMIN — Medication 2 PUFF: at 19:57

## 2024-03-08 RX ADMIN — IPRATROPIUM BROMIDE AND ALBUTEROL SULFATE 1 DOSE: 2.5; .5 SOLUTION RESPIRATORY (INHALATION) at 19:55

## 2024-03-08 RX ADMIN — Medication 2 PUFF: at 07:52

## 2024-03-08 RX ADMIN — CETIRIZINE HYDROCHLORIDE 10 MG: 10 TABLET ORAL at 09:06

## 2024-03-08 ASSESSMENT — PAIN SCALES - GENERAL
PAINLEVEL_OUTOF10: 5
PAINLEVEL_OUTOF10: 5

## 2024-03-08 ASSESSMENT — COPD QUESTIONNAIRES
QUESTION5_HOMEACTIVITIES: 3
QUESTION7_SLEEPQUALITY: 3
QUESTION6_LEAVINGHOUSE: 0
QUESTION4_WALKINCLINE: 5
QUESTION3_CHESTTIGHTNESS: 4
QUESTION8_ENERGYLEVEL: 4
QUESTION2_CHESTPHLEGM: 1
CAT_TOTALSCORE: 25
QUESTION1_COUGHFREQUENCY: 5

## 2024-03-08 ASSESSMENT — PAIN DESCRIPTION - LOCATION: LOCATION: HEAD

## 2024-03-08 ASSESSMENT — PAIN DESCRIPTION - DESCRIPTORS: DESCRIPTORS: ACHING

## 2024-03-08 NOTE — DISCHARGE INSTRUCTIONS
Avoid allergens and chemicals    F/w pulmonary in 2 weeks    Check sugars twice daily and note down. ( fasting and 2 hr post prandial)    Diabetic and low fat diet      F.w PCP in 2 weeks

## 2024-03-08 NOTE — PLAN OF CARE
Problem: Discharge Planning  Goal: Discharge to home or other facility with appropriate resources  3/8/2024 1404 by Kandis Ortiz RN  Outcome: Progressing  3/8/2024 1404 by Kandis Ortiz RN  Outcome: Progressing  3/8/2024 0108 by Sudeep Gavin RN  Outcome: Progressing  Flowsheets (Taken 3/7/2024 2211)  Discharge to home or other facility with appropriate resources: Identify barriers to discharge with patient and caregiver     Problem: Safety - Adult  Goal: Free from fall injury  3/8/2024 1404 by Kandis Ortiz RN  Outcome: Progressing  3/8/2024 1404 by Kandis Ortiz RN  Outcome: Progressing  3/8/2024 0108 by Sudeep Gavin RN  Outcome: Progressing     Problem: Respiratory - Adult  Goal: Achieves optimal ventilation and oxygenation  3/8/2024 1404 by Kandis Ortiz RN  Outcome: Progressing  3/8/2024 1404 by Kandis Ortiz RN  Outcome: Progressing     Problem: Chronic Conditions and Co-morbidities  Goal: Patient's chronic conditions and co-morbidity symptoms are monitored and maintained or improved  3/8/2024 1404 by Kandis Ortiz RN  Outcome: Progressing  3/8/2024 1404 by Kandis Ortiz RN  Outcome: Progressing

## 2024-03-08 NOTE — PLAN OF CARE
Problem: Discharge Planning  Goal: Discharge to home or other facility with appropriate resources  3/8/2024 0108 by Sudeep Gavin, RN  Outcome: Progressing  Flowsheets (Taken 3/7/2024 2211)  Discharge to home or other facility with appropriate resources: Identify barriers to discharge with patient and caregiver  Problem: Safety - Adult  Goal: Free from fall injury  3/8/2024 0108 by Sudeep Gavin, RN  Outcome: Progressing

## 2024-03-08 NOTE — FLOWSHEET NOTE
03/08/24 0800   Vital Signs   Temp 97.5 °F (36.4 °C)   Temp Source Oral   Pulse 88   Heart Rate Source Monitor   Respirations 18   /89   MAP (Calculated) 103   BP Location Left upper arm   BP Method Automatic   Patient Position Semi fowlers   Pain Assessment   Pain Assessment None - Denies Pain   Opioid-Induced Sedation   POSS Score 1   RASS   Arreola Agitation Sedation Scale (RASS) 0   Oxygen Therapy   SpO2 93 %   O2 Device None (Room air)     Patient is alert and oriented, skin warm and dry, respirs e/e unlabored while resting, meds given, VSS, nurse assessment completed, call light and overbed table within easy reach, no other voiced concerns or needs at this time. Kandis Ortiz RN (Mandy)

## 2024-03-09 LAB
GLUCOSE BLD-MCNC: 266 MG/DL (ref 70–99)
GLUCOSE BLD-MCNC: 274 MG/DL (ref 70–99)
GLUCOSE BLD-MCNC: 284 MG/DL (ref 70–99)
GLUCOSE BLD-MCNC: 297 MG/DL (ref 70–99)
PERFORMED ON: ABNORMAL

## 2024-03-09 PROCEDURE — 6370000000 HC RX 637 (ALT 250 FOR IP): Performed by: INTERNAL MEDICINE

## 2024-03-09 PROCEDURE — 1200000000 HC SEMI PRIVATE

## 2024-03-09 PROCEDURE — 99232 SBSQ HOSP IP/OBS MODERATE 35: CPT | Performed by: INTERNAL MEDICINE

## 2024-03-09 PROCEDURE — 6370000000 HC RX 637 (ALT 250 FOR IP)

## 2024-03-09 PROCEDURE — 94640 AIRWAY INHALATION TREATMENT: CPT

## 2024-03-09 PROCEDURE — 6370000000 HC RX 637 (ALT 250 FOR IP): Performed by: NURSE PRACTITIONER

## 2024-03-09 PROCEDURE — 94761 N-INVAS EAR/PLS OXIMETRY MLT: CPT

## 2024-03-09 PROCEDURE — 2580000003 HC RX 258: Performed by: INTERNAL MEDICINE

## 2024-03-09 PROCEDURE — 6360000002 HC RX W HCPCS

## 2024-03-09 PROCEDURE — 2580000003 HC RX 258

## 2024-03-09 PROCEDURE — 94669 MECHANICAL CHEST WALL OSCILL: CPT

## 2024-03-09 PROCEDURE — 99223 1ST HOSP IP/OBS HIGH 75: CPT | Performed by: INTERNAL MEDICINE

## 2024-03-09 RX ORDER — IPRATROPIUM BROMIDE AND ALBUTEROL SULFATE 2.5; .5 MG/3ML; MG/3ML
1 SOLUTION RESPIRATORY (INHALATION) EVERY 4 HOURS PRN
Status: DISCONTINUED | OUTPATIENT
Start: 2024-03-09 | End: 2024-03-10 | Stop reason: HOSPADM

## 2024-03-09 RX ORDER — HYDROCODONE BITARTRATE AND HOMATROPINE METHYLBROMIDE ORAL SOLUTION 5; 1.5 MG/5ML; MG/5ML
5 LIQUID ORAL EVERY 6 HOURS PRN
Status: DISCONTINUED | OUTPATIENT
Start: 2024-03-09 | End: 2024-03-10 | Stop reason: HOSPADM

## 2024-03-09 RX ORDER — SODIUM CHLORIDE 9 MG/ML
INJECTION, SOLUTION INTRAVENOUS CONTINUOUS
Status: DISCONTINUED | OUTPATIENT
Start: 2024-03-09 | End: 2024-03-10 | Stop reason: HOSPADM

## 2024-03-09 RX ORDER — INSULIN GLARGINE 100 [IU]/ML
10 INJECTION, SOLUTION SUBCUTANEOUS ONCE
Status: COMPLETED | OUTPATIENT
Start: 2024-03-09 | End: 2024-03-09

## 2024-03-09 RX ADMIN — OSELTAMIVIR PHOSPHATE 75 MG: 75 CAPSULE ORAL at 07:41

## 2024-03-09 RX ADMIN — CETIRIZINE HYDROCHLORIDE 10 MG: 10 TABLET ORAL at 07:41

## 2024-03-09 RX ADMIN — HYDROCODONE BITARTRATE AND HOMATROPINE METHYLBROMIDE 5 ML: 5; 1.5 SOLUTION ORAL at 18:51

## 2024-03-09 RX ADMIN — HYDROCODONE BITARTRATE AND HOMATROPINE METHYLBROMIDE 5 ML: 5; 1.5 SOLUTION ORAL at 13:18

## 2024-03-09 RX ADMIN — Medication 2 PUFF: at 19:25

## 2024-03-09 RX ADMIN — WATER 40 MG: 1 INJECTION INTRAMUSCULAR; INTRAVENOUS; SUBCUTANEOUS at 03:26

## 2024-03-09 RX ADMIN — SODIUM CHLORIDE: 9 INJECTION, SOLUTION INTRAVENOUS at 13:17

## 2024-03-09 RX ADMIN — PANTOPRAZOLE SODIUM 40 MG: 40 TABLET, DELAYED RELEASE ORAL at 05:59

## 2024-03-09 RX ADMIN — INSULIN LISPRO 4 UNITS: 100 INJECTION, SOLUTION INTRAVENOUS; SUBCUTANEOUS at 16:37

## 2024-03-09 RX ADMIN — ENOXAPARIN SODIUM 30 MG: 100 INJECTION SUBCUTANEOUS at 07:42

## 2024-03-09 RX ADMIN — ENOXAPARIN SODIUM 30 MG: 100 INJECTION SUBCUTANEOUS at 20:59

## 2024-03-09 RX ADMIN — IPRATROPIUM BROMIDE AND ALBUTEROL SULFATE 1 DOSE: 2.5; .5 SOLUTION RESPIRATORY (INHALATION) at 11:22

## 2024-03-09 RX ADMIN — INSULIN LISPRO 4 UNITS: 100 INJECTION, SOLUTION INTRAVENOUS; SUBCUTANEOUS at 11:36

## 2024-03-09 RX ADMIN — Medication 2 PUFF: at 07:42

## 2024-03-09 RX ADMIN — INSULIN GLARGINE 10 UNITS: 100 INJECTION, SOLUTION SUBCUTANEOUS at 13:29

## 2024-03-09 RX ADMIN — SODIUM CHLORIDE, PRESERVATIVE FREE 10 ML: 5 INJECTION INTRAVENOUS at 13:19

## 2024-03-09 RX ADMIN — PRAVASTATIN SODIUM 10 MG: 10 TABLET ORAL at 07:41

## 2024-03-09 RX ADMIN — BENZONATATE 200 MG: 100 CAPSULE ORAL at 12:52

## 2024-03-09 RX ADMIN — BENZONATATE 200 MG: 100 CAPSULE ORAL at 07:41

## 2024-03-09 RX ADMIN — INSULIN LISPRO 4 UNITS: 100 INJECTION, SOLUTION INTRAVENOUS; SUBCUTANEOUS at 07:41

## 2024-03-09 RX ADMIN — IPRATROPIUM BROMIDE AND ALBUTEROL SULFATE 1 DOSE: 2.5; .5 SOLUTION RESPIRATORY (INHALATION) at 22:53

## 2024-03-09 RX ADMIN — MONTELUKAST SODIUM 10 MG: 10 TABLET, COATED ORAL at 07:41

## 2024-03-09 RX ADMIN — IPRATROPIUM BROMIDE AND ALBUTEROL SULFATE 1 DOSE: 2.5; .5 SOLUTION RESPIRATORY (INHALATION) at 07:37

## 2024-03-09 RX ADMIN — BENZONATATE 200 MG: 100 CAPSULE ORAL at 20:58

## 2024-03-09 RX ADMIN — WATER 40 MG: 1 INJECTION INTRAMUSCULAR; INTRAVENOUS; SUBCUTANEOUS at 13:18

## 2024-03-09 RX ADMIN — ACETAMINOPHEN 650 MG: 325 TABLET ORAL at 07:41

## 2024-03-09 RX ADMIN — AZITHROMYCIN DIHYDRATE 500 MG: 500 INJECTION, POWDER, LYOPHILIZED, FOR SOLUTION INTRAVENOUS at 14:26

## 2024-03-09 RX ADMIN — IPRATROPIUM BROMIDE AND ALBUTEROL SULFATE 1 DOSE: 2.5; .5 SOLUTION RESPIRATORY (INHALATION) at 19:23

## 2024-03-09 RX ADMIN — OSELTAMIVIR PHOSPHATE 75 MG: 75 CAPSULE ORAL at 20:59

## 2024-03-09 RX ADMIN — IPRATROPIUM BROMIDE AND ALBUTEROL SULFATE 1 DOSE: 2.5; .5 SOLUTION RESPIRATORY (INHALATION) at 15:10

## 2024-03-09 ASSESSMENT — PAIN DESCRIPTION - DESCRIPTORS: DESCRIPTORS: ACHING

## 2024-03-09 ASSESSMENT — PAIN DESCRIPTION - LOCATION: LOCATION: HEAD

## 2024-03-09 ASSESSMENT — PAIN SCALES - GENERAL
PAINLEVEL_OUTOF10: 0
PAINLEVEL_OUTOF10: 0

## 2024-03-09 NOTE — PLAN OF CARE
Problem: Discharge Planning  Goal: Discharge to home or other facility with appropriate resources  3/9/2024 1159 by Kandis Ortiz RN  Outcome: Progressing  3/8/2024 2330 by Katlyn Stevenson RN  Outcome: Progressing     Problem: Safety - Adult  Goal: Free from fall injury  3/9/2024 1159 by Kandis Ortiz RN  Outcome: Progressing  3/8/2024 2330 by Katlyn Stevenson RN  Outcome: Progressing     Problem: Respiratory - Adult  Goal: Achieves optimal ventilation and oxygenation  3/9/2024 1159 by Kandis Ortiz RN  Outcome: Progressing  3/8/2024 2330 by Katlyn Stevenson RN  Outcome: Progressing     Problem: Chronic Conditions and Co-morbidities  Goal: Patient's chronic conditions and co-morbidity symptoms are monitored and maintained or improved  3/9/2024 1159 by Kandis Ortiz RN  Outcome: Progressing  3/8/2024 2330 by Katlyn Stevenson RN  Outcome: Progressing     Problem: Pain  Goal: Verbalizes/displays adequate comfort level or baseline comfort level  3/9/2024 1159 by Kandis Ortiz RN  Outcome: Progressing  3/8/2024 2330 by Katlyn Stevenson RN  Outcome: Progressing

## 2024-03-09 NOTE — PLAN OF CARE
Problem: Discharge Planning  Goal: Discharge to home or other facility with appropriate resources  3/8/2024 2330 by Katlyn Stevenson RN  Outcome: Progressing  3/8/2024 1404 by Kandis Ortiz RN  Outcome: Progressing     Problem: Safety - Adult  Goal: Free from fall injury  3/8/2024 2330 by Katlyn Stevenson RN  Outcome: Progressing  3/8/2024 1404 by Kandis Ortiz RN  Outcome: Progressing     Problem: Respiratory - Adult  Goal: Achieves optimal ventilation and oxygenation  3/8/2024 2330 by Katlyn Stevenson RN  Outcome: Progressing  3/8/2024 1404 by Kandis Ortiz RN  Outcome: Progressing     Problem: Chronic Conditions and Co-morbidities  Goal: Patient's chronic conditions and co-morbidity symptoms are monitored and maintained or improved  3/8/2024 2330 by Katlyn Stevenson RN  Outcome: Progressing  3/8/2024 1404 by Kandis Ortiz RN  Outcome: Progressing     Problem: Pain  Goal: Verbalizes/displays adequate comfort level or baseline comfort level  Outcome: Progressing

## 2024-03-09 NOTE — FLOWSHEET NOTE
03/08/24 2030   Vital Signs   Temp 97.7 °F (36.5 °C)   Temp Source Oral   Pulse (!) 120   Heart Rate Source Monitor   Respirations 18   BP (!) 141/82   MAP (Calculated) 102   BP Location Left upper arm   BP Method Automatic   Patient Position High fowlers   Pain Assessment   Pain Assessment 0-10   Pain Level 5   Opioid-Induced Sedation   POSS Score 1   Oxygen Therapy   SpO2 95 %   O2 Device None (Room air)     HS assessment completed.  No signs or symptoms of distress noted. Patient tolerated night medications well. Respirations easy and even. Bed in lowest position,  bed rails x2 up,  Call light within reach.     Bedside Mobility Assessment Tool (BMAT):     Assessment Level 1- Sit and Shake    1. From a semi-reclined position, ask patient to sit up and rotate to a seated position at the side of the bed. Can use the bedrail.    2. Ask patient to reach out and grab your hand and shake making sure patient reaches across his/her midline.   Pass- Patient is able to come to a seated position, maintain core strength. Maintains seated balance while reaching across midline. Move on to Assessment Level 2.     Assessment Level 2- Stretch and Point   1. With patient in seated position at the side of the bed, have patient place both feet on the floor (or stool) with knees no higher than hips.    2. Ask patient to stretch one leg and straighten the knee, then bend the ankle/flex and point the toes. If appropriate, repeat with the other leg.   Pass- Patient is able to demonstrate appropriate quad strength on intended weight bearing limb(s). Move onto Assessment Level 3.     Assessment Level 3- Stand   1. Ask patient to elevate off the bed or chair (seated to standing) using an assistive device (cane, bedrail).    2. Patient should be able to raise buttocks off be and hold for a count of five. May repeat once.   Pass- Patient maintains standing stability for at least 5 seconds, proceed to assessment level 4.    Assessment Level

## 2024-03-09 NOTE — FLOWSHEET NOTE
03/09/24 0730   Vital Signs   Temp 97.5 °F (36.4 °C)   Temp Source Oral   Pulse 99   Heart Rate Source Monitor   Respirations 20   BP (!) 151/88   MAP (Calculated) 109   BP Location Left upper arm   BP Method Automatic   Patient Position High fowlers   Pain Assessment   Pain Assessment None - Denies Pain   Opioid-Induced Sedation   POSS Score 1   RASS   Arreola Agitation Sedation Scale (RASS) 0   Oxygen Therapy   SpO2 95 %   O2 Device None (Room air)     Patient complained of headache    Alert and oriented, skin warm and dry, respirs easy even unlabored, wheezes, meds given, VSS, nurse assessment completed, call light and overbed table within easy reach, no voiced concerns or needs at this time. See flow sheet and MAR. Kandis Ortiz RN (Mandy)

## 2024-03-10 VITALS
BODY MASS INDEX: 47.65 KG/M2 | RESPIRATION RATE: 18 BRPM | HEART RATE: 100 BPM | TEMPERATURE: 97.7 F | OXYGEN SATURATION: 95 % | WEIGHT: 286 LBS | DIASTOLIC BLOOD PRESSURE: 94 MMHG | HEIGHT: 65 IN | SYSTOLIC BLOOD PRESSURE: 141 MMHG

## 2024-03-10 LAB
GLUCOSE BLD-MCNC: 180 MG/DL (ref 70–99)
GLUCOSE BLD-MCNC: 192 MG/DL (ref 70–99)
PERFORMED ON: ABNORMAL
PERFORMED ON: ABNORMAL

## 2024-03-10 PROCEDURE — 6370000000 HC RX 637 (ALT 250 FOR IP)

## 2024-03-10 PROCEDURE — 99238 HOSP IP/OBS DSCHRG MGMT 30/<: CPT | Performed by: INTERNAL MEDICINE

## 2024-03-10 PROCEDURE — 94669 MECHANICAL CHEST WALL OSCILL: CPT

## 2024-03-10 PROCEDURE — 94761 N-INVAS EAR/PLS OXIMETRY MLT: CPT

## 2024-03-10 PROCEDURE — 6370000000 HC RX 637 (ALT 250 FOR IP): Performed by: INTERNAL MEDICINE

## 2024-03-10 PROCEDURE — 6360000002 HC RX W HCPCS

## 2024-03-10 PROCEDURE — 2580000003 HC RX 258

## 2024-03-10 PROCEDURE — 6370000000 HC RX 637 (ALT 250 FOR IP): Performed by: NURSE PRACTITIONER

## 2024-03-10 PROCEDURE — 99233 SBSQ HOSP IP/OBS HIGH 50: CPT | Performed by: INTERNAL MEDICINE

## 2024-03-10 PROCEDURE — 94640 AIRWAY INHALATION TREATMENT: CPT

## 2024-03-10 RX ORDER — PREDNISONE 20 MG/1
40 TABLET ORAL DAILY
Status: DISCONTINUED | OUTPATIENT
Start: 2024-03-10 | End: 2024-03-10 | Stop reason: HOSPADM

## 2024-03-10 RX ORDER — OSELTAMIVIR PHOSPHATE 75 MG/1
75 CAPSULE ORAL 2 TIMES DAILY
Qty: 4 CAPSULE | Refills: 0 | Status: ON HOLD | OUTPATIENT
Start: 2024-03-10 | End: 2024-03-15 | Stop reason: HOSPADM

## 2024-03-10 RX ORDER — PREDNISONE 10 MG/1
TABLET ORAL
Qty: 30 TABLET | Refills: 0 | Status: ON HOLD | OUTPATIENT
Start: 2024-03-10 | End: 2024-03-15

## 2024-03-10 RX ORDER — HYDROCODONE BITARTRATE AND HOMATROPINE METHYLBROMIDE ORAL SOLUTION 5; 1.5 MG/5ML; MG/5ML
5 LIQUID ORAL EVERY 6 HOURS PRN
Qty: 100 ML | Refills: 0 | Status: SHIPPED | OUTPATIENT
Start: 2024-03-10 | End: 2024-03-15

## 2024-03-10 RX ORDER — AZITHROMYCIN 250 MG/1
250 TABLET, FILM COATED ORAL DAILY
Qty: 3 TABLET | Refills: 0 | Status: ON HOLD | OUTPATIENT
Start: 2024-03-10 | End: 2024-03-15 | Stop reason: HOSPADM

## 2024-03-10 RX ORDER — ALBUTEROL SULFATE 1.25 MG/3ML
1 SOLUTION RESPIRATORY (INHALATION) EVERY 6 HOURS PRN
Qty: 360 ML | Refills: 3 | Status: SHIPPED | OUTPATIENT
Start: 2024-03-10

## 2024-03-10 RX ORDER — GLIPIZIDE 5 MG/1
5 TABLET, FILM COATED, EXTENDED RELEASE ORAL DAILY
Qty: 30 TABLET | Refills: 3 | Status: SHIPPED | OUTPATIENT
Start: 2024-03-10

## 2024-03-10 RX ORDER — BENZONATATE 200 MG/1
200 CAPSULE ORAL 3 TIMES DAILY
Qty: 21 CAPSULE | Refills: 0 | Status: SHIPPED | OUTPATIENT
Start: 2024-03-10 | End: 2024-03-17

## 2024-03-10 RX ADMIN — MONTELUKAST SODIUM 10 MG: 10 TABLET, COATED ORAL at 08:41

## 2024-03-10 RX ADMIN — PRAVASTATIN SODIUM 10 MG: 10 TABLET ORAL at 08:41

## 2024-03-10 RX ADMIN — PANTOPRAZOLE SODIUM 40 MG: 40 TABLET, DELAYED RELEASE ORAL at 04:35

## 2024-03-10 RX ADMIN — ENOXAPARIN SODIUM 30 MG: 100 INJECTION SUBCUTANEOUS at 08:44

## 2024-03-10 RX ADMIN — IPRATROPIUM BROMIDE AND ALBUTEROL SULFATE 1 DOSE: 2.5; .5 SOLUTION RESPIRATORY (INHALATION) at 11:19

## 2024-03-10 RX ADMIN — ACETAMINOPHEN 650 MG: 325 TABLET ORAL at 08:48

## 2024-03-10 RX ADMIN — IPRATROPIUM BROMIDE AND ALBUTEROL SULFATE 1 DOSE: 2.5; .5 SOLUTION RESPIRATORY (INHALATION) at 07:57

## 2024-03-10 RX ADMIN — WATER 40 MG: 1 INJECTION INTRAMUSCULAR; INTRAVENOUS; SUBCUTANEOUS at 04:35

## 2024-03-10 RX ADMIN — HYDROCODONE BITARTRATE AND HOMATROPINE METHYLBROMIDE 5 ML: 5; 1.5 SOLUTION ORAL at 04:35

## 2024-03-10 RX ADMIN — PREDNISONE 40 MG: 20 TABLET ORAL at 08:40

## 2024-03-10 RX ADMIN — Medication 2 PUFF: at 08:02

## 2024-03-10 RX ADMIN — CETIRIZINE HYDROCHLORIDE 10 MG: 10 TABLET ORAL at 08:41

## 2024-03-10 RX ADMIN — HYDROCODONE BITARTRATE AND HOMATROPINE METHYLBROMIDE 5 ML: 5; 1.5 SOLUTION ORAL at 10:31

## 2024-03-10 RX ADMIN — OSELTAMIVIR PHOSPHATE 75 MG: 75 CAPSULE ORAL at 08:40

## 2024-03-10 RX ADMIN — BENZONATATE 200 MG: 100 CAPSULE ORAL at 08:41

## 2024-03-10 ASSESSMENT — PAIN SCALES - GENERAL: PAINLEVEL_OUTOF10: 0

## 2024-03-10 NOTE — FLOWSHEET NOTE
03/09/24 2045   Vital Signs   Temp 97.7 °F (36.5 °C)   Temp Source Oral   Pulse 92   Heart Rate Source Monitor   Respirations 18   BP (!) 137/94   MAP (Calculated) 108   BP Location Right lower arm   BP Method Automatic   Patient Position Semi fowlers   Pain Assessment   Pain Assessment None - Denies Pain   Pain Level 0   Opioid-Induced Sedation   POSS Score 1   Oxygen Therapy   SpO2 95 %   O2 Device None (Room air)     HS assessment completed.  No signs or symptoms of distress noted. Patient tolerated night medications well. Respirations easy and even. Bed in lowest position,  bed rails x2 up,  Call light within reach.     Bedside Mobility Assessment Tool (BMAT):     Assessment Level 1- Sit and Shake    1. From a semi-reclined position, ask patient to sit up and rotate to a seated position at the side of the bed. Can use the bedrail.    2. Ask patient to reach out and grab your hand and shake making sure patient reaches across his/her midline.   Pass- Patient is able to come to a seated position, maintain core strength. Maintains seated balance while reaching across midline. Move on to Assessment Level 2.     Assessment Level 2- Stretch and Point   1. With patient in seated position at the side of the bed, have patient place both feet on the floor (or stool) with knees no higher than hips.    2. Ask patient to stretch one leg and straighten the knee, then bend the ankle/flex and point the toes. If appropriate, repeat with the other leg.   Pass- Patient is able to demonstrate appropriate quad strength on intended weight bearing limb(s). Move onto Assessment Level 3.     Assessment Level 3- Stand   1. Ask patient to elevate off the bed or chair (seated to standing) using an assistive device (cane, bedrail).    2. Patient should be able to raise buttocks off be and hold for a count of five. May repeat once.   Pass- Patient maintains standing stability for at least 5 seconds, proceed to assessment level

## 2024-03-10 NOTE — FLOWSHEET NOTE
03/10/24 0830   Vital Signs   Temp 97.7 °F (36.5 °C)   Temp Source Oral   Pulse 100   Heart Rate Source Monitor   Respirations 18   BP (!) 141/94   MAP (Calculated) 110   BP Location Left lower arm   BP Method Automatic   Patient Position Semi fowlers   Pain Assessment   Pain Assessment None - Denies Pain   Opioid-Induced Sedation   POSS Score 1   RASS   Arreola Agitation Sedation Scale (RASS) 0   Oxygen Therapy   SpO2 95 %   O2 Device None (Room air)     Tylenol for headache until hycodan available  Alert and oriented, skin warm and dry, respirs e/e unlabored, meds given, VSS, nurse assessment completed, call light and overbed table within easy reach, no other voiced concerns or needs at this time. See flow sheet and MAR. Kandis Ortiz RN (Mandy)

## 2024-03-10 NOTE — PROGRESS NOTES
Pharmacist Review and Automatic Dose Adjustment of Prophylactic Enoxaparin    The reviewing pharmacist has made an adjustment to the ordered enoxaparin dose or converted to UFH per the approved [facility] protocol and table as identified below.      Pauline Cordero is a 43 y.o. female.     Recent Labs     03/07/24  1445   CREATININE 0.6     Estimated Creatinine Clearance: 164 mL/min (based on SCr of 0.6 mg/dL).    Recent Labs     03/07/24  1445   HGB 13.4   HCT 41.6        No results for input(s): \"INR\" in the last 72 hours.    Height:   Ht Readings from Last 1 Encounters:   03/06/24 1.651 m (5' 5\")     Weight:  Wt Readings from Last 1 Encounters:   03/06/24 131.5 kg (290 lb)         Plan: Based upon the patient's weight and renal function    Ordered: Enoxaparin 40mg SUBQ Daily    Changed/converted to    New Order: Enoxaparin 30mg SUBQ BID    Thank you,  Ivan Posadas [facility]  3/7/2024, 3:22 PM  
  Physician Progress Note      PATIENT:               ANDREA DIXON  [facility] #:                  [id number]  :                       1980  ADMIT DATE:       3/7/2024 2:25 PM  DISCH DATE:  RESPONDING  PROVIDER #:        Uriel Crespo MD          QUERY TEXT:      Patient admitted with Asthma exacerbation . Noted documentation of  Acute   hypoxic respiratory failure  in consult note  dated 3/7/2024 . In order to   support the diagnosis of acute respiratory failure, please include additional   clinical indicators in your documentation.  Or please document if the   diagnosis of acute respiratory failure has been ruled out after further study.    The medical record reflects the following:  Risk Factors: asthma exacerbation, influenza B  Clinical Indicators:  Acute hypoxic  respiratory failure noted per   Pulmonology, pt maintaining oxygen on room air, RR 16-20, SPO2 93-95%, dyspnea  Treatment:  solumedrol, cultures, Tamiflu and Azithromycin    Thank you,  Gail May RN, CDS  [email or web address]  Options provided:  -- Acute Respiratory Failure as evidenced by, Please document evidence.  -- Other - I will add my own diagnosis  -- Disagree - Not applicable / Not valid  -- Disagree - Clinically unable to determine / Unknown  -- Refer to Clinical Documentation Reviewer    PROVIDER RESPONSE TEXT:    Looks like she was never on O2. I will remove from my note    Query created by: Gali May on 3/8/2024 10:33 AM      Electronically signed by:  Uriel Crespo MD 3/8/2024 12:12 PM          
BP (!) 141/87   Pulse 88   Temp 98.6 °F (37 °C) (Oral)   Resp 20   SpO2 94%     Patient alert and oriented. Assessment completed. Respiration easy, even and unlabored. Patient tolerated night meds well. Call light and bedside table within reach. Bed at lowest position, locked side rails x2.  Pt denies needs at this time.        
Bedside Mobility Assessment Tool (BMAT):     Assessment Level 1- Sit and Shake    1. From a semi-reclined position, ask patient to sit up and rotate to a seated position at the side of the bed. Can use the bedrail.    2. Ask patient to reach out and grab your hand and shake making sure patient reaches across his/her midline.   Pass- Patient is able to come to a seated position, maintain core strength. Maintains seated balance while reaching across midline. Move on to Assessment Level 2.     Assessment Level 2- Stretch and Point   1. With patient in seated position at the side of the bed, have patient place both feet on the floor (or stool) with knees no higher than hips.    2. Ask patient to stretch one leg and straighten the knee, then bend the ankle/flex and point the toes. If appropriate, repeat with the other leg.   Pass- Patient is able to demonstrate appropriate quad strength on intended weight bearing limb(s). Move onto Assessment Level 3.     Assessment Level 3- Stand   1. Ask patient to elevate off the bed or chair (seated to standing) using an assistive device (cane, bedrail).    2. Patient should be able to raise buttocks off be and hold for a count of five. May repeat once.   Pass- Patient maintains standing stability for at least 5 seconds, proceed to assessment level 4.    Assessment Level 4- Walk   1. Ask patient to march in place at bedside.    2. Then ask patient to advance step and return each foot. Some medical conditions may render a patient from stepping backwards, use your best clinical judgement.   Pass- Patient demonstrates balance while shifting weight and ability to step, takes independent steps, does not use assistive device patient is MOBILITY LEVEL 4.      Mobility Level- 4    Patient is able to demonstrate the ability to move from a reclining position to an upright position within the recliner.      
Bedside report given to Declan YIP. Pt denies needs at this time. No s/s of distress. Respirations easy and unlabored. Pt stable. Bed in low position call light within reach. No further needs at this time.    
Consult has been called to Dr. andrews on 3/7/24. Spoke with alesha. 2:38 PM    Maria Salas  3/7/2024  
Handoff report and transfer of care given at bedside to barbara.  Patient in stable condition, denies needs/concerns at this time.  Call light within reach.     
Handoff report and transfer of care given at bedside to barbara.  Patient in stable condition, denies needs/concerns at this time.  Call light within reach.     
IV catheter discontinued intact. Site without signs and symptoms of complications. Dressing and pressure applied.     IV removed and discharge instructions completed. All questions were answered to patients satisfaction.   Request for transport placed, all personal belongs packed. No further needs noted.      
IV catheter discontinued intact. Site without signs and symptoms of complications. Dressing and pressure applied. (Left top of hand)    New IV placed in right AC, blood return and flushed, IV steroids and IV antbx running with no complications at this time.    Kandis Ortiz RN (Mandy)    
Patient complain of headache. PRN tylenol admin.  
Patient complaining of headache. Too early for tylenol. Tessalon pearls admin.  
Patient provided a COPD Educational Folder that includes the following materials:     [x]  [employer] Booklet: Managing your COPD  [x]  ALA: Getting the Most Out of Medication Delivery Devices  [x]  ALA: My COPD Action Plan  [x]  Better Breathers Club: [facility]   [x]  Smoking Cessation Classes  [x]  Outpatient Spiritual Care Services  [x]  Magnet: Signs of COPD    PATIENT/CAREGIVER TEACHING:   Level of patient/caregiver understanding able to:   [x] Verbalize understanding   [] Demonstrate understanding       [] Teach back        [] Needs reinforcement     []  Other:     COPD ASSESSMENT TOOL (CAT):   Cough Assessment: 5   Phlegm Assessment: 1   Chest tightness:  4   Walking on an incline: 5   Home Activities: 3   Confident Leaving the Home: 0   Sleeping Soundly: 3   Have Energy: 4   Assessment Score: 25    CAT Score of 25. Patient stated she has never been told she has COPD. Quit smoking 8 years ago. Reviewed resources available at the hospital.     Electronically signed by Carol Samuel RN on 3/8/2024 at 3:15 PM    
Pt awake in bed.  Denies needs. Call light within reach.    
Pulmonary Progress Note  CC: SOB, flu, asthma    Subjective:  on room air  Still c/o non-productive cough and SOB    EXAM: /79   Pulse 85   Temp 97.8 °F (36.6 °C) (Oral)   Resp 20   Ht 1.651 m (5' 5\")   Wt 129.7 kg (286 lb)   SpO2 95%   BMI 47.59 kg/m²  on RA  Constitutional:  No acute distress.   Eyes: PERRL. Conjunctivae anicteric.   ENT: Normal nose. Normal tongue.    Neck:  Trachea is midline.   Respiratory: No accessory muscle usage.   decreased breath sounds. + right wheezes. No rales. No Rhonchi.  Cardiovascular: Normal S1S2. No digit clubbing. No digit cyanosis. No LE edema.   Psychiatric: No anxiety or Agitation. Alert and Oriented to person, place and time.    Scheduled Meds:   benzonatate  200 mg Oral TID    insulin lispro  0-8 Units SubCUTAneous TID WC    insulin lispro  0-4 Units SubCUTAneous Nightly    sodium chloride flush  5-40 mL IntraVENous 2 times per day    methylPREDNISolone  40 mg IntraVENous Q12H    ipratropium 0.5 mg-albuterol 2.5 mg  1 Dose Inhalation Q4H WA RT    azithromycin  500 mg IntraVENous Q24H    oseltamivir  75 mg Oral BID    cetirizine  10 mg Oral Daily    budesonide-formoterol  2 puff Inhalation BID RT    montelukast  10 mg Oral QAM    pantoprazole  40 mg Oral QAM AC    pravastatin  10 mg Oral Daily    enoxaparin  30 mg SubCUTAneous BID     Continuous Infusions:   sodium chloride 100 mL/hr at 03/10/24 0122    sodium chloride      dextrose       PRN Meds:  HYDROcodone homatropine, ipratropium 0.5 mg-albuterol 2.5 mg, sodium chloride flush, sodium chloride, potassium chloride **OR** potassium alternative oral replacement **OR** potassium chloride, magnesium sulfate, ondansetron **OR** ondansetron, polyethylene glycol, acetaminophen **OR** acetaminophen, glucose, dextrose bolus **OR** dextrose bolus, glucagon (rDNA), dextrose, nicotine polacrilex    Labs:  CBC:   Recent Labs     03/07/24  1445 03/08/24  0604   WBC 10.4 12.3*   HGB 13.4 13.1   HCT 41.6 40.8   MCV 77.9* 
Pulmonary Progress Note  CC: SOB, flu, asthma    Subjective:  on room air  Still c/o non-productive cough and SOB    EXAM: BP (!) 151/88   Pulse 99   Temp 97.5 °F (36.4 °C) (Oral)   Resp 20   Ht 1.651 m (5' 5\")   Wt 129.7 kg (286 lb)   SpO2 95%   BMI 47.59 kg/m²  on RA  Constitutional:  No acute distress.   Eyes: PERRL. Conjunctivae anicteric.   ENT: Normal nose. Normal tongue.    Neck:  Trachea is midline.   Respiratory: No accessory muscle usage.   decreased breath sounds. + right wheezes. No rales. No Rhonchi.  Cardiovascular: Normal S1S2. No digit clubbing. No digit cyanosis. No LE edema.   Psychiatric: No anxiety or Agitation. Alert and Oriented to person, place and time.    Scheduled Meds:   benzonatate  200 mg Oral TID    insulin lispro  0-8 Units SubCUTAneous TID WC    insulin lispro  0-4 Units SubCUTAneous Nightly    sodium chloride flush  5-40 mL IntraVENous 2 times per day    methylPREDNISolone  40 mg IntraVENous Q12H    ipratropium 0.5 mg-albuterol 2.5 mg  1 Dose Inhalation Q4H WA RT    azithromycin  500 mg IntraVENous Q24H    oseltamivir  75 mg Oral BID    cetirizine  10 mg Oral Daily    budesonide-formoterol  2 puff Inhalation BID RT    montelukast  10 mg Oral QAM    pantoprazole  40 mg Oral QAM AC    pravastatin  10 mg Oral Daily    enoxaparin  30 mg SubCUTAneous BID     Continuous Infusions:   sodium chloride      dextrose       PRN Meds:  sodium chloride flush, sodium chloride, potassium chloride **OR** potassium alternative oral replacement **OR** potassium chloride, magnesium sulfate, ondansetron **OR** ondansetron, polyethylene glycol, acetaminophen **OR** acetaminophen, glucose, dextrose bolus **OR** dextrose bolus, glucagon (rDNA), dextrose, nicotine polacrilex    Labs:  CBC:   Recent Labs     03/07/24  1445 03/08/24  0604   WBC 10.4 12.3*   HGB 13.4 13.1   HCT 41.6 40.8   MCV 77.9* 77.6*    244       BMP:   Recent Labs     03/07/24  1445 03/08/24  0604    137   K 3.7 4.6 
Pulse oximetry assessment   95% at rest on room air (if 88% or less, skip next steps)  92% while ambulating on room air      
Pulse oximetry assessment   97% at rest on room air (if 88% or less, skip next steps)  94% while ambulating on room air    
RT Inhaler-Nebulizer Bronchodilator Protocol Note    There is a bronchodilator order in the chart from a provider indicating to follow the RT Bronchodilator Protocol and there is an “Initiate RT Inhaler-Nebulizer Bronchodilator Protocol” order as well (see protocol at bottom of note).    CXR Findings:  XR CHEST PORTABLE    Result Date: 3/7/2024  Negative portable chest.       The findings from the last RT Protocol Assessment were as follows:   History Pulmonary Disease: (P) Chronic pulmonary disease  Respiratory Pattern: (P) Dyspnea on exertion or RR 21-25 bpm  Breath Sounds: (P) Intermittent or unilateral wheezes  Cough: (P) Strong, spontaneous, non-productive  Indication for Bronchodilator Therapy: (P) Wheezing associated with pulm disorder  Bronchodilator Assessment Score: (P) 8    Aerosolized bronchodilator medication orders have been revised according to the RT Inhaler-Nebulizer Bronchodilator Protocol below.    Respiratory Therapist to perform RT Therapy Protocol Assessment initially then follow the protocol.  Repeat RT Therapy Protocol Assessment PRN for score 0-3 or on second treatment, BID, and PRN for scores above 3.    No Indications - adjust the frequency to every 6 hours PRN wheezing or bronchospasm, if no treatments needed after 48 hours then discontinue using Per Protocol order mode.     If indication present, adjust the RT bronchodilator orders based on the Bronchodilator Assessment Score as indicated below.  Use Inhaler orders unless patient has one or more of the following: on home nebulizer, not able to hold breath for 10 seconds, is not alert and oriented, cannot activate and use MDI correctly, or respiratory rate 25 breaths per minute or more, then use the equivalent nebulizer order(s) with same Frequency and PRN reasons based on the score.  If a patient is on this medication at home then do not decrease Frequency below that used at home.    0-3 - enter or revise RT bronchodilator order(s) to 
  CREATININE 0.6 <0.5*       Chest imaging was reviewed by me and showed CXR with clear lungs        ASSESSMENT:  Influenza B  Asthma exacerbation  ARJUN on CPAP  Tobacco abuse     PLAN:  Ok for home cpap qhs  Supplemental oxygen to maintain SaO2 >92%; wean as tolerated  Intensive inhaled bronchodilator therapy.  IV solumedrol 40 mg IV Q12 hrs. Plan to switch to oral prednisone taper when improved.    Tamiflu and Azithromycin  Sputum GS&C.  Tessalon pearles  Counseled tobacco cessation > 3 min  Acapella QID to mobilize respiratory secretions        
bronchodilator order(s) to equivalent RT Bronchodilator order with Frequency of every 4 hours PRN for wheezing or increased work of breathing using Per Protocol order mode.        4-6 - enter or revise RT Bronchodilator order(s) to two equivalent RT bronchodilator orders with one order with BID Frequency and one order with Frequency of every 4 hours PRN wheezing or increased work of breathing using Per Protocol order mode.        7-10 - enter or revise RT Bronchodilator order(s) to two equivalent RT bronchodilator orders with one order with TID Frequency and one order with Frequency of every 4 hours PRN wheezing or increased work of breathing using Per Protocol order mode.       11-13 - enter or revise RT Bronchodilator order(s) to one equivalent RT bronchodilator order with QID Frequency and an Albuterol order with Frequency of every 4 hours PRN wheezing or increased work of breathing using Per Protocol order mode.      Greater than 13 - enter or revise RT Bronchodilator order(s) to one equivalent RT bronchodilator order with every 4 hours Frequency and an Albuterol order with Frequency of every 2 hours PRN wheezing or increased work of breathing using Per Protocol order mode.     RT to enter RT Home Evaluation for COPD & MDI Assessment order using Per Protocol order mode.    Electronically signed by Debbie Calzada RCP on 3/8/2024 at 7:54 AM  
every 4 hours PRN for wheezing or increased work of breathing using Per Protocol order mode.        4-6 - enter or revise RT Bronchodilator order(s) to two equivalent RT bronchodilator orders with one order with BID Frequency and one order with Frequency of every 4 hours PRN wheezing or increased work of breathing using Per Protocol order mode.        7-10 - enter or revise RT Bronchodilator order(s) to two equivalent RT bronchodilator orders with one order with TID Frequency and one order with Frequency of every 4 hours PRN wheezing or increased work of breathing using Per Protocol order mode.       11-13 - enter or revise RT Bronchodilator order(s) to one equivalent RT bronchodilator order with QID Frequency and an Albuterol order with Frequency of every 4 hours PRN wheezing or increased work of breathing using Per Protocol order mode.      Greater than 13 - enter or revise RT Bronchodilator order(s) to one equivalent RT bronchodilator order with every 4 hours Frequency and an Albuterol order with Frequency of every 2 hours PRN wheezing or increased work of breathing using Per Protocol order mode.       Electronically signed by Fe Woodard RCP on 3/9/2024 at 7:26 PM  
every 4 hours PRN for wheezing or increased work of breathing using Per Protocol order mode.        4-6 - enter or revise RT Bronchodilator order(s) to two equivalent RT bronchodilator orders with one order with BID Frequency and one order with Frequency of every 4 hours PRN wheezing or increased work of breathing using Per Protocol order mode.        7-10 - enter or revise RT Bronchodilator order(s) to two equivalent RT bronchodilator orders with one order with TID Frequency and one order with Frequency of every 4 hours PRN wheezing or increased work of breathing using Per Protocol order mode.       11-13 - enter or revise RT Bronchodilator order(s) to one equivalent RT bronchodilator order with QID Frequency and an Albuterol order with Frequency of every 4 hours PRN wheezing or increased work of breathing using Per Protocol order mode.      Greater than 13 - enter or revise RT Bronchodilator order(s) to one equivalent RT bronchodilator order with every 4 hours Frequency and an Albuterol order with Frequency of every 2 hours PRN wheezing or increased work of breathing using Per Protocol order mode.     RT to enter RT Home Evaluation for COPD & MDI Assessment order using Per Protocol order mode.    Electronically signed by Adan Dougherty RCP on 3/10/2024 at 8:08 AM  
to equivalent RT Bronchodilator order with Frequency of every 4 hours PRN for wheezing or increased work of breathing using Per Protocol order mode.        4-6 - enter or revise RT Bronchodilator order(s) to two equivalent RT bronchodilator orders with one order with BID Frequency and one order with Frequency of every 4 hours PRN wheezing or increased work of breathing using Per Protocol order mode.        7-10 - enter or revise RT Bronchodilator order(s) to two equivalent RT bronchodilator orders with one order with TID Frequency and one order with Frequency of every 4 hours PRN wheezing or increased work of breathing using Per Protocol order mode.       11-13 - enter or revise RT Bronchodilator order(s) to one equivalent RT bronchodilator order with QID Frequency and an Albuterol order with Frequency of every 4 hours PRN wheezing or increased work of breathing using Per Protocol order mode.      Greater than 13 - enter or revise RT Bronchodilator order(s) to one equivalent RT bronchodilator order with every 4 hours Frequency and an Albuterol order with Frequency of every 2 hours PRN wheezing or increased work of breathing using Per Protocol order mode.     Patient will benefit from treatments.     Electronically signed by Irma Goldsmith RCP on 3/7/2024 at 8:12 PM  
to equivalent RT Bronchodilator order with Frequency of every 4 hours PRN for wheezing or increased work of breathing using Per Protocol order mode.        4-6 - enter or revise RT Bronchodilator order(s) to two equivalent RT bronchodilator orders with one order with BID Frequency and one order with Frequency of every 4 hours PRN wheezing or increased work of breathing using Per Protocol order mode.        7-10 - enter or revise RT Bronchodilator order(s) to two equivalent RT bronchodilator orders with one order with TID Frequency and one order with Frequency of every 4 hours PRN wheezing or increased work of breathing using Per Protocol order mode.       11-13 - enter or revise RT Bronchodilator order(s) to one equivalent RT bronchodilator order with QID Frequency and an Albuterol order with Frequency of every 4 hours PRN wheezing or increased work of breathing using Per Protocol order mode.      Greater than 13 - enter or revise RT Bronchodilator order(s) to one equivalent RT bronchodilator order with every 4 hours Frequency and an Albuterol order with Frequency of every 2 hours PRN wheezing or increased work of breathing using Per Protocol order mode.     Patient will benefit from treatments.     Electronically signed by Irma Goldsmith RCP on 3/8/2024 at 8:01 PM  
Trochanteric bursitis of left hip 09/02/2016    Abnormal CXR     Superior glenoid labrum lesion of right shoulder 01/22/2016    Labral tear of shoulder 01/13/2016    Uncomplicated severe persistent asthma 12/10/2015    Shortness of breath 10/18/2015    Tobacco abuse 08/27/2015    Allergic rhinitis due to allergen 08/27/2015    Glenoid labrum tear 05/19/2014    Glenoid labral tear 02/10/2014       #Influenza B  #COPD/Asthma exacerbation  #Failed outpatient treatment  -multiple rounds of steroids and antibiotics without improvement  -planned bronchoscopy under general anesthesia today but cancelled due to significant wheezing in already high risk patient  -found to have influenza B  -CXR negative  -tamiflu D#2  -azithromycin D#2  -IV solumedrol     #ARJUN  -has CPAP.      #HTN  -BP stable     #DM type 2  -monitor glucose. SSI coverage  -on Trulicity at home.     #Hyperlipidemia  -on Pravastatin     #Obesity, BMI 48.26  -counseled regarding diet modification and weight loss     #Tobacco use  -counseled cessation  -prn nicotine replacement        DVT Prophylaxis: Lovenox  Diet: ADULT DIET; Regular; 4 carb choices (60 gm/meal)  Code Status: Full Code         PARK ZULUAGA MD          
Duoneb or albuterol nebulizer solution at discharge.  Also reports need for glucometer    DVT Prophylaxis: Lovenox  Diet: ADULT DIET; Regular; 4 carb choices (60 gm/meal)  Code Status: Full Code     Tirso Doan MD, 3/9/2024 12:59 PM

## 2024-03-10 NOTE — DISCHARGE INSTR - ACTIVITY
Your information:  Name: Pauline Cordero  : 1980    Your instructions:    Follow up with PCP    What to do after you leave the hospital:    Recommended diet: diabetic diet    Recommended activity: activity as tolerated        The following personal items were collected during your admission and were returned to you:    Belongings  Dental Appliances: Uppers, Lowers  Vision - Corrective Lenses: Eyeglasses  Hearing Aid: None  Clothing: Sent home  Jewelry: None  Electronic Devices: Cell Phone  Weapons (Notify Protective Services/Security): None  Home Medications: None  Valuables Given To: Patient  Provide Name(s) of Who Valuable(s) Were Given To: pt    Information obtained by:  By signing below, I understand that if any problems occur once I leave the hospital I am to contact MD.  I understand and acknowledge receipt of the instructions indicated above.

## 2024-03-10 NOTE — PLAN OF CARE
Problem: Discharge Planning  Goal: Discharge to home or other facility with appropriate resources  3/10/2024 1136 by Kandis Ortiz RN  Outcome: Adequate for Discharge  3/10/2024 1041 by Kandis Ortiz RN  Outcome: Progressing  3/10/2024 0010 by Katlyn Stevenson RN  Outcome: Progressing     Problem: Safety - Adult  Goal: Free from fall injury  3/10/2024 1136 by Kandis Ortiz RN  Outcome: Adequate for Discharge  3/10/2024 1041 by Kandis Ortiz RN  Outcome: Progressing  3/10/2024 0010 by Katlyn Stevenson RN  Outcome: Progressing     Problem: Respiratory - Adult  Goal: Achieves optimal ventilation and oxygenation  3/10/2024 1136 by Kandis Ortiz RN  Outcome: Adequate for Discharge  3/10/2024 1041 by Kandis Ortiz RN  Outcome: Progressing  3/10/2024 0010 by Katlyn Stevenson RN  Outcome: Progressing     Problem: Chronic Conditions and Co-morbidities  Goal: Patient's chronic conditions and co-morbidity symptoms are monitored and maintained or improved  3/10/2024 1136 by Kandis Ortiz RN  Outcome: Adequate for Discharge  3/10/2024 1041 by Kandis Ortiz RN  Outcome: Progressing  3/10/2024 0010 by Katlyn Stevenson RN  Outcome: Progressing     Problem: Pain  Goal: Verbalizes/displays adequate comfort level or baseline comfort level  3/10/2024 1136 by Kandis Ortiz RN  Outcome: Adequate for Discharge  3/10/2024 1041 by Kandis Ortiz RN  Outcome: Progressing  3/10/2024 0010 by Katlyn Stevenson RN  Outcome: Progressing

## 2024-03-10 NOTE — PLAN OF CARE
Problem: Discharge Planning  Goal: Discharge to home or other facility with appropriate resources  3/10/2024 0010 by Katlyn Stevenson RN  Outcome: Progressing     Problem: Safety - Adult  Goal: Free from fall injury  3/10/2024 0010 by Katlyn Stevenson RN  Outcome: Progressing     Problem: Respiratory - Adult  Goal: Achieves optimal ventilation and oxygenation  3/10/2024 0010 by Katlyn Stevenson RN  Outcome: Progressing     Problem: Chronic Conditions and Co-morbidities  Goal: Patient's chronic conditions and co-morbidity symptoms are monitored and maintained or improved  3/10/2024 0010 by Katlyn Stevenson RN  Outcome: Progressing     Problem: Pain  Goal: Verbalizes/displays adequate comfort level or baseline comfort level  3/10/2024 0010 by Katlyn Stevenson RN  Outcome: Progressing

## 2024-03-10 NOTE — CARE COORDINATION
Order for dc noted. Spoke with pt who cont plan to return home. Noted pt on RA with sat 92% while ambulating. Discussed HHC and pt declines. Chart reviewed and no other dc needs identified.     Approached by nsg that pt calling stating unable to obtain Albuterol and it required prior auth.    Spoke with [facility] in [place] and the pharm confirms need for prior auth. Spoke with Dr. Lutz re: albuterol dose and Dr Bolivar kwan change does to 0.083% albuterol.  Spoke with Pharmisist at [facility] and called in  Albuterol neb solu 0.083% Q 6 hours as needed for wheezing verbal order from Dr. Lutz. Pt at pharmacy and will obtain albuteral . 0.083% does not require prior auth.

## 2024-03-10 NOTE — PLAN OF CARE
Problem: Discharge Planning  Goal: Discharge to home or other facility with appropriate resources  3/10/2024 1041 by Kandis Ortiz RN  Outcome: Progressing  3/10/2024 0010 by Katlyn Stevenson RN  Outcome: Progressing     Problem: Safety - Adult  Goal: Free from fall injury  3/10/2024 1041 by Kandis Ortiz RN  Outcome: Progressing  3/10/2024 0010 by Katlyn Stevenson RN  Outcome: Progressing     Problem: Respiratory - Adult  Goal: Achieves optimal ventilation and oxygenation  3/10/2024 1041 by Kandsi Ortiz RN  Outcome: Progressing  3/10/2024 0010 by Katlyn Stevenson RN  Outcome: Progressing     Problem: Chronic Conditions and Co-morbidities  Goal: Patient's chronic conditions and co-morbidity symptoms are monitored and maintained or improved  3/10/2024 1041 by Kandis Ortiz RN  Outcome: Progressing  3/10/2024 0010 by Katlyn Stevenson RN  Outcome: Progressing     Problem: Pain  Goal: Verbalizes/displays adequate comfort level or baseline comfort level  3/10/2024 1041 by Kandis Ortiz RN  Outcome: Progressing  3/10/2024 0010 by Katlyn Stevenson RN  Outcome: Progressing

## 2024-03-11 LAB
EST. AVERAGE GLUCOSE BLD GHB EST-MCNC: 145.6 MG/DL
HBA1C MFR BLD: 6.7 %

## 2024-03-12 ENCOUNTER — APPOINTMENT (OUTPATIENT)
Dept: CT IMAGING | Age: 44
End: 2024-03-12
Payer: COMMERCIAL

## 2024-03-12 ENCOUNTER — APPOINTMENT (OUTPATIENT)
Dept: VASCULAR LAB | Age: 44
End: 2024-03-12
Payer: COMMERCIAL

## 2024-03-12 ENCOUNTER — APPOINTMENT (OUTPATIENT)
Dept: GENERAL RADIOLOGY | Age: 44
End: 2024-03-12
Payer: COMMERCIAL

## 2024-03-12 ENCOUNTER — HOSPITAL ENCOUNTER (INPATIENT)
Age: 44
LOS: 3 days | Discharge: HOME | End: 2024-03-15
Attending: EMERGENCY MEDICINE | Admitting: INTERNAL MEDICINE
Payer: COMMERCIAL

## 2024-03-12 DIAGNOSIS — R07.9 CHEST PAIN, UNSPECIFIED TYPE: ICD-10-CM

## 2024-03-12 DIAGNOSIS — J10.1 INFLUENZA B: ICD-10-CM

## 2024-03-12 DIAGNOSIS — I26.99 ACUTE PULMONARY EMBOLISM WITHOUT ACUTE COR PULMONALE, UNSPECIFIED PULMONARY EMBOLISM TYPE (HCC): Primary | ICD-10-CM

## 2024-03-12 PROBLEM — G47.33 OSA (OBSTRUCTIVE SLEEP APNEA): Status: ACTIVE | Noted: 2024-03-12

## 2024-03-12 PROBLEM — E11.9 T2DM (TYPE 2 DIABETES MELLITUS) (HCC): Status: ACTIVE | Noted: 2024-03-12

## 2024-03-12 PROBLEM — E78.5 HLD (HYPERLIPIDEMIA): Status: ACTIVE | Noted: 2024-03-12

## 2024-03-12 PROBLEM — R00.0 TACHYCARDIA: Status: ACTIVE | Noted: 2024-03-12

## 2024-03-12 PROBLEM — E87.6 HYPOKALEMIA: Status: ACTIVE | Noted: 2024-03-12

## 2024-03-12 PROBLEM — J44.9 COPD (CHRONIC OBSTRUCTIVE PULMONARY DISEASE) (HCC): Status: ACTIVE | Noted: 2024-03-12

## 2024-03-12 PROBLEM — D72.829 LEUKOCYTOSIS: Status: ACTIVE | Noted: 2024-03-12

## 2024-03-12 PROBLEM — R00.0 SINUS TACHYCARDIA: Status: ACTIVE | Noted: 2024-03-12

## 2024-03-12 LAB
ALBUMIN SERPL-MCNC: 3.9 G/DL (ref 3.4–5)
ALBUMIN/GLOB SERPL: 1.4 {RATIO} (ref 1.1–2.2)
ALP SERPL-CCNC: 85 U/L (ref 40–129)
ALT SERPL-CCNC: 24 U/L (ref 10–40)
ANION GAP SERPL CALCULATED.3IONS-SCNC: 12 MMOL/L (ref 3–16)
ANISOCYTOSIS BLD QL SMEAR: ABNORMAL
AST SERPL-CCNC: 13 U/L (ref 15–37)
BASOPHILS # BLD: 0 K/UL (ref 0–0.2)
BASOPHILS # BLD: 0.1 K/UL (ref 0–0.2)
BASOPHILS NFR BLD: 0 %
BASOPHILS NFR BLD: 0.9 %
BILIRUB SERPL-MCNC: 0.5 MG/DL (ref 0–1)
BILIRUB UR QL STRIP.AUTO: NEGATIVE
BUN SERPL-MCNC: 21 MG/DL (ref 7–20)
CALCIUM SERPL-MCNC: 9 MG/DL (ref 8.3–10.6)
CHLORIDE SERPL-SCNC: 98 MMOL/L (ref 99–110)
CLARITY UR: CLEAR
CO2 SERPL-SCNC: 26 MMOL/L (ref 21–32)
COLOR UR: YELLOW
CREAT SERPL-MCNC: 0.6 MG/DL (ref 0.6–1.1)
DEPRECATED RDW RBC AUTO: 17.4 % (ref 12.4–15.4)
DEPRECATED RDW RBC AUTO: 17.5 % (ref 12.4–15.4)
EKG ATRIAL RATE: 124 BPM
EKG DIAGNOSIS: NORMAL
EKG P AXIS: 75 DEGREES
EKG P-R INTERVAL: 130 MS
EKG Q-T INTERVAL: 322 MS
EKG QRS DURATION: 78 MS
EKG QTC CALCULATION (BAZETT): 462 MS
EKG R AXIS: -20 DEGREES
EKG T AXIS: 55 DEGREES
EKG VENTRICULAR RATE: 124 BPM
EOSINOPHIL # BLD: 0 K/UL (ref 0–0.6)
EOSINOPHIL # BLD: 0.1 K/UL (ref 0–0.6)
EOSINOPHIL NFR BLD: 0 %
EOSINOPHIL NFR BLD: 0.9 %
FLUAV RNA RESP QL NAA+PROBE: NOT DETECTED
FLUBV RNA RESP QL NAA+PROBE: DETECTED
GFR SERPLBLD CREATININE-BSD FMLA CKD-EPI: >60 ML/MIN/{1.73_M2}
GLUCOSE BLD-MCNC: 134 MG/DL (ref 70–99)
GLUCOSE BLD-MCNC: 145 MG/DL (ref 70–99)
GLUCOSE BLD-MCNC: 95 MG/DL (ref 70–99)
GLUCOSE SERPL-MCNC: 150 MG/DL (ref 70–99)
GLUCOSE UR STRIP.AUTO-MCNC: NEGATIVE MG/DL
HCT VFR BLD AUTO: 42.8 % (ref 36–48)
HCT VFR BLD AUTO: 43.2 % (ref 36–48)
HGB BLD-MCNC: 13.7 G/DL (ref 12–16)
HGB BLD-MCNC: 14 G/DL (ref 12–16)
HGB UR QL STRIP.AUTO: NEGATIVE
HYPOCHROMIA BLD QL SMEAR: ABNORMAL
KETONES UR STRIP.AUTO-MCNC: NEGATIVE MG/DL
LACTATE BLDV-SCNC: 1.7 MMOL/L (ref 0.4–1.9)
LEUKOCYTE ESTERASE UR QL STRIP.AUTO: NEGATIVE
LYMPHOCYTES # BLD: 3.5 K/UL (ref 1–5.1)
LYMPHOCYTES # BLD: 3.6 K/UL (ref 1–5.1)
LYMPHOCYTES NFR BLD: 26.2 %
LYMPHOCYTES NFR BLD: 28 %
MAGNESIUM SERPL-MCNC: 1.8 MG/DL (ref 1.8–2.4)
MCH RBC QN AUTO: 24.9 PG (ref 26–34)
MCH RBC QN AUTO: 25.2 PG (ref 26–34)
MCHC RBC AUTO-ENTMCNC: 31.8 G/DL (ref 31–36)
MCHC RBC AUTO-ENTMCNC: 32.7 G/DL (ref 31–36)
MCV RBC AUTO: 77.2 FL (ref 80–100)
MCV RBC AUTO: 78.2 FL (ref 80–100)
MONOCYTES # BLD: 1 K/UL (ref 0–1.3)
MONOCYTES # BLD: 1.2 K/UL (ref 0–1.3)
MONOCYTES NFR BLD: 7.2 %
MONOCYTES NFR BLD: 9 %
NEUTROPHILS # BLD: 8.1 K/UL (ref 1.7–7.7)
NEUTROPHILS # BLD: 8.7 K/UL (ref 1.7–7.7)
NEUTROPHILS NFR BLD: 63 %
NEUTROPHILS NFR BLD: 64.8 %
NITRITE UR QL STRIP.AUTO: NEGATIVE
PATH INTERP BLD-IMP: NO
PERFORMED ON: ABNORMAL
PERFORMED ON: ABNORMAL
PERFORMED ON: NORMAL
PH UR STRIP.AUTO: 6 [PH] (ref 5–8)
PLATELET # BLD AUTO: 227 K/UL (ref 135–450)
PLATELET # BLD AUTO: 243 K/UL (ref 135–450)
PLATELET BLD QL SMEAR: ADEQUATE
PMV BLD AUTO: 8.5 FL (ref 5–10.5)
PMV BLD AUTO: 8.7 FL (ref 5–10.5)
POTASSIUM SERPL-SCNC: 3.4 MMOL/L (ref 3.5–5.1)
PROT SERPL-MCNC: 6.6 G/DL (ref 6.4–8.2)
PROT UR STRIP.AUTO-MCNC: NEGATIVE MG/DL
RBC # BLD AUTO: 5.52 M/UL (ref 4–5.2)
RBC # BLD AUTO: 5.54 M/UL (ref 4–5.2)
SARS-COV-2 RNA RESP QL NAA+PROBE: NOT DETECTED
SLIDE REVIEW: ABNORMAL
SODIUM SERPL-SCNC: 136 MMOL/L (ref 136–145)
SP GR UR STRIP.AUTO: 1.02 (ref 1–1.03)
TROPONIN, HIGH SENSITIVITY: 11 NG/L (ref 0–14)
TROPONIN, HIGH SENSITIVITY: 12 NG/L (ref 0–14)
UA COMPLETE W REFLEX CULTURE PNL UR: NORMAL
UA DIPSTICK W REFLEX MICRO PNL UR: NORMAL
URN SPEC COLLECT METH UR: NORMAL
UROBILINOGEN UR STRIP-ACNC: 0.2 E.U./DL
WBC # BLD AUTO: 12.9 K/UL (ref 4–11)
WBC # BLD AUTO: 13.4 K/UL (ref 4–11)

## 2024-03-12 PROCEDURE — 84484 ASSAY OF TROPONIN QUANT: CPT

## 2024-03-12 PROCEDURE — 94640 AIRWAY INHALATION TREATMENT: CPT

## 2024-03-12 PROCEDURE — 80053 COMPREHEN METABOLIC PANEL: CPT

## 2024-03-12 PROCEDURE — 2580000003 HC RX 258: Performed by: EMERGENCY MEDICINE

## 2024-03-12 PROCEDURE — 99223 1ST HOSP IP/OBS HIGH 75: CPT | Performed by: INTERNAL MEDICINE

## 2024-03-12 PROCEDURE — 6370000000 HC RX 637 (ALT 250 FOR IP)

## 2024-03-12 PROCEDURE — 6360000002 HC RX W HCPCS: Performed by: EMERGENCY MEDICINE

## 2024-03-12 PROCEDURE — 87636 SARSCOV2 & INF A&B AMP PRB: CPT

## 2024-03-12 PROCEDURE — 99285 EMERGENCY DEPT VISIT HI MDM: CPT

## 2024-03-12 PROCEDURE — 93970 EXTREMITY STUDY: CPT

## 2024-03-12 PROCEDURE — 2580000003 HC RX 258

## 2024-03-12 PROCEDURE — 36415 COLL VENOUS BLD VENIPUNCTURE: CPT

## 2024-03-12 PROCEDURE — 2580000003 HC RX 258: Performed by: INTERNAL MEDICINE

## 2024-03-12 PROCEDURE — 6370000000 HC RX 637 (ALT 250 FOR IP): Performed by: INTERNAL MEDICINE

## 2024-03-12 PROCEDURE — 99255 IP/OBS CONSLTJ NEW/EST HI 80: CPT | Performed by: INTERNAL MEDICINE

## 2024-03-12 PROCEDURE — 70450 CT HEAD/BRAIN W/O DYE: CPT

## 2024-03-12 PROCEDURE — 93005 ELECTROCARDIOGRAM TRACING: CPT | Performed by: EMERGENCY MEDICINE

## 2024-03-12 PROCEDURE — 81003 URINALYSIS AUTO W/O SCOPE: CPT

## 2024-03-12 PROCEDURE — 71045 X-RAY EXAM CHEST 1 VIEW: CPT

## 2024-03-12 PROCEDURE — 87040 BLOOD CULTURE FOR BACTERIA: CPT

## 2024-03-12 PROCEDURE — 6360000004 HC RX CONTRAST MEDICATION: Performed by: EMERGENCY MEDICINE

## 2024-03-12 PROCEDURE — 85025 COMPLETE CBC W/AUTO DIFF WBC: CPT

## 2024-03-12 PROCEDURE — 2060000000 HC ICU INTERMEDIATE R&B

## 2024-03-12 PROCEDURE — 93010 ELECTROCARDIOGRAM REPORT: CPT | Performed by: INTERNAL MEDICINE

## 2024-03-12 PROCEDURE — 71260 CT THORAX DX C+: CPT

## 2024-03-12 PROCEDURE — 83605 ASSAY OF LACTIC ACID: CPT

## 2024-03-12 PROCEDURE — 83735 ASSAY OF MAGNESIUM: CPT

## 2024-03-12 RX ORDER — ENOXAPARIN SODIUM 150 MG/ML
1 INJECTION SUBCUTANEOUS ONCE
Status: COMPLETED | OUTPATIENT
Start: 2024-03-12 | End: 2024-03-12

## 2024-03-12 RX ORDER — POLYETHYLENE GLYCOL 3350 17 G/17G
17 POWDER, FOR SOLUTION ORAL DAILY PRN
Status: DISCONTINUED | OUTPATIENT
Start: 2024-03-12 | End: 2024-03-15 | Stop reason: HOSPADM

## 2024-03-12 RX ORDER — INSULIN LISPRO 100 [IU]/ML
0-4 INJECTION, SOLUTION INTRAVENOUS; SUBCUTANEOUS NIGHTLY
Status: DISCONTINUED | OUTPATIENT
Start: 2024-03-12 | End: 2024-03-15 | Stop reason: HOSPADM

## 2024-03-12 RX ORDER — METHOCARBAMOL 750 MG/1
750 TABLET, FILM COATED ORAL PRN
COMMUNITY
Start: 2023-12-28

## 2024-03-12 RX ORDER — GLUCAGON 1 MG/ML
1 KIT INJECTION PRN
Status: DISCONTINUED | OUTPATIENT
Start: 2024-03-12 | End: 2024-03-15 | Stop reason: HOSPADM

## 2024-03-12 RX ORDER — PRAVASTATIN SODIUM 10 MG
10 TABLET ORAL DAILY
Status: DISCONTINUED | OUTPATIENT
Start: 2024-03-12 | End: 2024-03-15 | Stop reason: HOSPADM

## 2024-03-12 RX ORDER — INSULIN LISPRO 100 [IU]/ML
0-4 INJECTION, SOLUTION INTRAVENOUS; SUBCUTANEOUS
Status: DISCONTINUED | OUTPATIENT
Start: 2024-03-12 | End: 2024-03-15 | Stop reason: HOSPADM

## 2024-03-12 RX ORDER — POTASSIUM CHLORIDE 20 MEQ/1
40 TABLET, EXTENDED RELEASE ORAL PRN
Status: DISCONTINUED | OUTPATIENT
Start: 2024-03-12 | End: 2024-03-15 | Stop reason: HOSPADM

## 2024-03-12 RX ORDER — AZITHROMYCIN 250 MG/1
250 TABLET, FILM COATED ORAL DAILY
Status: COMPLETED | OUTPATIENT
Start: 2024-03-12 | End: 2024-03-14

## 2024-03-12 RX ORDER — ALBUTEROL SULFATE 2.5 MG/3ML
2.5 SOLUTION RESPIRATORY (INHALATION)
Status: DISCONTINUED | OUTPATIENT
Start: 2024-03-13 | End: 2024-03-15 | Stop reason: HOSPADM

## 2024-03-12 RX ORDER — SODIUM CHLORIDE 9 MG/ML
INJECTION, SOLUTION INTRAVENOUS PRN
Status: DISCONTINUED | OUTPATIENT
Start: 2024-03-12 | End: 2024-03-15 | Stop reason: HOSPADM

## 2024-03-12 RX ORDER — ENOXAPARIN SODIUM 150 MG/ML
1 INJECTION SUBCUTANEOUS 2 TIMES DAILY
Status: DISCONTINUED | OUTPATIENT
Start: 2024-03-13 | End: 2024-03-13

## 2024-03-12 RX ORDER — ALBUTEROL SULFATE 2.5 MG/3ML
3 SOLUTION RESPIRATORY (INHALATION) EVERY 6 HOURS PRN
Status: DISCONTINUED | OUTPATIENT
Start: 2024-03-12 | End: 2024-03-12

## 2024-03-12 RX ORDER — 0.9 % SODIUM CHLORIDE 0.9 %
1000 INTRAVENOUS SOLUTION INTRAVENOUS ONCE
Status: COMPLETED | OUTPATIENT
Start: 2024-03-12 | End: 2024-03-12

## 2024-03-12 RX ORDER — ONDANSETRON 2 MG/ML
4 INJECTION INTRAMUSCULAR; INTRAVENOUS EVERY 6 HOURS PRN
Status: DISCONTINUED | OUTPATIENT
Start: 2024-03-12 | End: 2024-03-15 | Stop reason: HOSPADM

## 2024-03-12 RX ORDER — BENZONATATE 100 MG/1
200 CAPSULE ORAL 3 TIMES DAILY
Status: DISCONTINUED | OUTPATIENT
Start: 2024-03-12 | End: 2024-03-15 | Stop reason: HOSPADM

## 2024-03-12 RX ORDER — POTASSIUM CHLORIDE 7.45 MG/ML
10 INJECTION INTRAVENOUS PRN
Status: DISCONTINUED | OUTPATIENT
Start: 2024-03-12 | End: 2024-03-15 | Stop reason: HOSPADM

## 2024-03-12 RX ORDER — HYDROCODONE BITARTRATE AND HOMATROPINE METHYLBROMIDE ORAL SOLUTION 5; 1.5 MG/5ML; MG/5ML
5 LIQUID ORAL EVERY 6 HOURS PRN
Status: DISCONTINUED | OUTPATIENT
Start: 2024-03-12 | End: 2024-03-15 | Stop reason: HOSPADM

## 2024-03-12 RX ORDER — ACETAMINOPHEN 325 MG/1
650 TABLET ORAL EVERY 6 HOURS PRN
Status: DISCONTINUED | OUTPATIENT
Start: 2024-03-12 | End: 2024-03-15 | Stop reason: HOSPADM

## 2024-03-12 RX ORDER — SODIUM CHLORIDE 0.9 % (FLUSH) 0.9 %
5-40 SYRINGE (ML) INJECTION EVERY 12 HOURS SCHEDULED
Status: DISCONTINUED | OUTPATIENT
Start: 2024-03-12 | End: 2024-03-15 | Stop reason: HOSPADM

## 2024-03-12 RX ORDER — DEXTROSE MONOHYDRATE 100 MG/ML
INJECTION, SOLUTION INTRAVENOUS CONTINUOUS PRN
Status: DISCONTINUED | OUTPATIENT
Start: 2024-03-12 | End: 2024-03-15 | Stop reason: HOSPADM

## 2024-03-12 RX ORDER — PANTOPRAZOLE SODIUM 40 MG/1
40 TABLET, DELAYED RELEASE ORAL
Status: DISCONTINUED | OUTPATIENT
Start: 2024-03-13 | End: 2024-03-15 | Stop reason: HOSPADM

## 2024-03-12 RX ORDER — FLUTICASONE PROPIONATE 110 UG/1
2 AEROSOL, METERED RESPIRATORY (INHALATION)
Status: DISCONTINUED | OUTPATIENT
Start: 2024-03-12 | End: 2024-03-15 | Stop reason: HOSPADM

## 2024-03-12 RX ORDER — SODIUM CHLORIDE 9 MG/ML
INJECTION, SOLUTION INTRAVENOUS CONTINUOUS
Status: DISCONTINUED | OUTPATIENT
Start: 2024-03-12 | End: 2024-03-14

## 2024-03-12 RX ORDER — ACETAMINOPHEN 650 MG/1
650 SUPPOSITORY RECTAL EVERY 6 HOURS PRN
Status: DISCONTINUED | OUTPATIENT
Start: 2024-03-12 | End: 2024-03-15 | Stop reason: HOSPADM

## 2024-03-12 RX ORDER — CETIRIZINE HYDROCHLORIDE 10 MG/1
10 TABLET ORAL DAILY
Status: DISCONTINUED | OUTPATIENT
Start: 2024-03-12 | End: 2024-03-15 | Stop reason: HOSPADM

## 2024-03-12 RX ORDER — OSELTAMIVIR PHOSPHATE 75 MG/1
75 CAPSULE ORAL 2 TIMES DAILY
Status: DISCONTINUED | OUTPATIENT
Start: 2024-03-12 | End: 2024-03-15 | Stop reason: HOSPADM

## 2024-03-12 RX ORDER — MONTELUKAST SODIUM 10 MG/1
10 TABLET ORAL
Status: DISCONTINUED | OUTPATIENT
Start: 2024-03-12 | End: 2024-03-15 | Stop reason: HOSPADM

## 2024-03-12 RX ORDER — BUDESONIDE AND FORMOTEROL FUMARATE DIHYDRATE 160; 4.5 UG/1; UG/1
2 AEROSOL RESPIRATORY (INHALATION)
Status: DISCONTINUED | OUTPATIENT
Start: 2024-03-12 | End: 2024-03-15 | Stop reason: HOSPADM

## 2024-03-12 RX ORDER — SODIUM CHLORIDE 0.9 % (FLUSH) 0.9 %
5-40 SYRINGE (ML) INJECTION PRN
Status: DISCONTINUED | OUTPATIENT
Start: 2024-03-12 | End: 2024-03-15 | Stop reason: HOSPADM

## 2024-03-12 RX ORDER — MAGNESIUM SULFATE IN WATER 40 MG/ML
2000 INJECTION, SOLUTION INTRAVENOUS PRN
Status: DISCONTINUED | OUTPATIENT
Start: 2024-03-12 | End: 2024-03-15 | Stop reason: HOSPADM

## 2024-03-12 RX ORDER — ALBUTEROL SULFATE 2.5 MG/3ML
3 SOLUTION RESPIRATORY (INHALATION) EVERY 4 HOURS PRN
Status: DISCONTINUED | OUTPATIENT
Start: 2024-03-12 | End: 2024-03-15 | Stop reason: HOSPADM

## 2024-03-12 RX ORDER — ONDANSETRON 4 MG/1
4 TABLET, ORALLY DISINTEGRATING ORAL EVERY 8 HOURS PRN
Status: DISCONTINUED | OUTPATIENT
Start: 2024-03-12 | End: 2024-03-15 | Stop reason: HOSPADM

## 2024-03-12 RX ORDER — POTASSIUM CHLORIDE 20 MEQ/1
40 TABLET, EXTENDED RELEASE ORAL ONCE
Status: COMPLETED | OUTPATIENT
Start: 2024-03-12 | End: 2024-03-12

## 2024-03-12 RX ADMIN — BENZONATATE 200 MG: 100 CAPSULE ORAL at 19:24

## 2024-03-12 RX ADMIN — AZITHROMYCIN DIHYDRATE 250 MG: 250 TABLET ORAL at 19:24

## 2024-03-12 RX ADMIN — MONTELUKAST SODIUM 10 MG: 10 TABLET, COATED ORAL at 19:24

## 2024-03-12 RX ADMIN — PRAVASTATIN SODIUM 10 MG: 10 TABLET ORAL at 20:27

## 2024-03-12 RX ADMIN — HYDROCODONE BITARTRATE AND HOMATROPINE METHYLBROMIDE 5 ML: 5; 1.5 SOLUTION ORAL at 19:24

## 2024-03-12 RX ADMIN — SODIUM CHLORIDE: 9 INJECTION, SOLUTION INTRAVENOUS at 18:22

## 2024-03-12 RX ADMIN — SODIUM CHLORIDE 1000 ML: 9 INJECTION, SOLUTION INTRAVENOUS at 14:38

## 2024-03-12 RX ADMIN — OSELTAMIVIR PHOSPHATE 75 MG: 75 CAPSULE ORAL at 19:24

## 2024-03-12 RX ADMIN — PREDNISONE 30 MG: 20 TABLET ORAL at 20:27

## 2024-03-12 RX ADMIN — IOPAMIDOL 75 ML: 755 INJECTION, SOLUTION INTRAVENOUS at 14:26

## 2024-03-12 RX ADMIN — CETIRIZINE HYDROCHLORIDE 10 MG: 10 TABLET ORAL at 19:24

## 2024-03-12 RX ADMIN — ACETAMINOPHEN 650 MG: 325 TABLET ORAL at 19:24

## 2024-03-12 RX ADMIN — POTASSIUM CHLORIDE 40 MEQ: 1500 TABLET, EXTENDED RELEASE ORAL at 18:22

## 2024-03-12 RX ADMIN — ENOXAPARIN SODIUM 135 MG: 150 INJECTION SUBCUTANEOUS at 16:21

## 2024-03-12 RX ADMIN — SODIUM CHLORIDE, PRESERVATIVE FREE 10 ML: 5 INJECTION INTRAVENOUS at 19:25

## 2024-03-12 RX ADMIN — Medication 2 PUFF: at 19:50

## 2024-03-12 ASSESSMENT — PAIN DESCRIPTION - PAIN TYPE
TYPE: ACUTE PAIN
TYPE: ACUTE PAIN

## 2024-03-12 ASSESSMENT — PAIN DESCRIPTION - LOCATION
LOCATION: CHEST
LOCATION: HEAD

## 2024-03-12 ASSESSMENT — PAIN DESCRIPTION - ORIENTATION: ORIENTATION: MID

## 2024-03-12 ASSESSMENT — PAIN SCALES - GENERAL
PAINLEVEL_OUTOF10: 6
PAINLEVEL_OUTOF10: 5

## 2024-03-12 ASSESSMENT — PAIN - FUNCTIONAL ASSESSMENT: PAIN_FUNCTIONAL_ASSESSMENT: 0-10

## 2024-03-12 ASSESSMENT — LIFESTYLE VARIABLES
HOW OFTEN DO YOU HAVE A DRINK CONTAINING ALCOHOL: NEVER
HOW MANY STANDARD DRINKS CONTAINING ALCOHOL DO YOU HAVE ON A TYPICAL DAY: PATIENT DOES NOT DRINK

## 2024-03-12 ASSESSMENT — PAIN DESCRIPTION - DESCRIPTORS: DESCRIPTORS: PRESSURE

## 2024-03-12 NOTE — PROGRESS NOTES
Consult has been called to Dr. Galeana on 3/12/24. Spoke with Lucretia. 5:31 PM    Namrata Gale  3/12/2024

## 2024-03-12 NOTE — ED NOTES
1549-Perfect Serve sent by Dr. Garcia to Dr. Doan Hospitalist for consult.   1555-Call back received from Hospitalist Chidi Nielsen NP spoke with Dr. Garcia.  160-Consult completed to Hospitalist admission orders placed by Chidi Nielsen NP.

## 2024-03-12 NOTE — ED PROVIDER NOTES
[facility] ED      CHIEF COMPLAINT  Chest Pain (Pt was recently admitted with flu and bronchitis; pt states 1hr pta she started having left sided chest pressure; with right hand numbness and weakness, vision went blurry; and she could not focus vision;)       HISTORY OF PRESENT ILLNESS  Pauline Cordero is a 43 y.o. female  who presents to the ED complaining of chest pain.  Patient states that it goes across her chest.  It is worse when she coughs.  Is a pressure-like feeling.  She states that she is also been having some blurred vision today where it is very difficult to focus.  No diplopia.  She feels lightheaded as if she is going to pass out.  She feels pressure in her sinus area.  She states that she also felt like it was hard to make a fist with her right hand.  She was recently admitted with influenza with COPD exacerbation.  She was discharged with cough suppressant and steroid taper but was on supplemental oxygen during her hospitalization but that was able to be weaned.    No other complaints, modifying factors or associated symptoms.     I have reviewed the following from the nursing documentation.    Past Medical History:   Diagnosis Date    Anesthesia complication     severe hypotension with block    Anxiety and depression     Arthritis     Asthma     Chronic bronchitis (HCC)     Fibromyalgia     Hypertension     Migraine     Pneumonia     Rash     Reflux     Sleep apnea     Wears glasses      Past Surgical History:   Procedure Laterality Date    ABDOMEN SURGERY      BRONCHOSCOPY  15     SECTION      x 2    COLONOSCOPY  2013    normal per pt    CYST REMOVAL      DENTAL SURGERY      teeth removed    ENDOSCOPY, COLON, DIAGNOSTIC      ESOPHAGEAL DILATATION      GALLBLADDER SURGERY      GASTRIC FUNDOPLICATION  3/2016    HYSTEROSCOPY  09/10/2015    Hysteroscopy, dilation and curettage with the MyoSureLuca    OTHER SURGICAL HISTORY  9/10/15    HYSTEROSCOPY, DILATATION AND  cp TECHNOLOGIST PROVIDED HISTORY: Reason for exam:->cp Reason for Exam: Chest pain FINDINGS: There is eventration of the right hemidiaphragm, unchanged.  The lungs appear otherwise clear.  The heart appears unremarkable.  Bony structures appear normal.     No acute cardiopulmonary process.     XR CHEST PORTABLE    Result Date: 3/7/2024  EXAMINATION: ONE XRAY VIEW OF THE CHEST 3/7/2024 2:34 pm COMPARISON: Chest radiograph dated 02/28/2024 HISTORY: ORDERING SYSTEM PROVIDED HISTORY: wheezing, cough TECHNOLOGIST PROVIDED HISTORY: Reason for exam:->wheezing, cough Reason for Exam: wheezing/coughing FINDINGS: Medical devices: None. Mediastinum/Heart: The mediastinal contours are unchanged compared to prior exam. Lungs: The right hemidiaphragm remains elevated.  No developing consolidation. Pleura: No findings to suggest pneumothorax or large pleural effusion. Bones/Soft tissues: Nothing acute.     Negative portable chest.     XR CHEST (2 VW)    Result Date: 2/28/2024  EXAMINATION: TWO XRAY VIEWS OF THE CHEST 2/28/2024 12:55 pm COMPARISON: None. HISTORY: ORDERING SYSTEM PROVIDED HISTORY: Uncomplicated asthma, unspecified asthma severity, unspecified whether persistent TECHNOLOGIST PROVIDED HISTORY: Reason for Exam: asthma, sob worsening concern for pneumonia FINDINGS: The lungs are without acute focal process.  There is no effusion or pneumothorax. The cardiomediastinal silhouette is without acute process. The osseous structures are without acute process.     No acute process.        Point of care ultrasound  No results found.     ED COURSE/MDM  Patient presenting with chest discomfort but also felt like maybe she had had some right-sided upper extremity either weakness or difficulty utilizing that arm associated with lightheadedness and feeling she was going to pass out with difficulty focusing her vision but no diplopia on exam her NIH stroke scale is 0.  She describes near syncope and that certainly may have contributed

## 2024-03-12 NOTE — PROGRESS NOTES
Admission assessment completed. VSS. Pt a/Ox4. C/o headache and cough. Scheduled meds given. PRN Tylenol and Hycodan given per pt request. See MAR. Pt denies further needs. Call light within reach. Bed side report given to PHI June.

## 2024-03-12 NOTE — CONSULTS
Pulmonary & Critical Care Consultation Note    Patient is being seen at the request of Tirso Doan MD  for a consultation for PE    HISTORY OF PRESENT ILLNESS:   43 years old with history of asthma recently admitted treated for flu bronchitis presented with chest pain.  Duration for few days.  Mild to moderate.  Associated with palpitation.  Not worse with taking deep breath.  CT chest done 3/12/2024 imaging reviewed by me and showed lobar segmental pulmonary emboli in the right lower lobe.  Small.  No associated consolidation or infarction.  No associated right heart strain.  Not sure what makes it better or worse.  No prior history of PE.  Brother had history of DVT.  No history of malignancy.  No hormonal medication use.  No recent travel.  Recent admission for influenza and asthma exacerbation.  No smoking.  Compliant with her inhaled bronchodilators.  Varicose vein procedure end of last year earlier this year.    PAST MEDICAL HISTORY:  Past Medical History:   Diagnosis Date    Anesthesia complication     severe hypotension with block    Anxiety and depression     Arthritis     Asthma     Chronic bronchitis (HCC)     Fibromyalgia     Hypertension     Migraine     Pneumonia     Rash     Reflux     Sleep apnea     Wears glasses      PAST SURGICAL HISTORY:  Past Surgical History:   Procedure Laterality Date    ABDOMEN SURGERY      BRONCHOSCOPY  15     SECTION      x 2    COLONOSCOPY  2013    normal per pt    CYST REMOVAL      DENTAL SURGERY      teeth removed    ENDOSCOPY, COLON, DIAGNOSTIC      ESOPHAGEAL DILATATION      GALLBLADDER SURGERY      GASTRIC FUNDOPLICATION  3/2016    HYSTEROSCOPY  09/10/2015    Hysteroscopy, dilation and curettage with the Luca Mcintosh    OTHER SURGICAL HISTORY  9/10/15    HYSTEROSCOPY, DILATATION AND CURETTAGE WITH MYOJEANNIERELUCA    SHOULDER SURGERY      right    THROAT SURGERY Right 2015    vocal cord stripping    TUBAL LIGATION      UPPER

## 2024-03-12 NOTE — H&P
Hospital Medicine History & Physical      PCP: Orion Miles MD    Date of Admission: 3/12/2024    Date of Service: Pt seen/examined on 24     Chief Complaint:    Chief Complaint   Patient presents with    Chest Pain     Pt was recently admitted with flu and bronchitis; pt states 1hr pta she started having left sided chest pressure; with right hand numbness and weakness, vision went blurry; and she could not focus vision;         History Of Present Illness:      The patient is a 43 y.o. female with pmhx as below who presents to [facility] with chest pain  Pt reports she has varicose veins in both LE, had sclerotherapy done 6 months ago and developed cellulitis about a month ago and felt a thick cord like vein. Was admitted to [facility] for cellulitis but no venous doppler done. Cellulitis improved with abx .. Pt was recently admitted multiple times for asthma exacerbation and recently dcd 2 days ago after tx for flu and possible pna  Since dc she continues to have left sided chest pain with sob and comes back for workup and CTA chest now with right sided PE and also noted to have tachycardia    Past Medical History:        Diagnosis Date    Anesthesia complication     severe hypotension with block    Anxiety and depression     Arthritis     Asthma     Chronic bronchitis (HCC)     Fibromyalgia     Hypertension     Migraine     Pneumonia     Rash     Reflux     Sleep apnea     Wears glasses        Past Surgical History:        Procedure Laterality Date    ABDOMEN SURGERY      BRONCHOSCOPY  15     SECTION      x 2    COLONOSCOPY  2013    normal per pt    CYST REMOVAL      DENTAL SURGERY      teeth removed    ENDOSCOPY, COLON, DIAGNOSTIC      ESOPHAGEAL DILATATION      GALLBLADDER SURGERY      GASTRIC FUNDOPLICATION  3/2016    HYSTEROSCOPY  09/10/2015    Hysteroscopy, dilation and curettage with the Luca Mcintosh    OTHER SURGICAL HISTORY  9/10/15    HYSTEROSCOPY, DILATATION AND CURETTAGE WITH  Reverse  Transcriptase Polymerase Chain Reaction (RT-PCR)-based in vitro  diagnostic test intended for the qualitative detection of nucleic  acids from SARS-CoV-2, influenza A, and influenza B in nasopharyngeal  and nasal swab specimens for use under the FDA’s Emergency Use  Authorization (EUA) only.    Patient Fact Sheet:  https://www.fda.gov/media/728221/download  Provider Fact Sheet: https://www.fda.gov/media/399442/download  EUA: https://www.fda.gov/media/489862/download  IFU: https://www.fda.gov/media/465030/download    Methodology:  RT-PCR          INFLUENZA A NOT DETECTED     INFLUENZA B DETECTED    COVID-19 & Influenza Combo [[id number]]  (Abnormal) Collected: 03/07/24 0900    Order Status: Completed Specimen: Nasopharyngeal Swab Updated: 03/07/24 0940     SARS-CoV-2 RNA, RT PCR NOT DETECTED     Comment: Not Detected results do not preclude SARS-CoV-2 infection  and should not be used as the sole basis for patient management  decisions.  Not Detected results must be combined with clinical  observations, patient history, and epidemiological information.    Note:  SARS-CoV-2 Not Detected results should be considered  presumptive in samples that have a positive influenza B result.  Consider re-testing with an alternateFDA-EUA Covid test if  clinically indicated.  Testing was performed using ABELARDO LUCIA SARS-CoV-2 and Influenza A/B  nucleic acid assay. This test is a multiplex Real-Time Reverse  Transcriptase Polymerase Chain Reaction (RT-PCR)-based in vitro  diagnostic test intended for the qualitative detection of nucleic  acids from SARS-CoV-2, influenza A, and influenza B in nasopharyngeal  and nasal swab specimens for use under the FDA’s Emergency Use  Authorization (EUA) only.    Patient Fact Sheet:  https://www.fda.gov/media/556450/download  Provider Fact Sheet: https://www.fda.gov/media/403030/download  EUA: https://www.fda.gov/media/840702/download  IFU:

## 2024-03-12 NOTE — PROGRESS NOTES
Patient + for DVT in R popliteal per Hattie in Ultrasound. Urgent perfect serve sent to Dr Wisdom. Awaiting further orders.

## 2024-03-12 NOTE — TELEPHONE ENCOUNTER
Pt stated that the insurance dropped the dosing on her albuterol to 0.83 due to it needing a pa and they couldn't do a pa on Sunday.  Pt stated is she supposed to be taking Symbicort or Pulmicort. Please advise

## 2024-03-13 LAB
ANION GAP SERPL CALCULATED.3IONS-SCNC: 16 MMOL/L (ref 3–16)
BASOPHILS # BLD: 0 K/UL (ref 0–0.2)
BASOPHILS NFR BLD: 0.3 %
BUN SERPL-MCNC: 18 MG/DL (ref 7–20)
CALCIUM SERPL-MCNC: 8.2 MG/DL (ref 8.3–10.6)
CHLORIDE SERPL-SCNC: 97 MMOL/L (ref 99–110)
CO2 SERPL-SCNC: 19 MMOL/L (ref 21–32)
CREAT SERPL-MCNC: <0.5 MG/DL (ref 0.6–1.1)
DEPRECATED RDW RBC AUTO: 17.7 % (ref 12.4–15.4)
EOSINOPHIL # BLD: 0 K/UL (ref 0–0.6)
EOSINOPHIL NFR BLD: 0 %
GFR SERPLBLD CREATININE-BSD FMLA CKD-EPI: >60 ML/MIN/{1.73_M2}
GLUCOSE BLD-MCNC: 172 MG/DL (ref 70–99)
GLUCOSE BLD-MCNC: 190 MG/DL (ref 70–99)
GLUCOSE BLD-MCNC: 195 MG/DL (ref 70–99)
GLUCOSE BLD-MCNC: 202 MG/DL (ref 70–99)
GLUCOSE SERPL-MCNC: 193 MG/DL (ref 70–99)
HCT VFR BLD AUTO: 42.3 % (ref 36–48)
HGB BLD-MCNC: 13.6 G/DL (ref 12–16)
LYMPHOCYTES # BLD: 1.3 K/UL (ref 1–5.1)
LYMPHOCYTES NFR BLD: 10.1 %
MCH RBC QN AUTO: 25.3 PG (ref 26–34)
MCHC RBC AUTO-ENTMCNC: 32.2 G/DL (ref 31–36)
MCV RBC AUTO: 78.6 FL (ref 80–100)
MONOCYTES # BLD: 0.4 K/UL (ref 0–1.3)
MONOCYTES NFR BLD: 3.2 %
NEUTROPHILS # BLD: 11.2 K/UL (ref 1.7–7.7)
NEUTROPHILS NFR BLD: 86.4 %
PERFORMED ON: ABNORMAL
PLATELET # BLD AUTO: 219 K/UL (ref 135–450)
PMV BLD AUTO: 9 FL (ref 5–10.5)
POTASSIUM SERPL-SCNC: 4.8 MMOL/L (ref 3.5–5.1)
RBC # BLD AUTO: 5.38 M/UL (ref 4–5.2)
SODIUM SERPL-SCNC: 132 MMOL/L (ref 136–145)
WBC # BLD AUTO: 12.9 K/UL (ref 4–11)

## 2024-03-13 PROCEDURE — 94640 AIRWAY INHALATION TREATMENT: CPT

## 2024-03-13 PROCEDURE — 85025 COMPLETE CBC W/AUTO DIFF WBC: CPT

## 2024-03-13 PROCEDURE — 94761 N-INVAS EAR/PLS OXIMETRY MLT: CPT

## 2024-03-13 PROCEDURE — 36415 COLL VENOUS BLD VENIPUNCTURE: CPT

## 2024-03-13 PROCEDURE — 99233 SBSQ HOSP IP/OBS HIGH 50: CPT | Performed by: INTERNAL MEDICINE

## 2024-03-13 PROCEDURE — 6360000002 HC RX W HCPCS

## 2024-03-13 PROCEDURE — 6370000000 HC RX 637 (ALT 250 FOR IP): Performed by: INTERNAL MEDICINE

## 2024-03-13 PROCEDURE — 80048 BASIC METABOLIC PNL TOTAL CA: CPT

## 2024-03-13 PROCEDURE — 2580000003 HC RX 258

## 2024-03-13 PROCEDURE — 6370000000 HC RX 637 (ALT 250 FOR IP)

## 2024-03-13 PROCEDURE — 2580000003 HC RX 258: Performed by: INTERNAL MEDICINE

## 2024-03-13 PROCEDURE — 2060000000 HC ICU INTERMEDIATE R&B

## 2024-03-13 PROCEDURE — 6360000002 HC RX W HCPCS: Performed by: INTERNAL MEDICINE

## 2024-03-13 RX ADMIN — Medication 2 PUFF: at 07:50

## 2024-03-13 RX ADMIN — MONTELUKAST SODIUM 10 MG: 10 TABLET, COATED ORAL at 20:47

## 2024-03-13 RX ADMIN — PREDNISONE 30 MG: 20 TABLET ORAL at 08:14

## 2024-03-13 RX ADMIN — BENZONATATE 200 MG: 100 CAPSULE ORAL at 20:47

## 2024-03-13 RX ADMIN — CETIRIZINE HYDROCHLORIDE 10 MG: 10 TABLET ORAL at 08:14

## 2024-03-13 RX ADMIN — ALBUTEROL SULFATE 2.5 MG: 2.5 SOLUTION RESPIRATORY (INHALATION) at 18:44

## 2024-03-13 RX ADMIN — AZITHROMYCIN DIHYDRATE 250 MG: 250 TABLET ORAL at 08:15

## 2024-03-13 RX ADMIN — SODIUM CHLORIDE: 9 INJECTION, SOLUTION INTRAVENOUS at 04:56

## 2024-03-13 RX ADMIN — APIXABAN 10 MG: 5 TABLET, FILM COATED ORAL at 18:19

## 2024-03-13 RX ADMIN — PANTOPRAZOLE SODIUM 40 MG: 40 TABLET, DELAYED RELEASE ORAL at 05:42

## 2024-03-13 RX ADMIN — HYDROCODONE BITARTRATE AND HOMATROPINE METHYLBROMIDE 5 ML: 5; 1.5 SOLUTION ORAL at 08:15

## 2024-03-13 RX ADMIN — Medication 2 PUFF: at 18:46

## 2024-03-13 RX ADMIN — ALBUTEROL SULFATE 2.5 MG: 2.5 SOLUTION RESPIRATORY (INHALATION) at 07:50

## 2024-03-13 RX ADMIN — ACETAMINOPHEN 650 MG: 325 TABLET ORAL at 20:47

## 2024-03-13 RX ADMIN — PRAVASTATIN SODIUM 10 MG: 10 TABLET ORAL at 08:15

## 2024-03-13 RX ADMIN — SODIUM CHLORIDE, PRESERVATIVE FREE 10 ML: 5 INJECTION INTRAVENOUS at 08:15

## 2024-03-13 RX ADMIN — BENZONATATE 200 MG: 100 CAPSULE ORAL at 08:14

## 2024-03-13 RX ADMIN — OSELTAMIVIR PHOSPHATE 75 MG: 75 CAPSULE ORAL at 20:47

## 2024-03-13 RX ADMIN — ENOXAPARIN SODIUM 135 MG: 150 INJECTION SUBCUTANEOUS at 05:42

## 2024-03-13 RX ADMIN — BENZONATATE 200 MG: 100 CAPSULE ORAL at 14:19

## 2024-03-13 RX ADMIN — OSELTAMIVIR PHOSPHATE 75 MG: 75 CAPSULE ORAL at 08:15

## 2024-03-13 ASSESSMENT — PAIN SCALES - GENERAL
PAINLEVEL_OUTOF10: 0
PAINLEVEL_OUTOF10: 0
PAINLEVEL_OUTOF10: 7
PAINLEVEL_OUTOF10: 0

## 2024-03-13 ASSESSMENT — PAIN DESCRIPTION - LOCATION: LOCATION: HEAD

## 2024-03-13 ASSESSMENT — PAIN - FUNCTIONAL ASSESSMENT: PAIN_FUNCTIONAL_ASSESSMENT: ACTIVITIES ARE NOT PREVENTED

## 2024-03-13 ASSESSMENT — PAIN DESCRIPTION - DESCRIPTORS: DESCRIPTORS: ACHING

## 2024-03-13 NOTE — PROGRESS NOTES
RT Inhaler-Nebulizer Bronchodilator Protocol Note    There is a bronchodilator order in the chart from a provider indicating to follow the RT Bronchodilator Protocol and there is an “Initiate RT Inhaler-Nebulizer Bronchodilator Protocol” order as well (see protocol at bottom of note).    CXR Findings:  XR CHEST PORTABLE    Result Date: 3/12/2024  No acute cardiopulmonary process.       The findings from the last RT Protocol Assessment were as follows:   History Pulmonary Disease: (P) Chronic pulmonary disease  Respiratory Pattern: (P) Dyspnea on exertion or RR 21-25 bpm  Breath Sounds: (P) Slightly diminished and/or crackles  Cough: (P) Strong, spontaneous, non-productive  Indication for Bronchodilator Therapy: (P) Decreased or absent breath sounds  Bronchodilator Assessment Score: (P) 6    Aerosolized bronchodilator medication orders have been revised according to the RT Inhaler-Nebulizer Bronchodilator Protocol below.    Respiratory Therapist to perform RT Therapy Protocol Assessment initially then follow the protocol.  Repeat RT Therapy Protocol Assessment PRN for score 0-3 or on second treatment, BID, and PRN for scores above 3.    No Indications - adjust the frequency to every 6 hours PRN wheezing or bronchospasm, if no treatments needed after 48 hours then discontinue using Per Protocol order mode.     If indication present, adjust the RT bronchodilator orders based on the Bronchodilator Assessment Score as indicated below.  Use Inhaler orders unless patient has one or more of the following: on home nebulizer, not able to hold breath for 10 seconds, is not alert and oriented, cannot activate and use MDI correctly, or respiratory rate 25 breaths per minute or more, then use the equivalent nebulizer order(s) with same Frequency and PRN reasons based on the score.  If a patient is on this medication at home then do not decrease Frequency below that used at home.    0-3 - enter or revise RT bronchodilator order(s)  to equivalent RT Bronchodilator order with Frequency of every 4 hours PRN for wheezing or increased work of breathing using Per Protocol order mode.        4-6 - enter or revise RT Bronchodilator order(s) to two equivalent RT bronchodilator orders with one order with BID Frequency and one order with Frequency of every 4 hours PRN wheezing or increased work of breathing using Per Protocol order mode.        7-10 - enter or revise RT Bronchodilator order(s) to two equivalent RT bronchodilator orders with one order with TID Frequency and one order with Frequency of every 4 hours PRN wheezing or increased work of breathing using Per Protocol order mode.       11-13 - enter or revise RT Bronchodilator order(s) to one equivalent RT bronchodilator order with QID Frequency and an Albuterol order with Frequency of every 4 hours PRN wheezing or increased work of breathing using Per Protocol order mode.      Greater than 13 - enter or revise RT Bronchodilator order(s) to one equivalent RT bronchodilator order with every 4 hours Frequency and an Albuterol order with Frequency of every 2 hours PRN wheezing or increased work of breathing using Per Protocol order mode.         Electronically signed by Analisa Jay RCP on 3/13/2024 at 7:54 AM

## 2024-03-13 NOTE — PROGRESS NOTES

## 2024-03-13 NOTE — PROGRESS NOTES
RT Inhaler-Nebulizer Bronchodilator Protocol Note    There is a bronchodilator order in the chart from a provider indicating to follow the RT Bronchodilator Protocol and there is an “Initiate RT Inhaler-Nebulizer Bronchodilator Protocol” order as well (see protocol at bottom of note).    CXR Findings:  XR CHEST PORTABLE    Result Date: 3/12/2024  No acute cardiopulmonary process.       The findings from the last RT Protocol Assessment were as follows:   History Pulmonary Disease: Chronic pulmonary disease  Respiratory Pattern: Dyspnea on exertion or RR 21-25 bpm  Breath Sounds: Slightly diminished and/or crackles  Cough: Strong, spontaneous, non-productive  Indication for Bronchodilator Therapy: Decreased or absent breath sounds  Bronchodilator Assessment Score: 6    Aerosolized bronchodilator medication orders have been revised according to the RT Inhaler-Nebulizer Bronchodilator Protocol below.    Respiratory Therapist to perform RT Therapy Protocol Assessment initially then follow the protocol.  Repeat RT Therapy Protocol Assessment PRN for score 0-3 or on second treatment, BID, and PRN for scores above 3.    No Indications - adjust the frequency to every 6 hours PRN wheezing or bronchospasm, if no treatments needed after 48 hours then discontinue using Per Protocol order mode.     If indication present, adjust the RT bronchodilator orders based on the Bronchodilator Assessment Score as indicated below.  Use Inhaler orders unless patient has one or more of the following: on home nebulizer, not able to hold breath for 10 seconds, is not alert and oriented, cannot activate and use MDI correctly, or respiratory rate 25 breaths per minute or more, then use the equivalent nebulizer order(s) with same Frequency and PRN reasons based on the score.  If a patient is on this medication at home then do not decrease Frequency below that used at home.    0-3 - enter or revise RT bronchodilator order(s) to equivalent RT  Bronchodilator order with Frequency of every 4 hours PRN for wheezing or increased work of breathing using Per Protocol order mode.        4-6 - enter or revise RT Bronchodilator order(s) to two equivalent RT bronchodilator orders with one order with BID Frequency and one order with Frequency of every 4 hours PRN wheezing or increased work of breathing using Per Protocol order mode.        7-10 - enter or revise RT Bronchodilator order(s) to two equivalent RT bronchodilator orders with one order with TID Frequency and one order with Frequency of every 4 hours PRN wheezing or increased work of breathing using Per Protocol order mode.       11-13 - enter or revise RT Bronchodilator order(s) to one equivalent RT bronchodilator order with QID Frequency and an Albuterol order with Frequency of every 4 hours PRN wheezing or increased work of breathing using Per Protocol order mode.      Greater than 13 - enter or revise RT Bronchodilator order(s) to one equivalent RT bronchodilator order with every 4 hours Frequency and an Albuterol order with Frequency of every 2 hours PRN wheezing or increased work of breathing using Per Protocol order mode.         Electronically signed by Irma Goldsmith RCP on 3/13/2024 at 6:49 PM

## 2024-03-13 NOTE — CARE COORDINATION
Case Management Assessment  Initial Evaluation    Date/Time of Evaluation: 3/13/2024 8:46 AM  Assessment Completed by: Toby Simmons RN    If patient is discharged prior to next notation, then this note serves as note for discharge by case management.    Patient Name: Pauline Cordero                   YOB: 1980  Diagnosis: Influenza B [J10.1]  Chest pain, unspecified type [R07.9]  Acute pulmonary embolism without acute cor pulmonale, unspecified pulmonary embolism type (HCC) [I26.99]  Acute pulmonary embolism, unspecified pulmonary embolism type, unspecified whether acute cor pulmonale present (HCC) [I26.99]                   Date / Time: 3/12/2024 12:53 PM    Patient Admission Status: Inpatient   Readmission Risk (Low < 19, Mod (19-27), High > 27): Readmission Risk Score: 14.4    Current PCP: Orion Miles MD  PCP verified by CM? Yes    Chart Reviewed: Yes      History Provided by: Patient  Patient Orientation: Alert and Oriented    Patient Cognition: Alert    Hospitalization in the last 30 days (Readmission):  Yes    If yes, Readmission Assessment in  Navigator will be completed.    Advance Directives:      Code Status: Full Code   Patient's Primary Decision Maker is: Legal Next of Kin      Discharge Planning:    Patient lives with: Children, Family Members, Spouse/Significant Other Type of Home: House  Primary Care Giver: Self  Patient Support Systems include: Spouse/Significant Other, Children, Family Members   Current Financial resources: Medicaid  Current community resources: None  Current services prior to admission: Durable Medical Equipment            Current DME: Cane, Walker, Shower Chair            Type of Home Care services:  None    ADLS  Prior functional level: Independent in ADLs/IADLs  Current functional level: Independent in ADLs/IADLs    PT AM-PAC:   /24  OT AM-PAC:   /24    Family can provide assistance at DC: Yes  Would you like Case Management to discuss the discharge plan  with the Discharge Plan?      Toby Simmons RN  Case Management Department  Ph: [phone] Fax: [phone]

## 2024-03-13 NOTE — PROGRESS NOTES
Bedside report and transfer of care given to PHI Sears. Pt currently resting in bed with the call light within reach. Pt denies any other care needs at this time. Pt stable at this time.

## 2024-03-13 NOTE — PROGRESS NOTES
IM Progress Note    Admit Date:  3/12/2024    Patient admitted with acute right lower extremity DVT and pulmonary embolism. On  Full dose anticoagulation with Lovenox      Subjective:  Ms. Cordero is sitting up on the chair   still looks ill and fatigued.    Feels dyspneic   O2 sats maintained on room air   having intermittent chest pains.     Objective:   /83   Pulse 96   Temp 98.2 °F (36.8 °C) (Oral)   Resp 16   Ht 1.651 m (5' 5\")   Wt 126.3 kg (278 lb 6.4 oz)   SpO2 97%   BMI 46.33 kg/m²     Intake/Output Summary (Last 24 hours) at 3/13/2024 1149  Last data filed at 3/12/2024 1832  Gross per 24 hour   Intake 420 ml   Output 100 ml   Net 320 ml         Physical Exam:  General:  Awake, alert, NAD  Patient looks ill and fatigued   well-oriented and in no distress  Skin:  Warm and dry  Neck:  JVD absent. Neck supple  Chest:  Clear to auscultation, respiration easy. No wheezes, rales or rhonchi.   Cardiovascular:  RRR ,S1S2 normal  Abdomen:  Soft, non tender, non distended, BS +  Extremities:  No edema.  Intact peripheral pulses. Brisk cap refill, < 2 secs  Neuro: non focal      Medications:   Scheduled Meds:   apixaban  10 mg Oral BID    Followed by    [START ON 3/20/2024] apixaban  5 mg Oral BID    fluticasone  2 puff Inhalation BID RT    budesonide-formoterol  2 puff Inhalation BID RT    pravastatin  10 mg Oral Daily    sodium chloride flush  5-40 mL IntraVENous 2 times per day    insulin lispro  0-4 Units SubCUTAneous TID WC    insulin lispro  0-4 Units SubCUTAneous Nightly    montelukast  10 mg Oral QHS    pantoprazole  40 mg Oral QAM AC    cetirizine  10 mg Oral Daily    predniSONE  30 mg Oral Daily    benzonatate  200 mg Oral TID    oseltamivir  75 mg Oral BID    azithromycin  250 mg Oral Daily    albuterol  2.5 mg Nebulization BID RT       Continuous Infusions:   dextrose      sodium chloride      sodium chloride 100 mL/hr at 03/13/24 0456       Data:  CBC:   Recent Labs     03/12/24  1301  mellitus) (HCC)    Chest pain    ARJUN (obstructive sleep apnea)    Sinus tachycardia    Acute pulmonary embolism without acute cor pulmonale (HCC)  Resolved Problems:    * No resolved hospital problems. *      Plan:       #Acute Pulmonary Embolism   Acute right lower extremity DVT  - CTPE as above, no evidence of heart strain   - start lovenox 1 mg/kg , plan to transition to Eliquis   - venous dopplers pending -positive for DVT  - stable on room air  - pulmonary consulted   Patient currently on full dose anticoagulation with Lovenox -will switch to Eliquis. .  Prescription sent to meds to beds- check pricing.  Discussed with patient she will need at least 3 months of anticoagulation and then further follow-up with primary physician regarding outpatient hypercoag workup she will also need age-appropriate cancer screening.      #Tachycardia  - 2/2 above  - EKG without acute ischemic changes  - Trop neg x 2  - monitor on telemetry      #Hypokalemia  - replacement ordered  - monitor      #Leukocytosis  - likely 2/2 recent steroid use and influenza  - afebrile  - monitor      #COPD /Asthma   - no AE  - continue recent steroid taper from recent adm  - continue scheduled and PRN inhalers       #Influenza B  - recently dx last week   - complete tamiflu   - droplet isolation      #HTN  - BP stable      #HLD  - continue statin      #ARJUN on CPAP     #T2DM  - SSI, resume home regimen on d/c  - monitor BG     #Obesity  - BMI 47.59  -Complicating assessment and treatment. Placing patient at risk for multiple co-morbidities as well as early death and contributing to the patient's presentation  - counseled regarding diet modification and weight loss         Note Acute PE, tachycardia, hypokalemia makes patient higher risk for morbidity and mortality requiring testing and treatment.         DVT Prophylaxis: Lovenox- Eliquis   ADULT DIET; Regular; 4 carb choices (60 gm/meal)   Full Code        Ingrid Wisdom MD   3/13/2024 11:49

## 2024-03-13 NOTE — PROGRESS NOTES
Pulmonary Progress Note    CC: PE    Subjective:   Somewhat improving  Room air        Intake/Output Summary (Last 24 hours) at 3/13/2024 0746  Last data filed at 3/12/2024 1832  Gross per 24 hour   Intake 420 ml   Output 100 ml   Net 320 ml       Exam:   /80   Pulse (!) 101   Temp 98.2 °F (36.8 °C) (Oral)   Resp 20   Ht 1.651 m (5' 5\")   Wt 126.3 kg (278 lb 6.4 oz)   SpO2 96%   BMI 46.33 kg/m²  on room air  Gen: No distress.  Build obese  Eyes: PERRL. No sclera icterus. No conjunctival injection.   ENT: No discharge. Pharynx clear.   Neck: Trachea midline. No obvious mass.    Resp: No accessory muscle use. No crackles. Few wheezes. No rhonchi. No dullness on percussion.  CV: Regular rate. Regular rhythm. No murmur or rub.  Mild LE edema.   GI: Non-tender. Non-distended. No hernia.   Skin: Warm and dry. No nodule on exposed extremities.   Lymph: No cervical LAD. No supraclavicular LAD.   M/S: No cyanosis. No joint deformity. No clubbing.   Neuro: Awake. Alert. Moves all four extremities.   Psych: Oriented x 3. No anxiety.     Scheduled Meds:   fluticasone  2 puff Inhalation BID RT    budesonide-formoterol  2 puff Inhalation BID RT    pravastatin  10 mg Oral Daily    sodium chloride flush  5-40 mL IntraVENous 2 times per day    enoxaparin  1 mg/kg SubCUTAneous BID    insulin lispro  0-4 Units SubCUTAneous TID WC    insulin lispro  0-4 Units SubCUTAneous Nightly    montelukast  10 mg Oral QHS    pantoprazole  40 mg Oral QAM AC    cetirizine  10 mg Oral Daily    predniSONE  30 mg Oral Daily    benzonatate  200 mg Oral TID    oseltamivir  75 mg Oral BID    azithromycin  250 mg Oral Daily    albuterol  2.5 mg Nebulization BID RT     Continuous Infusions:   dextrose      sodium chloride      sodium chloride 100 mL/hr at 03/13/24 0456     PRN Meds:  iopamidol, glucose, dextrose bolus **OR** dextrose bolus, glucagon (rDNA), dextrose, sodium chloride flush, sodium chloride, potassium chloride **OR** potassium

## 2024-03-13 NOTE — TELEPHONE ENCOUNTER
Pt is aware. Pt stated that they switched her dulerea due to hospital not having it pt wants to know if your keeping the medication changes the same when she gets out or is she going back to her regular medications?

## 2024-03-13 NOTE — ACP (ADVANCE CARE PLANNING)
Advance Care Planning     General Advance Care Planning (ACP) Conversation    Date of Conversation: 3/13/2024  Conducted with: Patient with Decision Making Capacity    Healthcare Decision Maker:  No healthcare decision makers have been documented.  Click here to complete HealthCare Decision Makers including selection of the Healthcare Decision Maker Relationship (ie \"Primary\")   Today we documented Decision Maker(s) consistent with Legal Next of Kin hierarchy.    Content/Action Overview:  DECLINED ACP Conversation - will revisit periodically  Reviewed DNR/DNI and patient elects Full Code (Attempt Resuscitation)        Length of Voluntary ACP Conversation in minutes:  <16 minutes (Non-Billable)    Toby Simmons RN

## 2024-03-13 NOTE — FLOWSHEET NOTE
03/12/24 2022   Vital Signs   Pulse (!) 118   Heart Rate Source Monitor   Pain Assessment   Pain Assessment None - Denies Pain   Opioid-Induced Sedation   POSS Score 1     Vital signs taken and shift assessment is complete, see flow sheet. Pt denies c/o pain at this time. Pt given fan and sheet per request for comfort. Pt A&OX 4 with call light within reach and uses appropriately.

## 2024-03-14 PROBLEM — I82.4Y1 ACUTE DEEP VEIN THROMBOSIS (DVT) OF PROXIMAL VEIN OF RIGHT LOWER EXTREMITY (HCC): Status: ACTIVE | Noted: 2024-03-14

## 2024-03-14 LAB
ANION GAP SERPL CALCULATED.3IONS-SCNC: 9 MMOL/L (ref 3–16)
BASOPHILS # BLD: 0 K/UL (ref 0–0.2)
BASOPHILS NFR BLD: 0.3 %
BUN SERPL-MCNC: 18 MG/DL (ref 7–20)
CALCIUM SERPL-MCNC: 8.5 MG/DL (ref 8.3–10.6)
CHLORIDE SERPL-SCNC: 106 MMOL/L (ref 99–110)
CK SERPL-CCNC: 19 U/L (ref 26–192)
CO2 SERPL-SCNC: 24 MMOL/L (ref 21–32)
CREAT SERPL-MCNC: <0.5 MG/DL (ref 0.6–1.1)
DEPRECATED RDW RBC AUTO: 17.1 % (ref 12.4–15.4)
EOSINOPHIL # BLD: 0.1 K/UL (ref 0–0.6)
EOSINOPHIL NFR BLD: 1 %
GFR SERPLBLD CREATININE-BSD FMLA CKD-EPI: >60 ML/MIN/{1.73_M2}
GLUCOSE BLD-MCNC: 145 MG/DL (ref 70–99)
GLUCOSE BLD-MCNC: 179 MG/DL (ref 70–99)
GLUCOSE BLD-MCNC: 201 MG/DL (ref 70–99)
GLUCOSE BLD-MCNC: 204 MG/DL (ref 70–99)
GLUCOSE SERPL-MCNC: 139 MG/DL (ref 70–99)
HCT VFR BLD AUTO: 39.1 % (ref 36–48)
HGB BLD-MCNC: 12.9 G/DL (ref 12–16)
LYMPHOCYTES # BLD: 3 K/UL (ref 1–5.1)
LYMPHOCYTES NFR BLD: 26.1 %
MCH RBC QN AUTO: 25.6 PG (ref 26–34)
MCHC RBC AUTO-ENTMCNC: 33 G/DL (ref 31–36)
MCV RBC AUTO: 77.6 FL (ref 80–100)
MONOCYTES # BLD: 0.8 K/UL (ref 0–1.3)
MONOCYTES NFR BLD: 7.1 %
NEUTROPHILS # BLD: 7.4 K/UL (ref 1.7–7.7)
NEUTROPHILS NFR BLD: 65.5 %
PERFORMED ON: ABNORMAL
PHOSPHATE SERPL-MCNC: 3.7 MG/DL (ref 2.5–4.9)
PLATELET # BLD AUTO: 195 K/UL (ref 135–450)
PMV BLD AUTO: 8.6 FL (ref 5–10.5)
POTASSIUM SERPL-SCNC: 4.5 MMOL/L (ref 3.5–5.1)
RBC # BLD AUTO: 5.04 M/UL (ref 4–5.2)
SODIUM SERPL-SCNC: 139 MMOL/L (ref 136–145)
WBC # BLD AUTO: 11.3 K/UL (ref 4–11)

## 2024-03-14 PROCEDURE — 36415 COLL VENOUS BLD VENIPUNCTURE: CPT

## 2024-03-14 PROCEDURE — 2060000000 HC ICU INTERMEDIATE R&B

## 2024-03-14 PROCEDURE — 6370000000 HC RX 637 (ALT 250 FOR IP)

## 2024-03-14 PROCEDURE — 99233 SBSQ HOSP IP/OBS HIGH 50: CPT | Performed by: INTERNAL MEDICINE

## 2024-03-14 PROCEDURE — 2580000003 HC RX 258

## 2024-03-14 PROCEDURE — 6360000002 HC RX W HCPCS: Performed by: INTERNAL MEDICINE

## 2024-03-14 PROCEDURE — 6370000000 HC RX 637 (ALT 250 FOR IP): Performed by: INTERNAL MEDICINE

## 2024-03-14 PROCEDURE — 2580000003 HC RX 258: Performed by: INTERNAL MEDICINE

## 2024-03-14 PROCEDURE — 82550 ASSAY OF CK (CPK): CPT

## 2024-03-14 PROCEDURE — 85025 COMPLETE CBC W/AUTO DIFF WBC: CPT

## 2024-03-14 PROCEDURE — 80048 BASIC METABOLIC PNL TOTAL CA: CPT

## 2024-03-14 PROCEDURE — 94761 N-INVAS EAR/PLS OXIMETRY MLT: CPT

## 2024-03-14 PROCEDURE — 94640 AIRWAY INHALATION TREATMENT: CPT

## 2024-03-14 PROCEDURE — 84100 ASSAY OF PHOSPHORUS: CPT

## 2024-03-14 RX ORDER — GABAPENTIN 100 MG/1
100 CAPSULE ORAL 3 TIMES DAILY
Status: DISCONTINUED | OUTPATIENT
Start: 2024-03-14 | End: 2024-03-15 | Stop reason: HOSPADM

## 2024-03-14 RX ORDER — IPRATROPIUM BROMIDE AND ALBUTEROL SULFATE 2.5; .5 MG/3ML; MG/3ML
1 SOLUTION RESPIRATORY (INHALATION)
Status: DISCONTINUED | OUTPATIENT
Start: 2024-03-14 | End: 2024-03-15

## 2024-03-14 RX ORDER — METHOCARBAMOL 500 MG/1
750 TABLET, FILM COATED ORAL 2 TIMES DAILY
Status: DISCONTINUED | OUTPATIENT
Start: 2024-03-14 | End: 2024-03-15 | Stop reason: HOSPADM

## 2024-03-14 RX ADMIN — Medication 2 PUFF: at 07:47

## 2024-03-14 RX ADMIN — HYDROCODONE BITARTRATE AND HOMATROPINE METHYLBROMIDE 5 ML: 5; 1.5 SOLUTION ORAL at 08:09

## 2024-03-14 RX ADMIN — PANTOPRAZOLE SODIUM 40 MG: 40 TABLET, DELAYED RELEASE ORAL at 05:36

## 2024-03-14 RX ADMIN — METHOCARBAMOL TABLETS 750 MG: 500 TABLET, COATED ORAL at 12:24

## 2024-03-14 RX ADMIN — SODIUM CHLORIDE, PRESERVATIVE FREE 10 ML: 5 INJECTION INTRAVENOUS at 21:59

## 2024-03-14 RX ADMIN — Medication 2 PUFF: at 19:20

## 2024-03-14 RX ADMIN — METHOCARBAMOL TABLETS 750 MG: 500 TABLET, COATED ORAL at 21:48

## 2024-03-14 RX ADMIN — METHYLPREDNISOLONE SODIUM SUCCINATE 40 MG: 40 INJECTION INTRAMUSCULAR; INTRAVENOUS at 12:24

## 2024-03-14 RX ADMIN — HYDROCODONE BITARTRATE AND HOMATROPINE METHYLBROMIDE 5 ML: 5; 1.5 SOLUTION ORAL at 16:38

## 2024-03-14 RX ADMIN — ALBUTEROL SULFATE 2.5 MG: 2.5 SOLUTION RESPIRATORY (INHALATION) at 07:46

## 2024-03-14 RX ADMIN — GABAPENTIN 100 MG: 100 CAPSULE ORAL at 21:48

## 2024-03-14 RX ADMIN — INSULIN LISPRO 1 UNITS: 100 INJECTION, SOLUTION INTRAVENOUS; SUBCUTANEOUS at 16:38

## 2024-03-14 RX ADMIN — OSELTAMIVIR PHOSPHATE 75 MG: 75 CAPSULE ORAL at 21:48

## 2024-03-14 RX ADMIN — PRAVASTATIN SODIUM 10 MG: 10 TABLET ORAL at 08:10

## 2024-03-14 RX ADMIN — CETIRIZINE HYDROCHLORIDE 10 MG: 10 TABLET ORAL at 08:10

## 2024-03-14 RX ADMIN — SODIUM CHLORIDE: 9 INJECTION, SOLUTION INTRAVENOUS at 01:35

## 2024-03-14 RX ADMIN — MONTELUKAST SODIUM 10 MG: 10 TABLET, COATED ORAL at 21:50

## 2024-03-14 RX ADMIN — BENZONATATE 200 MG: 100 CAPSULE ORAL at 21:48

## 2024-03-14 RX ADMIN — APIXABAN 10 MG: 5 TABLET, FILM COATED ORAL at 21:48

## 2024-03-14 RX ADMIN — PREDNISONE 30 MG: 20 TABLET ORAL at 08:10

## 2024-03-14 RX ADMIN — GABAPENTIN 100 MG: 100 CAPSULE ORAL at 13:12

## 2024-03-14 RX ADMIN — BENZONATATE 200 MG: 100 CAPSULE ORAL at 08:10

## 2024-03-14 RX ADMIN — BENZONATATE 200 MG: 100 CAPSULE ORAL at 13:12

## 2024-03-14 RX ADMIN — IPRATROPIUM BROMIDE AND ALBUTEROL SULFATE 1 DOSE: 2.5; .5 SOLUTION RESPIRATORY (INHALATION) at 23:07

## 2024-03-14 RX ADMIN — METHYLPREDNISOLONE SODIUM SUCCINATE 40 MG: 40 INJECTION INTRAMUSCULAR; INTRAVENOUS at 21:50

## 2024-03-14 RX ADMIN — OSELTAMIVIR PHOSPHATE 75 MG: 75 CAPSULE ORAL at 08:10

## 2024-03-14 RX ADMIN — IPRATROPIUM BROMIDE AND ALBUTEROL SULFATE 1 DOSE: 2.5; .5 SOLUTION RESPIRATORY (INHALATION) at 13:22

## 2024-03-14 RX ADMIN — APIXABAN 10 MG: 5 TABLET, FILM COATED ORAL at 08:09

## 2024-03-14 RX ADMIN — AZITHROMYCIN DIHYDRATE 250 MG: 250 TABLET ORAL at 08:10

## 2024-03-14 RX ADMIN — SODIUM CHLORIDE, PRESERVATIVE FREE 10 ML: 5 INJECTION INTRAVENOUS at 08:10

## 2024-03-14 RX ADMIN — IPRATROPIUM BROMIDE AND ALBUTEROL SULFATE 1 DOSE: 2.5; .5 SOLUTION RESPIRATORY (INHALATION) at 19:17

## 2024-03-14 ASSESSMENT — PAIN DESCRIPTION - DESCRIPTORS: DESCRIPTORS: ACHING;DISCOMFORT

## 2024-03-14 ASSESSMENT — PAIN DESCRIPTION - LOCATION: LOCATION: HEAD;BACK

## 2024-03-14 ASSESSMENT — PAIN - FUNCTIONAL ASSESSMENT: PAIN_FUNCTIONAL_ASSESSMENT: ACTIVITIES ARE NOT PREVENTED

## 2024-03-14 ASSESSMENT — PAIN SCALES - GENERAL
PAINLEVEL_OUTOF10: 0
PAINLEVEL_OUTOF10: 5
PAINLEVEL_OUTOF10: 0

## 2024-03-14 NOTE — PROGRESS NOTES
See pm shift assess and vitals.  Medicated for headache; talking about various health problems.  Pleasant, IV infusing w/o diff.  Talking on phone; call light in reach.

## 2024-03-14 NOTE — PROGRESS NOTES
Physician Progress Note      PATIENT:               ANDREA DIXON  CSN #:                  [id number]  :                       1980  ADMIT DATE:       3/12/2024 12:53 PM  DISCH DATE:  RESPONDING  PROVIDER #:        Ingrid Wisdom MD          QUERY TEXT:    Pt admitted with Acute PE and pulmonary consult progress note on 3/12 states,   \"Acute pulmonary embolism RLL 3/12/2024-probably provoked by recent   hospitalization and varicose vein procedures\".  Please specify:    The medical record reflects the following:  Risk Factors: age, recent hospitalization and varicose vein procedures,   asthma, influenza B,  Varicose vein post procedure end of 2023  Clinical Indicators: pulmo: Acute pulmonary embolism RLL 3/12/2024-probably   provoked by recent hospitalization and varicose vein procedures  Treatment: CT chest, pulmonary consult,  lovenox 1 mg/kg , plan to transition   to Eliquis, venous doppler study    Thank you,  Jenna Ernst RN,BSN,CCDS,CRCR  Options provided:  -- Acute PE due to recent hospitalization and varicose vein procedures  -- Acute PE unrelated to recent hospitalization and varicose vein procedures  -- Other - I will add my own diagnosis  -- Disagree - Not applicable / Not valid  -- Disagree - Clinically unable to determine / Unknown  -- Refer to Clinical Documentation Reviewer    PROVIDER RESPONSE TEXT:    This patient has acute PE due to recent hospitalization and varicose vein   procedures.    Query created by: Jenna Ernst on 3/13/2024 10:38 AM      Electronically signed by:  Ingrid Wisdom MD 3/14/2024 8:38 AM

## 2024-03-14 NOTE — PROGRESS NOTES
benzonatate  200 mg Oral TID    oseltamivir  75 mg Oral BID    albuterol  2.5 mg Nebulization BID RT       Continuous Infusions:   dextrose      sodium chloride         Data:  CBC:   Recent Labs     03/12/24  1800 03/13/24  0456 03/14/24  0507   WBC 13.4* 12.9* 11.3*   RBC 5.52* 5.38* 5.04   HGB 13.7 13.6 12.9   HCT 43.2 42.3 39.1   MCV 78.2* 78.6* 77.6*   RDW 17.4* 17.7* 17.1*    219 195       BMP:   Recent Labs     03/12/24  1301 03/13/24  0456 03/14/24  0507    132* 139   K 3.4* 4.8 4.5   CL 98* 97* 106   CO2 26 19* 24   BUN 21* 18 18   CREATININE 0.6 <0.5* <0.5*       BNP: No results for input(s): \"BNP\" in the last 72 hours.  PT/INR: No results for input(s): \"PROTIME\", \"INR\" in the last 72 hours.  APTT: No results for input(s): \"APTT\" in the last 72 hours.  CARDIAC ENZYMES: No results for input(s): \"CKMB\", \"CKMBINDEX\", \"TROPONINI\" in the last 72 hours.    Invalid input(s): \"CKTOTAL;3\"  FASTING LIPID PANEL:No results found for: \"CHOL\", \"HDL\", \"TRIG\"  LIVER PROFILE:   Recent Labs     03/12/24  1301   AST 13*   ALT 24   BILITOT 0.5   ALKPHOS 85            Cultures  culture  Results       Procedure Component Value Units Date/Time    Culture, Blood 1 [[id number]] Collected: 03/12/24 1800    Order Status: Completed Specimen: Blood Updated: 03/13/24 1815     Blood Culture, Routine No Growth to date.  Any change in status will be called.    Narrative:      ORDER#: [id number]                          ORDERED BY: BRITTANY WELSH  SOURCE: Blood Hand, Right                  COLLECTED:  03/12/24 18:00  ANTIBIOTICS AT [facility].:                      RECEIVED :  03/13/24 00:45  If child <=2 yrs old please draw pediatric bottle.~Blood Culture 1    Culture, Blood 2 [[id number]] Collected: 03/12/24 1800    Order Status: Completed Specimen: Blood Updated: 03/13/24 1815     Culture, Blood 2 No Growth to date.  Any change in status will be called.    Narrative:      ORDER#: [id number]                          ORDERED BY:  thrombosis in the lower extremities bilaterally.    Limited visualization of the greater saphenous vein due to patient history    of multiple venous interventions including ablation and filling.    There is hypoechoic totally occluding material present within the lumen of    the bilateral small saphenous veins and isolated small varicosity segments    at the left anterior calf/shin (area of concern). These may represent a    superficial venous thrombosis. However, these are more likely due to vein    closure/filling procedures consistent with the patient's history.           Assessment:  Principal Problem:    Acute pulmonary embolism, unspecified pulmonary embolism type, unspecified whether acute cor pulmonale present ([facility])  Active Problems:    ARJUN on CPAP    Obesity, Class III, BMI 40-49.9 (morbid obesity) ([facility])    Essential (primary) hypertension    Leukocytosis    Hypokalemia    Tachycardia    COPD (chronic obstructive pulmonary disease) ([facility])    HLD (hyperlipidemia)    T2DM (type 2 diabetes mellitus) ([facility])    Chest pain    ARJUN (obstructive sleep apnea)    Sinus tachycardia    Acute pulmonary embolism without acute cor pulmonale ([facility])    Acute deep vein thrombosis (DVT) of proximal vein of right lower extremity ([facility])  Resolved Problems:    * No resolved hospital problems. *      Plan:       #Acute Pulmonary Embolism   # Acute right lower extremity DVT  - CTPE as above, no evidence of heart strain   -Lower extremity Dopplers positive for right leg DVT  - started o full dose anticoagulationn  lovenox 1 mg/kg -> transitioned to Eliquis.  Eliquis is covered by her insurance and she already has the medications through meds to beds.  - stable on room air  - pulmonary consulted and following  -Patient has been advised that she will be discharged home on Eliquis ;   Discussed with patient -she will need at least 3 months of anticoagulation and then further follow-up with primary physician regarding outpatient hypercoag

## 2024-03-14 NOTE — PROGRESS NOTES
Pulmonary Progress Note    CC: PE    Subjective:   Feels breathing is worse today with wheezing  Room air      Intake/Output Summary (Last 24 hours) at 3/14/2024 0645  Last data filed at 3/14/2024 0439  Gross per 24 hour   Intake 4155 ml   Output --   Net 4155 ml       Exam:   /71   Pulse (!) 103   Temp 97.7 °F (36.5 °C) (Oral)   Resp 18   Ht 1.651 m (5' 5\")   Wt 126.1 kg (278 lb)   SpO2 98%   BMI 46.26 kg/m²  on room air  Gen: No distress.  Morbidly obese  Eyes: PERRL. No sclera icterus. No conjunctival injection.   ENT: No discharge. Pharynx clear.   Neck: Trachea midline. No obvious mass.    Resp: No accessory muscle use. No crackles.  Bilateral wheezes. No rhonchi. No dullness on percussion.  CV: Regular rate. Regular rhythm. No murmur or rub.  Mild LE edema.   GI: Non-tender. Non-distended. No hernia.   Skin: Warm and dry. No nodule on exposed extremities.   Lymph: No cervical LAD. No supraclavicular LAD.   M/S: No cyanosis. No joint deformity. No clubbing.   Neuro: Awake. Alert. Moves all four extremities.   Psych: Oriented x 3. No anxiety.     Scheduled Meds:   apixaban  10 mg Oral BID    Followed by    [START ON 3/20/2024] apixaban  5 mg Oral BID    fluticasone  2 puff Inhalation BID RT    budesonide-formoterol  2 puff Inhalation BID RT    pravastatin  10 mg Oral Daily    sodium chloride flush  5-40 mL IntraVENous 2 times per day    insulin lispro  0-4 Units SubCUTAneous TID WC    insulin lispro  0-4 Units SubCUTAneous Nightly    montelukast  10 mg Oral QHS    pantoprazole  40 mg Oral QAM AC    cetirizine  10 mg Oral Daily    predniSONE  30 mg Oral Daily    benzonatate  200 mg Oral TID    oseltamivir  75 mg Oral BID    azithromycin  250 mg Oral Daily    albuterol  2.5 mg Nebulization BID RT     Continuous Infusions:   dextrose      sodium chloride      sodium chloride 100 mL/hr at 03/14/24 0135     PRN Meds:  iopamidol, glucose, dextrose bolus **OR** dextrose bolus, glucagon (rDNA), dextrose,  varicosity segments    at the left anterior calf/shin (area of concern). These may represent a    superficial venous thrombosis. However, these are more likely due to vein    closure/filling procedures consistent with the patient's history.       ASSESSMENT:  Acute pulmonary embolism RLL 3/12/2024-probably provoked by recent hospitalization and varicose vein procedures  Acute RLE DVT  Sinus tachycardia and chest pain-can due to above  Moderate persistent asthma with acute exacerbation  ARJUN -no longer has CPAP due to noncompliance  Recent admission for influenza B and asthma exacerbation discharge 3/10/2024  Varicose vein post procedure end of 2023 LE 2024     PLAN:  Supplemental oxygen to maintain SaO2 >92%; wean as tolerated  Inhaled bronchodilators  IV Solu-Medrol given worsening wheezes  Zithromax and Tamiflu day #3  Eliquis  I recommend 3 months of anticoagulation.  I would do hypercoagulopathy workup after patient off anticoagulation.   Cancer screening for age including colonoscopy, pap smear and mammogram if indicated.

## 2024-03-14 NOTE — PROGRESS NOTES
03/14/24 1900   RT Protocol   History Pulmonary Disease 2   Respiratory pattern 0   Breath sounds 6   Cough 0   Indications for Bronchodilator Therapy Decreased or absent breath sounds   Bronchodilator Assessment Score 8     RT Inhaler-Nebulizer Bronchodilator Protocol Note    There is a bronchodilator order in the chart from a provider indicating to follow the RT Bronchodilator Protocol and there is an “Initiate RT Inhaler-Nebulizer Bronchodilator Protocol” order as well (see protocol at bottom of note).    CXR Findings:  No results found.    The findings from the last RT Protocol Assessment were as follows:   History Pulmonary Disease: Chronic pulmonary disease  Respiratory Pattern: Regular pattern and RR 12-20 bpm  Breath Sounds: Inspiratory and expiratory or bilateral wheezing and/or rhonchi  Cough: Strong, spontaneous, non-productive  Indication for Bronchodilator Therapy: Decreased or absent breath sounds  Bronchodilator Assessment Score: 8    Aerosolized bronchodilator medication orders have been revised according to the RT Inhaler-Nebulizer Bronchodilator Protocol below.    Respiratory Therapist to perform RT Therapy Protocol Assessment initially then follow the protocol.  Repeat RT Therapy Protocol Assessment PRN for score 0-3 or on second treatment, BID, and PRN for scores above 3.    No Indications - adjust the frequency to every 6 hours PRN wheezing or bronchospasm, if no treatments needed after 48 hours then discontinue using Per Protocol order mode.     If indication present, adjust the RT bronchodilator orders based on the Bronchodilator Assessment Score as indicated below.  Use Inhaler orders unless patient has one or more of the following: on home nebulizer, not able to hold breath for 10 seconds, is not alert and oriented, cannot activate and use MDI correctly, or respiratory rate 25 breaths per minute or more, then use the equivalent nebulizer order(s) with same Frequency and PRN reasons based  on the score.  If a patient is on this medication at home then do not decrease Frequency below that used at home.    0-3 - enter or revise RT bronchodilator order(s) to equivalent RT Bronchodilator order with Frequency of every 4 hours PRN for wheezing or increased work of breathing using Per Protocol order mode.        4-6 - enter or revise RT Bronchodilator order(s) to two equivalent RT bronchodilator orders with one order with BID Frequency and one order with Frequency of every 4 hours PRN wheezing or increased work of breathing using Per Protocol order mode.        7-10 - enter or revise RT Bronchodilator order(s) to two equivalent RT bronchodilator orders with one order with TID Frequency and one order with Frequency of every 4 hours PRN wheezing or increased work of breathing using Per Protocol order mode.       11-13 - enter or revise RT Bronchodilator order(s) to one equivalent RT bronchodilator order with QID Frequency and an Albuterol order with Frequency of every 4 hours PRN wheezing or increased work of breathing using Per Protocol order mode.      Greater than 13 - enter or revise RT Bronchodilator order(s) to one equivalent RT bronchodilator order with every 4 hours Frequency and an Albuterol order with Frequency of every 2 hours PRN wheezing or increased work of breathing using Per Protocol order mode.       Electronically signed by Kapil Farrar RCP on 3/14/2024 at 7:38 PM

## 2024-03-15 ENCOUNTER — PATIENT MESSAGE (OUTPATIENT)
Dept: PULMONOLOGY | Age: 44
End: 2024-03-15

## 2024-03-15 VITALS
HEART RATE: 100 BPM | HEIGHT: 65 IN | RESPIRATION RATE: 18 BRPM | OXYGEN SATURATION: 98 % | SYSTOLIC BLOOD PRESSURE: 132 MMHG | WEIGHT: 293 LBS | BODY MASS INDEX: 48.82 KG/M2 | DIASTOLIC BLOOD PRESSURE: 90 MMHG | TEMPERATURE: 98.3 F

## 2024-03-15 LAB
ANION GAP SERPL CALCULATED.3IONS-SCNC: 16 MMOL/L (ref 3–16)
BASOPHILS # BLD: 0.1 K/UL (ref 0–0.2)
BASOPHILS NFR BLD: 0.5 %
BUN SERPL-MCNC: 16 MG/DL (ref 7–20)
CALCIUM SERPL-MCNC: 8.5 MG/DL (ref 8.3–10.6)
CHLORIDE SERPL-SCNC: 96 MMOL/L (ref 99–110)
CO2 SERPL-SCNC: 22 MMOL/L (ref 21–32)
CREAT SERPL-MCNC: <0.5 MG/DL (ref 0.6–1.1)
DEPRECATED RDW RBC AUTO: 17.3 % (ref 12.4–15.4)
EOSINOPHIL # BLD: 0 K/UL (ref 0–0.6)
EOSINOPHIL NFR BLD: 0 %
GFR SERPLBLD CREATININE-BSD FMLA CKD-EPI: >60 ML/MIN/{1.73_M2}
GLUCOSE BLD-MCNC: 162 MG/DL (ref 70–99)
GLUCOSE BLD-MCNC: 218 MG/DL (ref 70–99)
GLUCOSE SERPL-MCNC: 252 MG/DL (ref 70–99)
HCT VFR BLD AUTO: 39.5 % (ref 36–48)
HGB BLD-MCNC: 12.9 G/DL (ref 12–16)
LYMPHOCYTES # BLD: 0.9 K/UL (ref 1–5.1)
LYMPHOCYTES NFR BLD: 6.5 %
MCH RBC QN AUTO: 25.4 PG (ref 26–34)
MCHC RBC AUTO-ENTMCNC: 32.7 G/DL (ref 31–36)
MCV RBC AUTO: 77.8 FL (ref 80–100)
MONOCYTES # BLD: 0.4 K/UL (ref 0–1.3)
MONOCYTES NFR BLD: 2.5 %
NEUTROPHILS # BLD: 13.1 K/UL (ref 1.7–7.7)
NEUTROPHILS NFR BLD: 90.5 %
PERFORMED ON: ABNORMAL
PERFORMED ON: ABNORMAL
PLATELET # BLD AUTO: 208 K/UL (ref 135–450)
PMV BLD AUTO: 8.5 FL (ref 5–10.5)
POTASSIUM SERPL-SCNC: 4.8 MMOL/L (ref 3.5–5.1)
RBC # BLD AUTO: 5.08 M/UL (ref 4–5.2)
SODIUM SERPL-SCNC: 134 MMOL/L (ref 136–145)
WBC # BLD AUTO: 14.5 K/UL (ref 4–11)

## 2024-03-15 PROCEDURE — 99233 SBSQ HOSP IP/OBS HIGH 50: CPT | Performed by: INTERNAL MEDICINE

## 2024-03-15 PROCEDURE — 2580000003 HC RX 258: Performed by: INTERNAL MEDICINE

## 2024-03-15 PROCEDURE — 2580000003 HC RX 258

## 2024-03-15 PROCEDURE — 6370000000 HC RX 637 (ALT 250 FOR IP)

## 2024-03-15 PROCEDURE — 99239 HOSP IP/OBS DSCHRG MGMT >30: CPT | Performed by: INTERNAL MEDICINE

## 2024-03-15 PROCEDURE — 85025 COMPLETE CBC W/AUTO DIFF WBC: CPT

## 2024-03-15 PROCEDURE — 80048 BASIC METABOLIC PNL TOTAL CA: CPT

## 2024-03-15 PROCEDURE — 94640 AIRWAY INHALATION TREATMENT: CPT

## 2024-03-15 PROCEDURE — 94761 N-INVAS EAR/PLS OXIMETRY MLT: CPT

## 2024-03-15 PROCEDURE — 6370000000 HC RX 637 (ALT 250 FOR IP): Performed by: INTERNAL MEDICINE

## 2024-03-15 PROCEDURE — 6360000002 HC RX W HCPCS: Performed by: INTERNAL MEDICINE

## 2024-03-15 PROCEDURE — 36415 COLL VENOUS BLD VENIPUNCTURE: CPT

## 2024-03-15 RX ORDER — IPRATROPIUM BROMIDE AND ALBUTEROL SULFATE 2.5; .5 MG/3ML; MG/3ML
1 SOLUTION RESPIRATORY (INHALATION)
Status: DISCONTINUED | OUTPATIENT
Start: 2024-03-15 | End: 2024-03-15 | Stop reason: HOSPADM

## 2024-03-15 RX ORDER — GABAPENTIN 100 MG/1
100 CAPSULE ORAL 3 TIMES DAILY
Qty: 30 CAPSULE | Refills: 0 | COMMUNITY
Start: 2024-03-15

## 2024-03-15 RX ORDER — PREDNISONE 10 MG/1
TABLET ORAL
Qty: 40 TABLET | Refills: 0 | Status: SHIPPED | OUTPATIENT
Start: 2024-03-15

## 2024-03-15 RX ORDER — FUROSEMIDE 10 MG/ML
40 INJECTION INTRAMUSCULAR; INTRAVENOUS ONCE
Status: DISCONTINUED | OUTPATIENT
Start: 2024-03-15 | End: 2024-03-15 | Stop reason: HOSPADM

## 2024-03-15 RX ORDER — IPRATROPIUM BROMIDE AND ALBUTEROL SULFATE 2.5; .5 MG/3ML; MG/3ML
3 SOLUTION RESPIRATORY (INHALATION) EVERY 6 HOURS PRN
Qty: 360 ML | Refills: 0 | Status: SHIPPED | OUTPATIENT
Start: 2024-03-15

## 2024-03-15 RX ADMIN — IPRATROPIUM BROMIDE AND ALBUTEROL SULFATE 1 DOSE: 2.5; .5 SOLUTION RESPIRATORY (INHALATION) at 11:20

## 2024-03-15 RX ADMIN — METHYLPREDNISOLONE SODIUM SUCCINATE 40 MG: 40 INJECTION INTRAMUSCULAR; INTRAVENOUS at 08:12

## 2024-03-15 RX ADMIN — BENZONATATE 200 MG: 100 CAPSULE ORAL at 14:20

## 2024-03-15 RX ADMIN — BENZONATATE 200 MG: 100 CAPSULE ORAL at 08:11

## 2024-03-15 RX ADMIN — SODIUM CHLORIDE, PRESERVATIVE FREE 10 ML: 5 INJECTION INTRAVENOUS at 08:11

## 2024-03-15 RX ADMIN — PRAVASTATIN SODIUM 10 MG: 10 TABLET ORAL at 08:11

## 2024-03-15 RX ADMIN — CETIRIZINE HYDROCHLORIDE 10 MG: 10 TABLET ORAL at 08:10

## 2024-03-15 RX ADMIN — Medication 2 PUFF: at 07:57

## 2024-03-15 RX ADMIN — ACETAMINOPHEN 650 MG: 325 TABLET ORAL at 08:16

## 2024-03-15 RX ADMIN — HYDROCODONE BITARTRATE AND HOMATROPINE METHYLBROMIDE 5 ML: 5; 1.5 SOLUTION ORAL at 08:16

## 2024-03-15 RX ADMIN — ACETAMINOPHEN 650 MG: 325 TABLET ORAL at 14:24

## 2024-03-15 RX ADMIN — OSELTAMIVIR PHOSPHATE 75 MG: 75 CAPSULE ORAL at 08:10

## 2024-03-15 RX ADMIN — POLYETHYLENE GLYCOL 3350 17 G: 17 POWDER, FOR SOLUTION ORAL at 08:25

## 2024-03-15 RX ADMIN — METHOCARBAMOL TABLETS 750 MG: 500 TABLET, COATED ORAL at 08:10

## 2024-03-15 RX ADMIN — ALBUTEROL SULFATE 2.5 MG: 2.5 SOLUTION RESPIRATORY (INHALATION) at 07:57

## 2024-03-15 RX ADMIN — PANTOPRAZOLE SODIUM 40 MG: 40 TABLET, DELAYED RELEASE ORAL at 08:10

## 2024-03-15 RX ADMIN — GABAPENTIN 100 MG: 100 CAPSULE ORAL at 08:10

## 2024-03-15 RX ADMIN — GABAPENTIN 100 MG: 100 CAPSULE ORAL at 14:20

## 2024-03-15 RX ADMIN — APIXABAN 10 MG: 5 TABLET, FILM COATED ORAL at 08:11

## 2024-03-15 ASSESSMENT — PAIN SCALES - GENERAL
PAINLEVEL_OUTOF10: 0
PAINLEVEL_OUTOF10: 0
PAINLEVEL_OUTOF10: 3
PAINLEVEL_OUTOF10: 3
PAINLEVEL_OUTOF10: 0
PAINLEVEL_OUTOF10: 3
PAINLEVEL_OUTOF10: 0

## 2024-03-15 ASSESSMENT — PAIN DESCRIPTION - ORIENTATION
ORIENTATION: MID

## 2024-03-15 ASSESSMENT — PAIN DESCRIPTION - LOCATION
LOCATION: HEAD

## 2024-03-15 ASSESSMENT — PAIN DESCRIPTION - DESCRIPTORS
DESCRIPTORS: DISCOMFORT

## 2024-03-15 NOTE — FLOWSHEET NOTE
03/14/24 2036   Vital Signs   Temp 97.5 °F (36.4 °C)   Temp Source Oral   Pulse (!) 119   Heart Rate Source Monitor   Respirations 18   BP (!) 147/77   MAP (Calculated) 100   BP Location Right upper arm   Patient Position High fowlers     Pt assessment complete.  Pt lying in bed quietly.  Lung sounds clear.  Pt on room air.  Pt ST per monitor with HR of 119.  Nightly medications given.  Denies needs at this time.  Call light with in reach.

## 2024-03-15 NOTE — PROGRESS NOTES
Pulmonary Progress Note    CC: PE    Subjective:   Still with shortness of breath on exertion  Less wheezes          No intake or output data in the 24 hours ending 03/15/24 0804      Exam:   /82   Pulse 96   Temp 97.9 °F (36.6 °C) (Oral)   Resp 18   Ht 1.651 m (5' 5\")   Wt 134.1 kg (295 lb 9.6 oz)   SpO2 96%   BMI 49.19 kg/m²  on room air  Gen: No distress.  Morbidly obese  Eyes: PERRL. No sclera icterus. No conjunctival injection.   ENT: No discharge. Pharynx clear.   Neck: Trachea midline. No obvious mass.    Resp: No accessory muscle use. No crackles.  Scattered wheezes. No rhonchi. No dullness on percussion.  CV: Regular rate. Regular rhythm. No murmur or rub.  1+ LE LE edema.   GI: Non-tender. Non-distended. No hernia.   Skin: Warm and dry. No nodule on exposed extremities.   Lymph: No cervical LAD. No supraclavicular LAD.   M/S: No cyanosis. No joint deformity. No clubbing.   Neuro: Awake. Alert. Moves all four extremities.   Psych: Oriented x 3. No anxiety.     Scheduled Meds:   methylPREDNISolone  40 mg IntraVENous BID    ipratropium 0.5 mg-albuterol 2.5 mg  1 Dose Inhalation Q4H WA RT    methocarbamol  750 mg Oral BID    gabapentin  100 mg Oral TID    apixaban  10 mg Oral BID    Followed by    [START ON 3/20/2024] apixaban  5 mg Oral BID    fluticasone  2 puff Inhalation BID RT    budesonide-formoterol  2 puff Inhalation BID RT    pravastatin  10 mg Oral Daily    sodium chloride flush  5-40 mL IntraVENous 2 times per day    insulin lispro  0-4 Units SubCUTAneous TID WC    insulin lispro  0-4 Units SubCUTAneous Nightly    montelukast  10 mg Oral QHS    pantoprazole  40 mg Oral QAM AC    cetirizine  10 mg Oral Daily    benzonatate  200 mg Oral TID    oseltamivir  75 mg Oral BID    albuterol  2.5 mg Nebulization BID RT     Continuous Infusions:   dextrose      sodium chloride       PRN Meds:  iopamidol, glucose, dextrose bolus **OR** dextrose bolus, glucagon (rDNA), dextrose, sodium chloride

## 2024-03-15 NOTE — DISCHARGE SUMMARY
Name:  Pauline Cordero  Room:  /0320-01  MRN:    [id number]    Discharge Summary      This discharge summary is in conjunction with a complete physical exam done on the day of discharge.    Discharging Physician:        Admit: 3/12/2024  Discharge:  3/15/2024    HPI taken from admission H&P:      The patient is a 43 y.o. female with pmhx as below who presents to [facility] with chest pain  Pt reports she has varicose veins in both LE, had sclerotherapy done 6 months ago and developed cellulitis about a month ago and felt a thick cord like vein. Was admitted to [facility] for cellulitis but no venous doppler done. Cellulitis improved with abx .. Pt was recently admitted multiple times for asthma exacerbation and recently dcd 2 days ago after tx for flu and possible pna  Since dc she continues to have left sided chest pain with sob and comes back for workup and CTA chest now with right sided PE and also noted to have tachycardia    Diagnoses this Admission and Hospital Course       #Acute Pulmonary Embolism   # Acute right lower extremity DVT  - CTPE as above, no evidence of heart strain   -Lower extremity Dopplers positive for right leg DVT  - started o full dose anticoagulationn  lovenox 1 mg/kg -> transitioned to Eliquis.  Eliquis is covered by her insurance and she already has the medications through meds to beds.  - stable on room air  - pulmonary consulted and following  -Patient has been advised that she will be discharged home on Eliquis ;   Discussed with patient -she will need at least 3 months of anticoagulation and then further follow-up with primary physician regarding outpatient hypercoag workup ; she will also need age-appropriate cancer screening.      # Sinus tachycardia  - 2/2 above  - EKG without acute ischemic changes  - Trop neg x 2  - monitor on telemetry   - Hydrated,  anticoagulated as above.  Tachycardia has resolved     #Hypokalemia  -Replaced .  - monitor      #Leukocytosis  -  every morning (before breakfast)     pravastatin 10 MG tablet  Commonly known as: PRAVACHOL  Take 1 tablet by mouth daily     [facility]            STOP taking these medications      azithromycin 250 MG tablet  Commonly known as: Zithromax     oseltamivir 75 MG capsule  Commonly known as: TAMIFLU               Where to Get Your Medications        These medications were sent to [facility]  - [place] - [address] - P [phone] - F [phone]  [address], [facility] [address]      Phone: [phone]   ipratropium 0.5 mg-albuterol 2.5 mg 0.5-2.5 (3) MG/3ML Soln nebulizer solution  predniSONE 10 MG tablet           Discharged in stable condition to home     Follow Up:  Follow up with PCP in 1 week and pulmonary

## 2024-03-15 NOTE — FLOWSHEET NOTE
03/15/24 0014   Vital Signs   Temp 98 °F (36.7 °C)   Temp Source Oral   Pulse (!) 105   Heart Rate Source Monitor   Respirations 18   BP (!) 147/76   MAP (Calculated) 100   BP Location Right upper arm   BP Method Automatic   Patient Position Semi fowlers   Oxygen Therapy   SpO2 95 %   O2 Device None (Room air)     Pt reassessment complete.  No changes noted.  Denies needs. Call light within reach.

## 2024-03-15 NOTE — PROGRESS NOTES
Call placed to Pulmonology to inform Dr. Galeana that IV has sandra removed for discharge, to clarify if he would like PO Lasix.   Patient resting in bed Telemetry monitor has been removed as well.     UPDATE: Dr. Galeana called back and stated not to give lasix and was okay for discharge.

## 2024-03-15 NOTE — PROGRESS NOTES
DC Instructions have been reviewed with patient and spouse with a verbal understanding. Patient denied any needs prior to leaving the facility by wheelchair.

## 2024-03-15 NOTE — PROGRESS NOTES
IM Progress Note    Admit Date:  3/12/2024    Patient admitted with acute right lower extremity DVT and pulmonary embolism. On anticoagulation.  Seen by pulmonology.  Has underlying asthma/COPD, has had recurrent exacerbations of the same.   Recently treated for influenza      Subjective:  Ms. Cordero continues to feel very ill .  Complains of feeling achy all over   Complaints of shortness of breath and wheezing, feels dyspneic .  Oxygen saturations maintained on room air .   No chest pains today.  Tachycardia resolved.      Objective:   BP (!) 132/90   Pulse 100   Temp 98.3 °F (36.8 °C) (Oral)   Resp 18   Ht 1.651 m (5' 5\")   Wt 134.1 kg (295 lb 9.6 oz)   SpO2 98%   BMI 49.19 kg/m²   No intake or output data in the 24 hours ending 03/15/24 1417        Physical Exam:  General:  Awake, alert, NAD  Patient looks ill and fatigued   well-oriented and in no distress  Skin:  Warm and dry  Neck:  JVD absent. Neck supple  Chest: Diminished breath sounds with bilateral mild wheezes  Cardiovascular:  RRR ,S1S2 normal  Abdomen:  Soft, non tender, non distended, BS +  Extremities:  No edema.  Intact peripheral pulses. Brisk cap refill, < 2 secs  Neuro: non focal      Medications:   Scheduled Meds:   ipratropium 0.5 mg-albuterol 2.5 mg  1 Dose Inhalation 4x Daily RT    methylPREDNISolone  40 mg IntraVENous BID    methocarbamol  750 mg Oral BID    gabapentin  100 mg Oral TID    apixaban  10 mg Oral BID    Followed by    [START ON 3/20/2024] apixaban  5 mg Oral BID    fluticasone  2 puff Inhalation BID RT    budesonide-formoterol  2 puff Inhalation BID RT    pravastatin  10 mg Oral Daily    sodium chloride flush  5-40 mL IntraVENous 2 times per day    insulin lispro  0-4 Units SubCUTAneous TID WC    insulin lispro  0-4 Units SubCUTAneous Nightly    montelukast  10 mg Oral QHS    pantoprazole  40 mg Oral QAM AC    cetirizine  10 mg Oral Daily    benzonatate  200 mg Oral TID    oseltamivir  75 mg Oral BID    albuterol  2.5 mg

## 2024-03-15 NOTE — CARE COORDINATION
Met with pt at bedside. Pt has no DC or DME needs at this time. Pt states family will provide transport home. Pt was worried about eliquis cost. It was run through insurance and her co-pay is $0.00. pt is pleased with this.

## 2024-03-15 NOTE — DISCHARGE INSTRUCTIONS
Pulmonary Embolism: Care Instructions  Overview     Pulmonary embolism is the sudden blockage of an artery in the lung. Blood clots in the deep veins of the leg or pelvis (deep vein thrombosis, or DVT) are the most common cause. These blood clots can travel to the lungs.  Pulmonary embolism can be very serious. Because you have had one pulmonary embolism, you are at greater risk for having another one. But you can take steps to prevent another pulmonary embolism.  You will probably take a prescription blood-thinning medicine to prevent blood clots. A blood thinner can stop a blood clot from growing larger and prevent new clots from forming.  Follow-up care is a key part of your treatment and safety. Be sure to make and go to all appointments, and call your doctor if you are having problems. It's also a good idea to know your test results and keep a list of the medicines you take.  How can you care for yourself at home?  Take your medicines exactly as prescribed. Call your doctor if you think you are having a problem with your medicine. You will get more details on the specific medicines your doctor prescribes.  If you are taking a blood thinner, be sure you get instructions about how to take your medicine safely. Blood thinners can cause serious bleeding problems.  Try to walk several times a day. Walking helps keep blood moving in your legs. Before doing other types of exercise, ask your doctor what type and level of exercise is safe for you.  Take steps to help prevent blood clots in your legs. For example:  Exercise your lower leg muscles if you sit for long periods of time. Pump your feet up and down by pulling your toes up toward your knees then pointing them down. Repeat.  After an illness or surgery, try to get up and out of bed often. If you can't get out of bed, flex your feet every hour to keep the blood moving through your legs.  Take plenty of breaks when you travel. On long car trips, stop the car  Follow up with ortho for fracture managment your doctor about stop-smoking programs and medicines. These can increase your chances of quitting for good.  When should you call for help?   Call 911 anytime you think you may need emergency care. For example, call if:    You have severe trouble breathing.     You have severe chest pain, or chest pain is quickly getting worse.   Call your doctor now or seek immediate medical care if:    You have new or worse trouble breathing.     Your coughing or wheezing gets worse.     You cough up dark brown or bloody mucus (sputum).     You have a new or higher fever.   Watch closely for changes in your health, and be sure to contact your doctor if:    You notice more mucus or a change in the color of your mucus.     You need to use your antibiotic or steroid pills.     You do not get better as expected.   Where can you learn more?  Go to https://www.[employer].net/patientEd and enter D989 to learn more about \"Chronic Obstructive Pulmonary Disease (COPD) Flare-Ups: Care Instructions.\"  Current as of: August 6, 2023               Content Version: 14.0  © 2006-2024 [employer].   Care instructions adapted under license by [employer]. If you have questions about a medical condition or this instruction, always ask your healthcare professional. [employer] disclaims any warranty or liability for your use of this information.

## 2024-03-16 LAB
BACTERIA BLD CULT ORG #2: NORMAL
BACTERIA BLD CULT: NORMAL

## 2024-03-18 RX ORDER — BUDESONIDE AND FORMOTEROL FUMARATE DIHYDRATE 160; 4.5 UG/1; UG/1
2 AEROSOL RESPIRATORY (INHALATION) 2 TIMES DAILY
Qty: 30.6 G | Refills: 1 | Status: SHIPPED | OUTPATIENT
Start: 2024-03-18

## 2024-03-18 NOTE — TELEPHONE ENCOUNTER
From: Pauline Cordero  To: Dr. Nakia Burgos  Sent: 3/15/2024  4:59 PM EDT  Subject: Inhalers    Don’t remember what you called me and said I’m sorry. Which inhalers do I use now? I was sent home to continue therapy.

## 2024-03-25 ENCOUNTER — APPOINTMENT (OUTPATIENT)
Dept: CT IMAGING | Age: 44
End: 2024-03-25
Payer: COMMERCIAL

## 2024-03-25 ENCOUNTER — TELEPHONE (OUTPATIENT)
Dept: PULMONOLOGY | Age: 44
End: 2024-03-25

## 2024-03-25 ENCOUNTER — HOSPITAL ENCOUNTER (EMERGENCY)
Age: 44
Discharge: HOME | End: 2024-03-25
Payer: COMMERCIAL

## 2024-03-25 VITALS
OXYGEN SATURATION: 96 % | TEMPERATURE: 98.1 F | BODY MASS INDEX: 47.43 KG/M2 | HEART RATE: 99 BPM | SYSTOLIC BLOOD PRESSURE: 139 MMHG | DIASTOLIC BLOOD PRESSURE: 82 MMHG | RESPIRATION RATE: 20 BRPM | WEIGHT: 285 LBS

## 2024-03-25 DIAGNOSIS — I26.99 ACUTE PULMONARY EMBOLISM WITHOUT ACUTE COR PULMONALE, UNSPECIFIED PULMONARY EMBOLISM TYPE (HCC): ICD-10-CM

## 2024-03-25 DIAGNOSIS — R06.09 DYSPNEA ON EXERTION: Primary | ICD-10-CM

## 2024-03-25 LAB
ALBUMIN SERPL-MCNC: 3.8 G/DL (ref 3.4–5)
ALBUMIN/GLOB SERPL: 1.4 {RATIO} (ref 1.1–2.2)
ALP SERPL-CCNC: 89 U/L (ref 40–129)
ALT SERPL-CCNC: 27 U/L (ref 10–40)
ANION GAP SERPL CALCULATED.3IONS-SCNC: 14 MMOL/L (ref 3–16)
APTT BLD: 25.8 SEC (ref 22.7–35.9)
AST SERPL-CCNC: 15 U/L (ref 15–37)
BACTERIA URNS QL MICRO: ABNORMAL /HPF
BASE EXCESS BLDV CALC-SCNC: -0.7 MMOL/L (ref -3–3)
BASOPHILS # BLD: 0.1 K/UL (ref 0–0.2)
BASOPHILS NFR BLD: 0.7 %
BILIRUB SERPL-MCNC: 0.3 MG/DL (ref 0–1)
BILIRUB UR QL STRIP.AUTO: NEGATIVE
BUN SERPL-MCNC: 13 MG/DL (ref 7–20)
CALCIUM SERPL-MCNC: 9 MG/DL (ref 8.3–10.6)
CHLORIDE SERPL-SCNC: 101 MMOL/L (ref 99–110)
CLARITY UR: CLEAR
CO2 BLDV-SCNC: 24 MMOL/L
CO2 SERPL-SCNC: 22 MMOL/L (ref 21–32)
COHGB MFR BLDV: 1.2 % (ref 0–1.5)
COLOR UR: YELLOW
CREAT SERPL-MCNC: 0.6 MG/DL (ref 0.6–1.1)
DEPRECATED RDW RBC AUTO: 17.3 % (ref 12.4–15.4)
EKG ATRIAL RATE: 114 BPM
EKG DIAGNOSIS: NORMAL
EKG P AXIS: 69 DEGREES
EKG P-R INTERVAL: 138 MS
EKG Q-T INTERVAL: 324 MS
EKG QRS DURATION: 76 MS
EKG QTC CALCULATION (BAZETT): 446 MS
EKG R AXIS: -22 DEGREES
EKG T AXIS: 26 DEGREES
EKG VENTRICULAR RATE: 114 BPM
EOSINOPHIL # BLD: 0 K/UL (ref 0–0.6)
EOSINOPHIL NFR BLD: 0.1 %
EPI CELLS #/AREA URNS HPF: ABNORMAL /HPF (ref 0–5)
FLUAV RNA RESP QL NAA+PROBE: NOT DETECTED
FLUBV RNA RESP QL NAA+PROBE: NOT DETECTED
GFR SERPLBLD CREATININE-BSD FMLA CKD-EPI: >90 ML/MIN/{1.73_M2}
GLUCOSE SERPL-MCNC: 182 MG/DL (ref 70–99)
GLUCOSE UR STRIP.AUTO-MCNC: NEGATIVE MG/DL
HCO3 BLDV-SCNC: 23.1 MMOL/L (ref 23–29)
HCT VFR BLD AUTO: 40.6 % (ref 36–48)
HGB BLD-MCNC: 13.1 G/DL (ref 12–16)
HGB UR QL STRIP.AUTO: ABNORMAL
INR PPP: 1.03 (ref 0.84–1.16)
KETONES UR STRIP.AUTO-MCNC: NEGATIVE MG/DL
LEUKOCYTE ESTERASE UR QL STRIP.AUTO: NEGATIVE
LYMPHOCYTES # BLD: 1.3 K/UL (ref 1–5.1)
LYMPHOCYTES NFR BLD: 8.7 %
MCH RBC QN AUTO: 25.1 PG (ref 26–34)
MCHC RBC AUTO-ENTMCNC: 32.2 G/DL (ref 31–36)
MCV RBC AUTO: 78.1 FL (ref 80–100)
METHGB MFR BLDV: 0 %
MONOCYTES # BLD: 0.5 K/UL (ref 0–1.3)
MONOCYTES NFR BLD: 3.1 %
NEUTROPHILS # BLD: 13.5 K/UL (ref 1.7–7.7)
NEUTROPHILS NFR BLD: 87.4 %
NITRITE UR QL STRIP.AUTO: NEGATIVE
O2 CT VFR BLDV CALC: 19 VOL %
O2 THERAPY: ABNORMAL
PCO2 BLDV: 35.7 MMHG (ref 40–50)
PH BLDV: 7.43 [PH] (ref 7.35–7.45)
PH UR STRIP.AUTO: 6.5 [PH] (ref 5–8)
PLATELET # BLD AUTO: 242 K/UL (ref 135–450)
PMV BLD AUTO: 7.3 FL (ref 5–10.5)
PO2 BLDV: 93 MMHG (ref 25–40)
POTASSIUM SERPL-SCNC: 4.2 MMOL/L (ref 3.5–5.1)
PROT SERPL-MCNC: 6.6 G/DL (ref 6.4–8.2)
PROT UR STRIP.AUTO-MCNC: NEGATIVE MG/DL
PROTHROMBIN TIME: 13.5 SEC (ref 11.5–14.8)
RBC # BLD AUTO: 5.19 M/UL (ref 4–5.2)
RBC #/AREA URNS HPF: ABNORMAL /HPF (ref 0–4)
SAO2 % BLDV: 97 %
SARS-COV-2 RNA RESP QL NAA+PROBE: NOT DETECTED
SODIUM SERPL-SCNC: 137 MMOL/L (ref 136–145)
SP GR UR STRIP.AUTO: 1.01 (ref 1–1.03)
TROPONIN, HIGH SENSITIVITY: 9 NG/L (ref 0–14)
UA COMPLETE W REFLEX CULTURE PNL UR: ABNORMAL
UA DIPSTICK W REFLEX MICRO PNL UR: YES
URN SPEC COLLECT METH UR: ABNORMAL
UROBILINOGEN UR STRIP-ACNC: 0.2 E.U./DL
WBC # BLD AUTO: 15.5 K/UL (ref 4–11)
WBC #/AREA URNS HPF: ABNORMAL /HPF (ref 0–5)

## 2024-03-25 PROCEDURE — 87636 SARSCOV2 & INF A&B AMP PRB: CPT

## 2024-03-25 PROCEDURE — 80053 COMPREHEN METABOLIC PANEL: CPT

## 2024-03-25 PROCEDURE — 85025 COMPLETE CBC W/AUTO DIFF WBC: CPT

## 2024-03-25 PROCEDURE — 81001 URINALYSIS AUTO W/SCOPE: CPT

## 2024-03-25 PROCEDURE — 6360000004 HC RX CONTRAST MEDICATION: Performed by: REGISTERED NURSE

## 2024-03-25 PROCEDURE — 85730 THROMBOPLASTIN TIME PARTIAL: CPT

## 2024-03-25 PROCEDURE — 85610 PROTHROMBIN TIME: CPT

## 2024-03-25 PROCEDURE — 84484 ASSAY OF TROPONIN QUANT: CPT

## 2024-03-25 PROCEDURE — 74177 CT ABD & PELVIS W/CONTRAST: CPT

## 2024-03-25 PROCEDURE — 93005 ELECTROCARDIOGRAM TRACING: CPT | Performed by: REGISTERED NURSE

## 2024-03-25 PROCEDURE — 82803 BLOOD GASES ANY COMBINATION: CPT

## 2024-03-25 PROCEDURE — 99285 EMERGENCY DEPT VISIT HI MDM: CPT

## 2024-03-25 PROCEDURE — 93010 ELECTROCARDIOGRAM REPORT: CPT | Performed by: INTERNAL MEDICINE

## 2024-03-25 PROCEDURE — 36415 COLL VENOUS BLD VENIPUNCTURE: CPT

## 2024-03-25 PROCEDURE — 71260 CT THORAX DX C+: CPT

## 2024-03-25 RX ORDER — AZITHROMYCIN 250 MG/1
TABLET, FILM COATED ORAL
Qty: 6 TABLET | Refills: 0 | Status: SHIPPED | OUTPATIENT
Start: 2024-03-25 | End: 2024-04-04

## 2024-03-25 RX ADMIN — IOPAMIDOL 100 ML: 755 INJECTION, SOLUTION INTRAVENOUS at 13:36

## 2024-03-25 ASSESSMENT — ENCOUNTER SYMPTOMS
SHORTNESS OF BREATH: 1
NAUSEA: 1
ABDOMINAL PAIN: 1
COUGH: 1
DIARRHEA: 0

## 2024-03-25 ASSESSMENT — PAIN - FUNCTIONAL ASSESSMENT: PAIN_FUNCTIONAL_ASSESSMENT: 0-10

## 2024-03-25 ASSESSMENT — PAIN DESCRIPTION - DESCRIPTORS: DESCRIPTORS: ACHING

## 2024-03-25 ASSESSMENT — PAIN SCALES - GENERAL
PAINLEVEL_OUTOF10: 5
PAINLEVEL_OUTOF10: 5

## 2024-03-25 ASSESSMENT — PAIN DESCRIPTION - LOCATION
LOCATION: RIB CAGE
LOCATION: HEAD

## 2024-03-25 ASSESSMENT — PAIN DESCRIPTION - ORIENTATION: ORIENTATION: LEFT;RIGHT

## 2024-03-25 NOTE — TELEPHONE ENCOUNTER
Pt called stating she has a P.E. and clot behind knee. Pt states she has stomach issues but has stated it has worsened x 2 weeks. She also said she fell on Friday- missed a step and tripped into recliner hitting her chest causing sob for 15 min. Pt states her face has become red and puffy after too much exertion and says o2 stays in the 90s. Only experiences lightheadedness when face is red and puffy.     She has pressure in head that makes it feel like it will pop off, severe fatigue causing pt to fall asleep and forget her blood thinner medication x 3 days. Pt has non productive cough- wheezing has improved. Pt says it hurts to take a deep breath and a constant dull achy pain in bottom of chest- pt states sometimes she will feel a sharp stabbing pain.     Pt states she just feels sick and does not feel like herself. Has vascular appt today 03-25-24. Per Dr. Burgos pt was to be evaluated in ER. Pt was informed and asked about vascular appt and I informed pt to call their office let them know what is going on but to continue to visit ER unless they give further instructions. Pt understood and asked that I inform the ER she is coming.     ER informed per pt's wishes and ED  staff stated my post it note would be attached to chart.     Follow for poss hosp f/u

## 2024-03-25 NOTE — DISCHARGE INSTRUCTIONS
Please follow up with a cardiologist of your choosing.  I have referred you to Dr. Noriega if you would like to use their service    Please present directly to the emergency department with any worsening symptoms to include chest pain, shortness of breath, palpitations    Please remember to take your Eliquis regularly as prescribed

## 2024-03-25 NOTE — ED PROVIDER NOTES
[facility] ED  EMERGENCY DEPARTMENT ENCOUNTER        Pt Name: Pauline Cordero  MRN: [id number]  Birthdate 1980  Date of evaluation: 3/25/2024  Provider: MACHO Handy - CNP  PCP: Orion Miles MD  Note Started: 12:52 PM EDT 3/25/24      ITZEL. I have evaluated this patient.        CHIEF COMPLAINT       Chief Complaint   Patient presents with    Fatigue     SOB and fatigue. States missed a couple days of blood thinner, states recent admit for PE    Shortness of Breath       HISTORY OF PRESENT ILLNESS: 1 or more Elements     History From: Patient    Limitations to history : None    Social Determinants Significantly Affecting Health : None    Chief Complaint: Shortness of breath    Pauline Cordero is a 43 y.o. female who presents to the emergency department today with several day history of shortness of breath.  She states she was previously seen on 3/12/2024 and was diagnosed with a PE and DVT of the right lower extremity.  She states that she was placed on blood thinners at that time but has been nonadherent for the past several days.  She states that she keeps forgetting to take her blood thinners (Eliquis) and states that she had worsening shortness of breath, cough, palpitations, and tachycardia.  She states that the pain in her right lower extremity has also worsened and she is concerned for the possibility of worsening blood clots.  She states that she has not been experiencing any chest pain but has been experiencing left upper quadrant abdominal pain.  She does report a moderate amount of activity intolerance due to the shortness of breath.  She states that she has had no known exposures to known illnesses that she is aware of but does report a cough and slight congestion.  She states that she has not been experiencing any fever, diarrhea, head, neck, or back pain.  She states she is generally healthy when at her baseline otherwise with no other acute medical concerns at          Patient was given the following medications:  Medications   iopamidol (ISOVUE-370) 76 % injection 100 mL (100 mLs IntraVENous Given 3/25/24 1336)       ED Course as of 03/31/24 1238   Mon Mar 25, 2024   1227 Presents for possible \"blood clot in lung\". States that she was seen here several weeks ago and was found to have PE and DVT. States that she has been experiencing worsening shortness of breath for the past 3 days. States that she has missed her blood thinners for the past several days. Reports shortness of breath at this time. [NM]      ED Course User Index  [NM] Pilo Vila, APRN - CNP        Is this patient to be included in the SEP-1 Core Measure due to severe sepsis or septic shock?   No   Exclusion criteria - the patient is NOT to be included for SEP-1 Core Measure due to:  2+ SIRS criteria are not met        Chronic Conditions affecting care: none   has a past medical history of Anesthesia complication, Anxiety and depression, Arthritis, Asthma, Chronic bronchitis (HCC), COPD (chronic obstructive pulmonary disease) (HCC), Diabetes mellitus (HCC), Fibromyalgia, blood clots, Hyperlipidemia, Hypertension, Migraine, Neuropathy, Pneumonia, Rash, Reflux, Sjogren's disease (HCC), Sleep apnea, Thyroid disease, and Wears glasses.    CONSULTS: (Who and What was discussed)  None      Records Reviewed (External and Source) none    CC/HPI Summary, DDx, ED Course, and Reassessment: Patrizia is a generally well-appearing 43-year-old female presents to the emergency department today for possible blood clot in her lung.  She states that she was seen here several weeks ago and was found to have a PE and DVT and states that she has been experiencing worsening shortness of breath for the past 3 days.  She states that she has missed her blood thinners for the past several days and notes that she tends to not take these blood thinners when she is \"tired\".  She states that she is experiencing some degree of

## 2024-03-26 ENCOUNTER — TELEPHONE (OUTPATIENT)
Dept: CARDIOLOGY CLINIC | Age: 44
End: 2024-03-26

## 2024-03-26 NOTE — TELEPHONE ENCOUNTER
Pt contacted office to schedule hspfu, pt wanted to come in and see provider today, pt established w/ SRJ. Advised pt we schedule 2 weeks out for hspfu, pt got upset. ED referred pt to see RKG, next available for either provider is late April early may. Pt wants to be seen in less than 2 weeks. Please advise.

## 2024-03-26 NOTE — TELEPHONE ENCOUNTER
Attempted to call patient to schedule appt: If she is okay to go to [facility] we can book her on 4/4/2024 at 845,945 or 11. Okay to overbook per me RNAT. Thank you.     Unable to LV due to VM not being set up.

## 2024-03-29 NOTE — TELEPHONE ENCOUNTER
Pt called back stating that she is sick again.  Pt states that we have been waiting to do a bronch for a while and now she is sick again.  Has a lot of pressure in her head. Pt is going to start back on neb treatments.     Do you have the following symptoms?  Shortness of Breath  no  Wheezing  sometimes  Cough  yes  Cough Characteristics:  Productive    no  Sputum Color    n/a  Hemoptysis   n/a  Consistency of sputum   n/a     Fever:    no    Temp:n/a  Chills/Sweats:  no  What other symptoms are you having?:  head congestion, sore throat, headache, oxygen is staying fine    How long have you had these symptoms? 5 days     Pharmacy: [facility]           Review medications and allergies:        Allergies?    Allergies   Allergen Reactions    Latex Rash    Dupixent [Dupilumab]     Other Dermatitis     BANDAIDS, ADHESIVES     Oxycodone Hcl Other (See Comments)    Penicillins Swelling    Tetracycline Other (See Comments)    Adhesive Tape Rash    Morphine And Related Other (See Comments)     nausea and vomiting      Percocet [Oxycodone-Acetaminophen] Rash    Sulfa Antibiotics Rash             Currently on Antibiotics?  (Drug/Dose/Frequency and how long on?) Zothromax 250 mg-has 1 dose left        Systemic Steroids?  (Drug/Dose/Frequency and how long on?) on a prednisone taper dose currently

## 2024-03-29 NOTE — TELEPHONE ENCOUNTER
Pt stated she is going to the ER. She is refusing. Pt stated \"it is not like that\" Pt would like to know what else she can

## 2024-04-03 ENCOUNTER — OFFICE VISIT (OUTPATIENT)
Dept: PULMONOLOGY | Age: 44
End: 2024-04-03
Payer: COMMERCIAL

## 2024-04-03 VITALS
OXYGEN SATURATION: 96 % | RESPIRATION RATE: 19 BRPM | SYSTOLIC BLOOD PRESSURE: 144 MMHG | HEART RATE: 134 BPM | WEIGHT: 285 LBS | HEIGHT: 65 IN | DIASTOLIC BLOOD PRESSURE: 68 MMHG | BODY MASS INDEX: 47.48 KG/M2

## 2024-04-03 DIAGNOSIS — J45.909 UNCOMPLICATED ASTHMA, UNSPECIFIED ASTHMA SEVERITY, UNSPECIFIED WHETHER PERSISTENT: Primary | ICD-10-CM

## 2024-04-03 PROCEDURE — 99214 OFFICE O/P EST MOD 30 MIN: CPT | Performed by: INTERNAL MEDICINE

## 2024-04-03 PROCEDURE — G8417 CALC BMI ABV UP PARAM F/U: HCPCS | Performed by: INTERNAL MEDICINE

## 2024-04-03 PROCEDURE — 3078F DIAST BP <80 MM HG: CPT | Performed by: INTERNAL MEDICINE

## 2024-04-03 PROCEDURE — G8427 DOCREV CUR MEDS BY ELIG CLIN: HCPCS | Performed by: INTERNAL MEDICINE

## 2024-04-03 PROCEDURE — 1111F DSCHRG MED/CURRENT MED MERGE: CPT | Performed by: INTERNAL MEDICINE

## 2024-04-03 PROCEDURE — 3077F SYST BP >= 140 MM HG: CPT | Performed by: INTERNAL MEDICINE

## 2024-04-03 PROCEDURE — 1036F TOBACCO NON-USER: CPT | Performed by: INTERNAL MEDICINE

## 2024-04-03 RX ORDER — TIOTROPIUM BROMIDE INHALATION SPRAY 1.56 UG/1
2 SPRAY, METERED RESPIRATORY (INHALATION) DAILY
Qty: 1 EACH | Refills: 0 | Status: SHIPPED | COMMUNITY
Start: 2024-04-03

## 2024-04-03 RX ORDER — FUROSEMIDE 20 MG/1
20 TABLET ORAL
Qty: 15 TABLET | Refills: 0 | Status: SHIPPED | OUTPATIENT
Start: 2024-04-03

## 2024-04-03 RX ORDER — AZITHROMYCIN 250 MG/1
250 TABLET, FILM COATED ORAL DAILY
Qty: 7 TABLET | Refills: 0 | Status: SHIPPED | OUTPATIENT
Start: 2024-04-03 | End: 2024-04-18

## 2024-04-03 RX ORDER — TIOTROPIUM BROMIDE INHALATION SPRAY 1.56 UG/1
2 SPRAY, METERED RESPIRATORY (INHALATION) DAILY
Qty: 1 EACH | Refills: 2 | Status: SHIPPED | OUTPATIENT
Start: 2024-04-03

## 2024-04-03 NOTE — PROGRESS NOTES
P  [facility]                                               Pulmonary Clinic Consult     I had the pleasure of seeing  Pauline Cordero     Chief Complaint   Patient presents with    Follow-Up from Hospital       HISTORY OF PRESENT ILLNESS:        Interval History: 41 y.o. female The pt had COVID 19 last Jan 2022 with significant cough and SOB however it is better .   Still chewing nicotine gum 1 pack day ,she quit 2015     She states she still has some SOB and PEOPLES and she has some cough  and Peoples    SHE HAD diaphragm paralysis left side    She can 1/2 block ,abpout 2 00 feet     She recently broke /twist November .towrn ligament s/p surgery   She use xopenex as needed and she needed 2-3 time week  She use nebulizer   She use dulera    She developed allergy   No chest paoin ,no Hemoptysis     Today visit  Still with SOB and cough with some mucus   Still with some cough and sob   Not able to get her sputum out  No Chest pain   Varicose procedure end of 2023 LE 2024   Has now swelling both legs   She has been doing fair  Still with SOB and cough and wheezing   No fever      ALLERGIES:    Allergies   Allergen Reactions    Latex Rash    Dupixent [Dupilumab]     Other Dermatitis     BANDAIDS, ADHESIVES     Oxycodone Hcl Other (See Comments)    Penicillins Swelling    Tetracycline Other (See Comments)    Adhesive Tape Rash    Morphine And Related Other (See Comments)     nausea and vomiting      Percocet [Oxycodone-Acetaminophen] Rash    Sulfa Antibiotics Rash       PAST MEDICAL HISTORY:       Diagnosis Date    Anesthesia complication     severe hypotension with block    Anxiety and depression     Arthritis     Asthma     Chronic bronchitis (HCC)     COPD (chronic obstructive pulmonary disease) (HCC)     Diabetes mellitus (HCC)     Fibromyalgia     Hx of blood clots     Hyperlipidemia     Hypertension     Migraine     Neuropathy     Pneumonia     Rash     Reflux     Sjogren's

## 2024-04-04 ENCOUNTER — TELEPHONE (OUTPATIENT)
Dept: CARDIOLOGY CLINIC | Age: 44
End: 2024-04-04

## 2024-04-04 ENCOUNTER — OFFICE VISIT (OUTPATIENT)
Dept: CARDIOLOGY CLINIC | Age: 44
End: 2024-04-04

## 2024-04-04 ENCOUNTER — TELEPHONE (OUTPATIENT)
Dept: PULMONOLOGY | Age: 44
End: 2024-04-04

## 2024-04-04 VITALS
BODY MASS INDEX: 48.82 KG/M2 | DIASTOLIC BLOOD PRESSURE: 64 MMHG | WEIGHT: 293 LBS | HEIGHT: 65 IN | SYSTOLIC BLOOD PRESSURE: 116 MMHG | OXYGEN SATURATION: 97 % | HEART RATE: 115 BPM

## 2024-04-04 DIAGNOSIS — R00.0 SINUS TACHYCARDIA: ICD-10-CM

## 2024-04-04 DIAGNOSIS — E78.5 HYPERLIPIDEMIA, UNSPECIFIED HYPERLIPIDEMIA TYPE: ICD-10-CM

## 2024-04-04 DIAGNOSIS — I10 ESSENTIAL (PRIMARY) HYPERTENSION: Primary | ICD-10-CM

## 2024-04-04 DIAGNOSIS — R06.02 SHORTNESS OF BREATH: ICD-10-CM

## 2024-04-04 DIAGNOSIS — Z79.899 MEDICATION MANAGEMENT: ICD-10-CM

## 2024-04-04 DIAGNOSIS — I82.4Y1 ACUTE DEEP VEIN THROMBOSIS (DVT) OF PROXIMAL VEIN OF RIGHT LOWER EXTREMITY (HCC): ICD-10-CM

## 2024-04-04 DIAGNOSIS — R53.83 OTHER FATIGUE: ICD-10-CM

## 2024-04-04 DIAGNOSIS — I26.99 ACUTE PULMONARY EMBOLISM, UNSPECIFIED PULMONARY EMBOLISM TYPE, UNSPECIFIED WHETHER ACUTE COR PULMONALE PRESENT (HCC): ICD-10-CM

## 2024-04-04 LAB
ANION GAP SERPL CALCULATED.3IONS-SCNC: 11 MMOL/L (ref 3–16)
BASOPHILS # BLD: 0.1 K/UL (ref 0–0.2)
BASOPHILS NFR BLD: 1.2 %
BUN SERPL-MCNC: 11 MG/DL (ref 7–20)
CALCIUM SERPL-MCNC: 9.3 MG/DL (ref 8.3–10.6)
CHLORIDE SERPL-SCNC: 104 MMOL/L (ref 99–110)
CO2 SERPL-SCNC: 26 MMOL/L (ref 21–32)
CREAT SERPL-MCNC: 0.6 MG/DL (ref 0.6–1.1)
DEPRECATED RDW RBC AUTO: 17 % (ref 12.4–15.4)
EOSINOPHIL # BLD: 0.1 K/UL (ref 0–0.6)
EOSINOPHIL NFR BLD: 1.1 %
GFR SERPLBLD CREATININE-BSD FMLA CKD-EPI: >90 ML/MIN/{1.73_M2}
GLUCOSE SERPL-MCNC: 127 MG/DL (ref 70–99)
HCT VFR BLD AUTO: 39.6 % (ref 36–48)
HGB BLD-MCNC: 12.6 G/DL (ref 12–16)
LYMPHOCYTES # BLD: 2 K/UL (ref 1–5.1)
LYMPHOCYTES NFR BLD: 17.5 %
MCH RBC QN AUTO: 25.2 PG (ref 26–34)
MCHC RBC AUTO-ENTMCNC: 31.8 G/DL (ref 31–36)
MCV RBC AUTO: 79.2 FL (ref 80–100)
MONOCYTES # BLD: 0.5 K/UL (ref 0–1.3)
MONOCYTES NFR BLD: 4.6 %
NEUTROPHILS # BLD: 8.7 K/UL (ref 1.7–7.7)
NEUTROPHILS NFR BLD: 75.6 %
NT-PROBNP SERPL-MCNC: <36 PG/ML (ref 0–124)
PLATELET # BLD AUTO: 283 K/UL (ref 135–450)
PMV BLD AUTO: 8.1 FL (ref 5–10.5)
POTASSIUM SERPL-SCNC: 3.7 MMOL/L (ref 3.5–5.1)
RBC # BLD AUTO: 4.99 M/UL (ref 4–5.2)
SODIUM SERPL-SCNC: 141 MMOL/L (ref 136–145)
T4 FREE SERPL-MCNC: 1.1 NG/DL (ref 0.9–1.8)
TSH SERPL DL<=0.005 MIU/L-ACNC: 5.42 UIU/ML (ref 0.27–4.2)
WBC # BLD AUTO: 11.6 K/UL (ref 4–11)

## 2024-04-04 PROCEDURE — 93270 REMOTE 30 DAY ECG REV/REPORT: CPT | Performed by: INTERNAL MEDICINE

## 2024-04-04 RX ORDER — SEMAGLUTIDE 1.34 MG/ML
INJECTION, SOLUTION SUBCUTANEOUS
COMMUNITY

## 2024-04-04 NOTE — TELEPHONE ENCOUNTER
Pt called the script for Azithromycin was sent only for quantity of 7 but directions say to take one tab for 15 days. She states pharmacy([facility]) refused to fill for 15 tabs and will need a verbal clarification or new script for remaining amount

## 2024-04-04 NOTE — PATIENT INSTRUCTIONS
Recommend echocardiogram: to check for heart size and function; shortness of breath   Cardiac event monitor: chest pain, palpitations  Blood work: BMP, BNP, CBC, TSH  Continue current cardiac medications: Eliquis 5 mg, Lasix 20 mg, Pravastatin 10 mg  Call the office and let us know if your symptoms get worse; consider beta blocker / calcium channel blocker.   Follow up in 3 months with NP

## 2024-04-04 NOTE — TELEPHONE ENCOUNTER
Monitor placed by [employer], enrolled by [employer]  Monitor company [employer]  Length of monitor 14 day  Monitor ordered by [facility]  Serial number [id number]  Kit ID [id number]  Activation successful prior to pt leaving office? Yes

## 2024-04-04 NOTE — PROGRESS NOTES
EXTRACARDIAC STRUCTURES: No evidence of mediastinal or hilar lymphadenopathy. No pericardial effusion.  No cardiomegaly.  The right hemidiaphragm is elevated with bibasilar atelectasis.  Status post gastric fundoplication. Visualized osseous structures demonstrate age related degenerative changes without acute abnormality.   Total calcium score of 2 is within the 80th percentile for the patient's age of 42 y/o .   No incidental clinically important extracardiac CT findings               Cardiac MRI: 03/28/2022  CONCLUSIONS   Overall unremarkable study with normal LV/RV size and function. No evidence of myocarditis.   -Normal left ventricular size and systolic function with a calculated ejection fraction of 58% by Smith's method. -Low normal LV global longitudinal strain (GLS) -16% -Normal right ventricular size and systolic function with a calculated ejection fraction of 68% by Smith's method. -Small pericardial effusion. -No myocardial edema by T2w imaging/mapping. (Normal myocardium/skeletal ratio - normal < 1.9). -Normal myocardial resting perfusion imaging. -On delayed enhancement imaging, there is no abnormal hyperenhancement to suggest myocardial scar/inflammation/infiltration.         I have reviewed labs and imaging/xray/diagnostic testing in this note.    Assessment and Plan      1. Essential (primary) hypertension    2. Acute pulmonary embolism, unspecified pulmonary embolism type, unspecified whether acute cor pulmonale present (HCC)    3. Sinus tachycardia    4. Acute deep vein thrombosis (DVT) of proximal vein of right lower extremity (HCC)    5. Hyperlipidemia, unspecified hyperlipidemia type    6. Other fatigue    7. Shortness of breath    8. Medication management       Plan   Recommend echocardiogram: to check for heart size and function; shortness of breath , PE   Cardiac event monitor: chest pain, palpitations  Blood work: BMP, BNP, CBC, TSH  Continue current cardiac medications: Eliquis 5 mg,

## 2024-04-05 ENCOUNTER — TELEPHONE (OUTPATIENT)
Dept: CARDIOLOGY CLINIC | Age: 44
End: 2024-04-05

## 2024-04-05 DIAGNOSIS — E07.9 THYROID CONDITION: Primary | ICD-10-CM

## 2024-04-05 RX ORDER — AZITHROMYCIN 250 MG/1
250 TABLET, FILM COATED ORAL DAILY
Qty: 8 TABLET | Refills: 0 | Status: SHIPPED | OUTPATIENT
Start: 2024-04-05 | End: 2024-04-17

## 2024-04-05 NOTE — TELEPHONE ENCOUNTER
----- Message from Javon Herrmann MD sent at 4/4/2024  5:32 PM EDT -----  Let patient know their cbc test is stable, continue current meds, no new orders or changes at this time.  Thanks.

## 2024-04-05 NOTE — TELEPHONE ENCOUNTER
----- Message from Javon Herrmann MD sent at 4/4/2024  9:51 PM EDT -----  Let patient know their thyroid test is abnormal, rec: f/u w/ pcp for this and referral to endocrinology.  Thanks.

## 2024-04-15 ENCOUNTER — TELEPHONE (OUTPATIENT)
Dept: CARDIOLOGY CLINIC | Age: 44
End: 2024-04-15

## 2024-04-15 RX ORDER — FUROSEMIDE 20 MG/1
20 TABLET ORAL
Qty: 15 TABLET | Refills: 0 | Status: SHIPPED | OUTPATIENT
Start: 2024-04-15

## 2024-04-15 NOTE — TELEPHONE ENCOUNTER
Patient asking what medications to hold for stress test. No medications needing to be held may take as prescribed with sip of water, frandy MENSAH.

## 2024-04-16 ENCOUNTER — HOSPITAL ENCOUNTER (OUTPATIENT)
Dept: NON INVASIVE DIAGNOSTICS | Age: 44
Discharge: HOME | End: 2024-04-16
Payer: COMMERCIAL

## 2024-04-16 DIAGNOSIS — R06.02 SHORTNESS OF BREATH: ICD-10-CM

## 2024-04-16 PROCEDURE — 3430000000 HC RX DIAGNOSTIC RADIOPHARMACEUTICAL: Performed by: INTERNAL MEDICINE

## 2024-04-16 PROCEDURE — 78452 HT MUSCLE IMAGE SPECT MULT: CPT

## 2024-04-16 PROCEDURE — 6360000002 HC RX W HCPCS: Performed by: INTERNAL MEDICINE

## 2024-04-16 PROCEDURE — 93017 CV STRESS TEST TRACING ONLY: CPT

## 2024-04-16 PROCEDURE — A9502 TC99M TETROFOSMIN: HCPCS | Performed by: INTERNAL MEDICINE

## 2024-04-16 RX ORDER — REGADENOSON 0.08 MG/ML
0.4 INJECTION, SOLUTION INTRAVENOUS
Status: COMPLETED | OUTPATIENT
Start: 2024-04-16 | End: 2024-04-16

## 2024-04-16 RX ADMIN — REGADENOSON 0.4 MG: 0.08 INJECTION, SOLUTION INTRAVENOUS at 11:14

## 2024-04-16 RX ADMIN — TETROFOSMIN 10 MILLICURIE: 1.38 INJECTION, POWDER, LYOPHILIZED, FOR SOLUTION INTRAVENOUS at 11:15

## 2024-04-17 ENCOUNTER — HOSPITAL ENCOUNTER (OUTPATIENT)
Dept: NON INVASIVE DIAGNOSTICS | Age: 44
Discharge: HOME | End: 2024-04-17
Payer: COMMERCIAL

## 2024-04-17 ENCOUNTER — TELEPHONE (OUTPATIENT)
Dept: CARDIOLOGY CLINIC | Age: 44
End: 2024-04-17

## 2024-04-17 PROCEDURE — A9502 TC99M TETROFOSMIN: HCPCS | Performed by: INTERNAL MEDICINE

## 2024-04-17 PROCEDURE — 3430000000 HC RX DIAGNOSTIC RADIOPHARMACEUTICAL: Performed by: INTERNAL MEDICINE

## 2024-04-17 PROCEDURE — 78452 HT MUSCLE IMAGE SPECT MULT: CPT

## 2024-04-17 RX ORDER — AZITHROMYCIN 250 MG/1
250 TABLET, FILM COATED ORAL DAILY
Qty: 15 TABLET | Refills: 0 | OUTPATIENT
Start: 2024-04-17 | End: 2024-04-24

## 2024-04-17 RX ADMIN — TETROFOSMIN 30 MILLICURIE: 1.38 INJECTION, POWDER, LYOPHILIZED, FOR SOLUTION INTRAVENOUS at 09:30

## 2024-04-17 NOTE — TELEPHONE ENCOUNTER
----- Message from Javon Herrmann MD sent at 4/17/2024  2:40 PM EDT -----  Let patient know their stress test shows nml ht function, continue current meds, no new orders or changes at this time.  Thanks.

## 2024-04-17 NOTE — TELEPHONE ENCOUNTER
Created telephone encounter. Spoke with Pauline relayed message per SRJ regarding stress test. Pt verbalized understanding.

## 2024-04-17 NOTE — TELEPHONE ENCOUNTER
Dr. Burgos confirmed pt is to have abx for 15 days.     Called [facility] on file Spoke w/ Caryn and gave verbal orders for azithromycin 250 mg as qty 15 take 1 tab daily by mouth for 15 days. Pharmacist understood and will fill rx.     Called [phone] (home)   Spoke w/ pt and informed Rx has been called in and avail for . Pt voiced understanding. Pt also asking if abx should be taken every other day? Informed pt I would verify and call back.

## 2024-04-18 DIAGNOSIS — J45.909 UNCOMPLICATED ASTHMA, UNSPECIFIED ASTHMA SEVERITY, UNSPECIFIED WHETHER PERSISTENT: ICD-10-CM

## 2024-04-19 LAB
ANION GAP SERPL CALCULATED.3IONS-SCNC: 12 MMOL/L (ref 3–16)
BUN SERPL-MCNC: 13 MG/DL (ref 7–20)
CALCIUM SERPL-MCNC: 9.5 MG/DL (ref 8.3–10.6)
CHLORIDE SERPL-SCNC: 104 MMOL/L (ref 99–110)
CO2 SERPL-SCNC: 25 MMOL/L (ref 21–32)
CREAT SERPL-MCNC: 0.6 MG/DL (ref 0.6–1.1)
ENA JO1 AB SER IA-ACNC: <0.2 AI (ref 0–0.9)
ENA SS-A AB SER IA-ACNC: <0.2 AI (ref 0–0.9)
ENA SS-B AB SER IA-ACNC: 0.2 AI (ref 0–0.9)
GFR SERPLBLD CREATININE-BSD FMLA CKD-EPI: >90 ML/MIN/{1.73_M2}
GLUCOSE SERPL-MCNC: 100 MG/DL (ref 70–99)
IGA SERPL-MCNC: 426 MG/DL (ref 70–400)
IGG SERPL-MCNC: 1270 MG/DL (ref 700–1600)
IGM SERPL-MCNC: 147 MG/DL (ref 40–230)
POTASSIUM SERPL-SCNC: 3.8 MMOL/L (ref 3.5–5.1)
SODIUM SERPL-SCNC: 141 MMOL/L (ref 136–145)

## 2024-04-20 LAB
IGG1 SER-MCNC: 900 MG/DL (ref 240–1118)
IGG2 SER-MCNC: 206 MG/DL (ref 124–549)
IGG3 SER-MCNC: 76 MG/DL (ref 21–134)
IGG4 SER-MCNC: 43 MG/DL (ref 1–123)

## 2024-04-23 ENCOUNTER — TELEPHONE (OUTPATIENT)
Dept: CARDIOLOGY CLINIC | Age: 44
End: 2024-04-23

## 2024-04-23 PROCEDURE — 93272 ECG/REVIEW INTERPRET ONLY: CPT | Performed by: INTERNAL MEDICINE

## 2024-04-23 NOTE — TELEPHONE ENCOUNTER
Spoke to pt.  Advised pt that insurance denied ECHO.  Scheduled pt with NPDE on 5/1 to review things further per srj

## 2024-04-23 NOTE — TELEPHONE ENCOUNTER
Let pt know echo was denied by insurance.  Unfortunately, will need to cancel it.  Suggest f/u w/ NP in OV to review things further. Thank you.

## 2024-04-23 NOTE — TELEPHONE ENCOUNTER
ECHO denied please advise. Please reschedule or cancel patient based on physican's advise.    Tracking#[id number]    Member Name: Pauline Cordero Member ID Number: [id number] Member YOB: 1980 Service/Procedure: Transthoracic Echocardiogram Units: 1 Date(s) of Service: April 15, 2024 to June 14, 2024     Dear Dr. Javon Herrmann: We recently received a request for medical services for a [facility] member. After review by a Clinical Peer Reviewer, it was determined that the requested service(s) will not be authorized. A copy of the Member Notice of Adverse Decision Letter sent to the member is enclosed. Please review this letter or, if applicable, the English translation provided. The letter includes the rationale for our decision. Upon request, we will provide the clinical rationale or criteria and other relevant information we used in making this determination. You may also visit our health partner website at [email or web address] to locate our medical policies. You may request this information from the Utilization Management Department. If you would like to discuss this case with the Clinical Peer Reviewer, please call the Utilization Management Department at [phone], within five business days of the date of this letter. You may appeal pre-service or concurrent requests within 60 calendar days from the date of this letter. You will be notified of the determination within 48 hours of our receipt of your appeal for an expedited request or 10 calendar days for a standard request. You may dispute a postservice request within 365 calendar days from the service or 60 calendar days after the payment denial or partial denial of a timely claim submission, whichever is later. You

## 2024-04-24 ENCOUNTER — OFFICE VISIT (OUTPATIENT)
Dept: PULMONOLOGY | Age: 44
End: 2024-04-24
Payer: COMMERCIAL

## 2024-04-24 VITALS
WEIGHT: 293 LBS | SYSTOLIC BLOOD PRESSURE: 126 MMHG | OXYGEN SATURATION: 96 % | DIASTOLIC BLOOD PRESSURE: 84 MMHG | BODY MASS INDEX: 49.92 KG/M2 | HEART RATE: 103 BPM

## 2024-04-24 DIAGNOSIS — J45.909 UNCOMPLICATED ASTHMA, UNSPECIFIED ASTHMA SEVERITY, UNSPECIFIED WHETHER PERSISTENT: Primary | ICD-10-CM

## 2024-04-24 PROCEDURE — 4004F PT TOBACCO SCREEN RCVD TLK: CPT | Performed by: INTERNAL MEDICINE

## 2024-04-24 PROCEDURE — G8427 DOCREV CUR MEDS BY ELIG CLIN: HCPCS | Performed by: INTERNAL MEDICINE

## 2024-04-24 PROCEDURE — 99214 OFFICE O/P EST MOD 30 MIN: CPT | Performed by: INTERNAL MEDICINE

## 2024-04-24 PROCEDURE — G8417 CALC BMI ABV UP PARAM F/U: HCPCS | Performed by: INTERNAL MEDICINE

## 2024-04-24 PROCEDURE — 3074F SYST BP LT 130 MM HG: CPT | Performed by: INTERNAL MEDICINE

## 2024-04-24 PROCEDURE — 3079F DIAST BP 80-89 MM HG: CPT | Performed by: INTERNAL MEDICINE

## 2024-04-24 RX ORDER — POTASSIUM CHLORIDE 750 MG/1
10 TABLET, EXTENDED RELEASE ORAL 2 TIMES DAILY
Qty: 60 TABLET | Refills: 0 | Status: SHIPPED | OUTPATIENT
Start: 2024-04-24

## 2024-04-24 RX ORDER — AZITHROMYCIN 250 MG/1
500 TABLET, FILM COATED ORAL
Qty: 15 TABLET | Refills: 1 | Status: SHIPPED | OUTPATIENT
Start: 2024-04-24 | End: 2024-05-24

## 2024-04-24 RX ORDER — FUROSEMIDE 20 MG/1
20 TABLET ORAL DAILY
Qty: 30 TABLET | Refills: 0 | Status: SHIPPED | OUTPATIENT
Start: 2024-04-24

## 2024-04-24 NOTE — PROGRESS NOTES
P  [facility]                                               Pulmonary Clinic Consult     I had the pleasure of seeing  Pauline Cordero     Chief Complaint   Patient presents with    Follow-up    Results       HISTORY OF PRESENT ILLNESS:        Interval History: 41 y.o. female The pt had COVID 19 last Jan 2022 with significant cough and SOB however it is better .   Still chewing nicotine gum 1 pack day ,she quit 2015     She states she still has some SOB and PEOPLES and she has some cough  and Peoples    SHE HAD diaphragm paralysis left side    She can 1/2 block ,abpout 2 00 feet     She recently broke /twist November .towrn ligament s/p surgery   She use xopenex as needed and she needed 2-3 time week  She use nebulizer   She use dulera    She developed allergy   No chest paoin ,no Hemoptysis     Today visit  States she feels a lot better   Still with some cough and sob   Not able to get her sputum out  No Chest pain   Varicose procedure end of 2023 LE ,had the clot 2024   She has been doing better   Still with SOB and cough and wheezing   No fever  Still with leg swelling      ALLERGIES:    Allergies   Allergen Reactions    Latex Rash    Dupixent [Dupilumab]     Other Dermatitis     BANDAIDS, ADHESIVES     Oxycodone Hcl Other (See Comments)    Penicillins Swelling    Tetracycline Other (See Comments)    Adhesive Tape Rash    Morphine And Related Other (See Comments)     nausea and vomiting      Percocet [Oxycodone-Acetaminophen] Rash    Sulfa Antibiotics Rash       PAST MEDICAL HISTORY:       Diagnosis Date    Anesthesia complication     severe hypotension with block    Anxiety and depression     Arthritis     Asthma     Chronic bronchitis (HCC)     COPD (chronic obstructive pulmonary disease) (HCC)     Diabetes mellitus (HCC)     Fibromyalgia     Hx of blood clots     Hyperlipidemia     Hypertension     Migraine     Neuropathy     Pneumonia     Rash     Reflux     Sjogren's

## 2024-04-27 DIAGNOSIS — J45.909 UNCOMPLICATED ASTHMA, UNSPECIFIED ASTHMA SEVERITY, UNSPECIFIED WHETHER PERSISTENT: ICD-10-CM

## 2024-04-27 DIAGNOSIS — J44.9 CHRONIC OBSTRUCTIVE PULMONARY DISEASE, UNSPECIFIED COPD TYPE (HCC): ICD-10-CM

## 2024-04-29 NOTE — PROGRESS NOTES
[facility] [place]  Office Visit    Pauline Cordero  1980    May 1, 2024    CC:   Chief Complaint   Patient presents with    Follow-up    Hypertension    Hyperlipidemia     HPI:  The patient is 44 y.o. 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 She say Dr. Herrmann earlier in April 2024 d/t bilateral LE edema and echo was ordered which was denied by insurance. He also ordered Lexiscan-Myoview and cardiac event monitor. When insurance denied he recommended she see a NP in the office to discuss further. She was seen in the ED at [facility] on 4/30/24 for leg swelling and itching.  She was placed on antibiotic for cellulitis. Here for follow up.     Seen in ED last evening for continued issues with swelling in her legs.  Lasix increased and without change.  She continues to have issues with SOB with minimal exertion, prolonged talking and improves with sitting down and resting.  Continues to have occasional palpitations. Recent event monitor shows NSR, with short burst PSVT (5 beats), benign PVC's.  She continues to have issues with resting tachycardia. Continues with swelling in her legs (she has had veins removed in past).  Hospitalized in March 2024 for respiratory issues with hypoxia (had COVID + flu). Treated by pulmonary and had bronchoscopy. Continues to have episodes of short lived chest discomfort (described as a cramping). She was treated for PE at that time as well (no echo completed at that time to assess R heart involvement). CT suggested no R heart strain.  Has had some episodes of transient dizziness. No syncope. + edema persistent in her lower legs bilaterally.  No weight gain. Monitors sodium intake closely. Has known sleep apnea but not treated. Seen by [facility] yesterday and scan

## 2024-04-30 RX ORDER — AZITHROMYCIN 500 MG/1
500 TABLET, FILM COATED ORAL DAILY
Qty: 5 TABLET | Refills: 0 | OUTPATIENT
Start: 2024-04-30

## 2024-05-01 ENCOUNTER — OFFICE VISIT (OUTPATIENT)
Dept: CARDIOLOGY CLINIC | Age: 44
End: 2024-05-01
Payer: COMMERCIAL

## 2024-05-01 VITALS
OXYGEN SATURATION: 100 % | HEART RATE: 96 BPM | BODY MASS INDEX: 48.82 KG/M2 | DIASTOLIC BLOOD PRESSURE: 70 MMHG | WEIGHT: 293 LBS | SYSTOLIC BLOOD PRESSURE: 124 MMHG | HEIGHT: 65 IN

## 2024-05-01 DIAGNOSIS — R06.02 SHORTNESS OF BREATH: Primary | ICD-10-CM

## 2024-05-01 DIAGNOSIS — Z86.711 HX OF PULMONARY EMBOLUS: ICD-10-CM

## 2024-05-01 DIAGNOSIS — R00.2 PALPITATIONS: ICD-10-CM

## 2024-05-01 DIAGNOSIS — R00.0 SINUS TACHYCARDIA: ICD-10-CM

## 2024-05-01 DIAGNOSIS — Z86.718 H/O DEEP VENOUS THROMBOSIS: ICD-10-CM

## 2024-05-01 DIAGNOSIS — Z72.0 NICOTINE ABUSE: ICD-10-CM

## 2024-05-01 DIAGNOSIS — I10 ESSENTIAL (PRIMARY) HYPERTENSION: ICD-10-CM

## 2024-05-01 DIAGNOSIS — R60.0 BILATERAL EDEMA OF LOWER EXTREMITY: ICD-10-CM

## 2024-05-01 DIAGNOSIS — R01.1 HEART MURMUR: ICD-10-CM

## 2024-05-01 PROCEDURE — G8417 CALC BMI ABV UP PARAM F/U: HCPCS | Performed by: NURSE PRACTITIONER

## 2024-05-01 PROCEDURE — 3074F SYST BP LT 130 MM HG: CPT | Performed by: NURSE PRACTITIONER

## 2024-05-01 PROCEDURE — G8427 DOCREV CUR MEDS BY ELIG CLIN: HCPCS | Performed by: NURSE PRACTITIONER

## 2024-05-01 PROCEDURE — 4004F PT TOBACCO SCREEN RCVD TLK: CPT | Performed by: NURSE PRACTITIONER

## 2024-05-01 PROCEDURE — 3078F DIAST BP <80 MM HG: CPT | Performed by: NURSE PRACTITIONER

## 2024-05-01 PROCEDURE — 99214 OFFICE O/P EST MOD 30 MIN: CPT | Performed by: NURSE PRACTITIONER

## 2024-05-01 RX ORDER — CLINDAMYCIN HYDROCHLORIDE 300 MG/1
300 CAPSULE ORAL 3 TIMES DAILY
COMMUNITY
Start: 2024-05-01 | End: 2024-05-11

## 2024-05-01 NOTE — PATIENT INSTRUCTIONS
Call [phone] ([phone]) to schedule  -call and schedule echocardiogram    Continue same medications

## 2024-05-03 ENCOUNTER — TELEPHONE (OUTPATIENT)
Dept: CARDIOLOGY CLINIC | Age: 44
End: 2024-05-03

## 2024-05-03 DIAGNOSIS — R00.2 PALPITATION: ICD-10-CM

## 2024-05-03 DIAGNOSIS — R06.02 SOB (SHORTNESS OF BREATH): ICD-10-CM

## 2024-05-03 DIAGNOSIS — I10 HYPERTENSION, UNSPECIFIED TYPE: ICD-10-CM

## 2024-05-03 DIAGNOSIS — R60.0 BILATERAL EDEMA OF LOWER EXTREMITY: ICD-10-CM

## 2024-05-03 DIAGNOSIS — R53.83 OTHER FATIGUE: Primary | ICD-10-CM

## 2024-05-03 DIAGNOSIS — R01.1 HEART MURMUR: ICD-10-CM

## 2024-05-03 DIAGNOSIS — Z86.711 HX OF PULMONARY EMBOLUS: ICD-10-CM

## 2024-05-03 NOTE — TELEPHONE ENCOUNTER
The echocardiogram unfortunately had very limited evaluation of the heart strength.    The patient should undergo another echocardiogram with Definity contrast in the future.

## 2024-05-03 NOTE — TELEPHONE ENCOUNTER
PT called and stated she was admitted to [facility]. While there the doctor completed an ECHO. PT stated the [facility] Doctor wants [facility] to review results. PT stated [facility] is getting ready to discharge her.

## 2024-05-06 RX ORDER — AZITHROMYCIN 500 MG/1
500 TABLET, FILM COATED ORAL
Qty: 5 TABLET | Refills: 0 | Status: SHIPPED | OUTPATIENT
Start: 2024-05-06 | End: 2024-05-16

## 2024-05-06 NOTE — TELEPHONE ENCOUNTER
How long is pt to be on abx every other day?     Pt wants abx to be sent to [facility] now. She states [facility] has wrong Rx.       Pt to call about Tezspire and let us know.

## 2024-05-07 DIAGNOSIS — R00.0 SINUS TACHYCARDIA: ICD-10-CM

## 2024-05-07 DIAGNOSIS — R06.02 SHORTNESS OF BREATH: ICD-10-CM

## 2024-05-09 ENCOUNTER — TELEPHONE (OUTPATIENT)
Dept: CARDIOLOGY CLINIC | Age: 44
End: 2024-05-09

## 2024-05-09 NOTE — TELEPHONE ENCOUNTER
----- Message from Javon Herrmann MD sent at 5/9/2024  8:16 AM EDT -----  See scanned report to communicate findings/plans to patient. Thanks.

## 2024-05-10 ENCOUNTER — TELEPHONE (OUTPATIENT)
Dept: PULMONOLOGY | Age: 44
End: 2024-05-10

## 2024-05-10 RX ORDER — BUDESONIDE 0.5 MG/2ML
1 INHALANT ORAL 2 TIMES DAILY
Qty: 120 ML | Refills: 2 | Status: SHIPPED | OUTPATIENT
Start: 2024-05-10 | End: 2024-08-08

## 2024-05-10 NOTE — TELEPHONE ENCOUNTER
Submitted PA for Budesonide 0.5MG/2ML suspension   Via CMM Key: [id number]  STATUS: NOT SENT      We need a prescription placed to send in PA. Also will need DX code if this is not listed. This team cannot process a current PA without a prescription on the current med list.     If this requires a response please respond to the pool ( P MHCX PSC MEDICATION PRE-AUTH).       Thank you please advise patient.         Follow up done daily; if no decision with in three days we will refax.  If another three days goes by with no decision will call the insurance for status.

## 2024-05-24 DIAGNOSIS — J44.9 CHRONIC OBSTRUCTIVE PULMONARY DISEASE, UNSPECIFIED COPD TYPE (HCC): Primary | ICD-10-CM

## 2024-05-24 DIAGNOSIS — J45.909 UNCOMPLICATED ASTHMA, UNSPECIFIED ASTHMA SEVERITY, UNSPECIFIED WHETHER PERSISTENT: ICD-10-CM

## 2024-05-26 ENCOUNTER — OFFICE VISIT (OUTPATIENT)
Dept: URGENT CARE | Age: 44
End: 2024-05-26

## 2024-05-26 VITALS
HEIGHT: 65 IN | SYSTOLIC BLOOD PRESSURE: 108 MMHG | WEIGHT: 293 LBS | HEART RATE: 97 BPM | TEMPERATURE: 98.3 F | DIASTOLIC BLOOD PRESSURE: 75 MMHG | BODY MASS INDEX: 48.82 KG/M2 | OXYGEN SATURATION: 97 %

## 2024-05-26 DIAGNOSIS — R09.82 POSTNASAL DRIP: ICD-10-CM

## 2024-05-26 DIAGNOSIS — R09.89 RHONCHI: ICD-10-CM

## 2024-05-26 DIAGNOSIS — R06.02 SHORTNESS OF BREATH: ICD-10-CM

## 2024-05-26 DIAGNOSIS — R05.1 ACUTE COUGH: ICD-10-CM

## 2024-05-26 DIAGNOSIS — R06.2 WHEEZING: ICD-10-CM

## 2024-05-26 DIAGNOSIS — J44.1 COPD EXACERBATION (HCC): Primary | ICD-10-CM

## 2024-05-26 DIAGNOSIS — R09.81 NASAL CONGESTION: ICD-10-CM

## 2024-05-26 RX ORDER — DEXTROMETHORPHAN HBR AND PYRILAMINE MALEATE 30; 30 MG/1; MG/1
1 TABLET ORAL 4 TIMES DAILY PRN
Qty: 40 TABLET | Refills: 0 | Status: SHIPPED | OUTPATIENT
Start: 2024-05-26

## 2024-05-26 RX ORDER — LEVOFLOXACIN 750 MG/1
750 TABLET, FILM COATED ORAL DAILY
Qty: 5 TABLET | Refills: 0 | Status: SHIPPED | OUTPATIENT
Start: 2024-05-26 | End: 2024-05-31

## 2024-05-26 RX ORDER — ALBUTEROL SULFATE 90 UG/1
2 AEROSOL, METERED RESPIRATORY (INHALATION) 4 TIMES DAILY PRN
Qty: 18 G | Refills: 0 | Status: SHIPPED | OUTPATIENT
Start: 2024-05-26

## 2024-05-26 RX ORDER — GUAIFENESIN 600 MG/1
600 TABLET, EXTENDED RELEASE ORAL 2 TIMES DAILY
Qty: 20 TABLET | Refills: 0 | Status: SHIPPED | OUTPATIENT
Start: 2024-05-26 | End: 2024-06-05

## 2024-05-26 RX ORDER — SUCRALFATE ORAL 1 G/10ML
SUSPENSION ORAL
COMMUNITY
Start: 2024-05-24

## 2024-05-26 RX ORDER — PREDNISONE 10 MG/1
10 TABLET ORAL 2 TIMES DAILY
Qty: 10 TABLET | Refills: 0 | Status: SHIPPED | OUTPATIENT
Start: 2024-05-26 | End: 2024-05-31

## 2024-05-26 RX ORDER — IPRATROPIUM BROMIDE AND ALBUTEROL SULFATE 2.5; .5 MG/3ML; MG/3ML
1 SOLUTION RESPIRATORY (INHALATION) ONCE
Status: COMPLETED | OUTPATIENT
Start: 2024-05-26 | End: 2024-05-26

## 2024-05-26 RX ORDER — AZITHROMYCIN 250 MG/1
TABLET, FILM COATED ORAL
Qty: 6 TABLET | Refills: 0 | Status: CANCELLED | OUTPATIENT
Start: 2024-05-26 | End: 2024-06-05

## 2024-05-26 RX ORDER — FLUCONAZOLE 150 MG/1
150 TABLET ORAL
Qty: 2 TABLET | Refills: 0 | Status: SHIPPED | OUTPATIENT
Start: 2024-05-26 | End: 2024-06-01

## 2024-05-26 RX ADMIN — IPRATROPIUM BROMIDE AND ALBUTEROL SULFATE 1 DOSE: 2.5; .5 SOLUTION RESPIRATORY (INHALATION) at 15:53

## 2024-05-30 RX ORDER — POTASSIUM CHLORIDE 750 MG/1
10 TABLET, EXTENDED RELEASE ORAL 2 TIMES DAILY
Qty: 60 TABLET | Refills: 0 | Status: SHIPPED | OUTPATIENT
Start: 2024-05-30

## 2024-05-30 RX ORDER — MONTELUKAST SODIUM 10 MG/1
10 TABLET ORAL EVERY MORNING
Qty: 30 TABLET | Refills: 2 | Status: SHIPPED | OUTPATIENT
Start: 2024-05-30

## 2024-05-31 ENCOUNTER — TELEPHONE (OUTPATIENT)
Dept: PULMONOLOGY | Age: 44
End: 2024-05-31

## 2024-05-31 NOTE — TELEPHONE ENCOUNTER
Saurav is replacing Mickey with Tezspire. Pt needs paperwork sent in for the syringe due to her insurance.

## 2024-06-05 ENCOUNTER — HOSPITAL ENCOUNTER (OUTPATIENT)
Dept: NUCLEAR MEDICINE | Age: 44
Discharge: HOME | End: 2024-06-05
Payer: COMMERCIAL

## 2024-06-05 DIAGNOSIS — K21.9 ACID REFLUX DISEASE WITH ULCER: ICD-10-CM

## 2024-06-05 PROCEDURE — A9541 TC99M SULFUR COLLOID: HCPCS

## 2024-06-05 PROCEDURE — 78264 GASTRIC EMPTYING IMG STUDY: CPT

## 2024-06-05 PROCEDURE — 3430000000 HC RX DIAGNOSTIC RADIOPHARMACEUTICAL

## 2024-06-05 RX ADMIN — TECHNETIUM TC 99M SULFUR COLLOID 0.73 MILLICURIE: KIT at 10:37

## 2024-07-02 ENCOUNTER — OFFICE VISIT (OUTPATIENT)
Dept: CARDIOTHORACIC SURGERY | Age: 44
End: 2024-07-02
Payer: COMMERCIAL

## 2024-07-02 VITALS
SYSTOLIC BLOOD PRESSURE: 112 MMHG | BODY MASS INDEX: 48.82 KG/M2 | HEART RATE: 105 BPM | HEIGHT: 65 IN | WEIGHT: 293 LBS | DIASTOLIC BLOOD PRESSURE: 69 MMHG

## 2024-07-02 DIAGNOSIS — J98.6 DIAPHRAGM PARALYSIS: Primary | ICD-10-CM

## 2024-07-02 PROCEDURE — 99204 OFFICE O/P NEW MOD 45 MIN: CPT | Performed by: THORACIC SURGERY (CARDIOTHORACIC VASCULAR SURGERY)

## 2024-07-02 PROCEDURE — 3074F SYST BP LT 130 MM HG: CPT | Performed by: THORACIC SURGERY (CARDIOTHORACIC VASCULAR SURGERY)

## 2024-07-02 PROCEDURE — 3078F DIAST BP <80 MM HG: CPT | Performed by: THORACIC SURGERY (CARDIOTHORACIC VASCULAR SURGERY)
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
## 2024-07-17 ENCOUNTER — PATIENT MESSAGE (OUTPATIENT)
Dept: PULMONOLOGY | Age: 44
End: 2024-07-17

## 2024-07-17 ENCOUNTER — OFFICE VISIT (OUTPATIENT)
Dept: ENT CLINIC | Age: 44
End: 2024-07-17
Payer: COMMERCIAL

## 2024-07-17 ENCOUNTER — PROCEDURE VISIT (OUTPATIENT)
Dept: AUDIOLOGY | Age: 44
End: 2024-07-17
Payer: COMMERCIAL

## 2024-07-17 VITALS
WEIGHT: 293 LBS | HEART RATE: 96 BPM | DIASTOLIC BLOOD PRESSURE: 83 MMHG | BODY MASS INDEX: 48.82 KG/M2 | SYSTOLIC BLOOD PRESSURE: 129 MMHG | HEIGHT: 65 IN

## 2024-07-17 DIAGNOSIS — R51.9 OCCIPITAL HEADACHE: Primary | ICD-10-CM

## 2024-07-17 DIAGNOSIS — H93.293 ABNORMAL AUDITORY PERCEPTION OF BOTH EARS: ICD-10-CM

## 2024-07-17 DIAGNOSIS — J45.50 UNCOMPLICATED SEVERE PERSISTENT ASTHMA: ICD-10-CM

## 2024-07-17 DIAGNOSIS — J45.909 UNCOMPLICATED ASTHMA, UNSPECIFIED ASTHMA SEVERITY, UNSPECIFIED WHETHER PERSISTENT: ICD-10-CM

## 2024-07-17 DIAGNOSIS — R42 DIZZINESS AND GIDDINESS: ICD-10-CM

## 2024-07-17 DIAGNOSIS — K21.9 GASTROESOPHAGEAL REFLUX DISEASE, UNSPECIFIED WHETHER ESOPHAGITIS PRESENT: ICD-10-CM

## 2024-07-17 DIAGNOSIS — R49.0 DYSPHONIA: ICD-10-CM

## 2024-07-17 DIAGNOSIS — H90.41 SENSORINEURAL HEARING LOSS (SNHL) OF RIGHT EAR WITH UNRESTRICTED HEARING OF LEFT EAR: Primary | ICD-10-CM

## 2024-07-17 DIAGNOSIS — M54.2 NECK PAIN: ICD-10-CM

## 2024-07-17 DIAGNOSIS — J44.9 CHRONIC OBSTRUCTIVE PULMONARY DISEASE, UNSPECIFIED COPD TYPE (HCC): ICD-10-CM

## 2024-07-17 PROCEDURE — 3079F DIAST BP 80-89 MM HG: CPT | Performed by: OTOLARYNGOLOGY

## 2024-07-17 PROCEDURE — G8417 CALC BMI ABV UP PARAM F/U: HCPCS | Performed by: OTOLARYNGOLOGY

## 2024-07-17 PROCEDURE — G8427 DOCREV CUR MEDS BY ELIG CLIN: HCPCS | Performed by: OTOLARYNGOLOGY

## 2024-07-17 PROCEDURE — 3074F SYST BP LT 130 MM HG: CPT | Performed by: OTOLARYNGOLOGY

## 2024-07-17 PROCEDURE — 99204 OFFICE O/P NEW MOD 45 MIN: CPT | Performed by: OTOLARYNGOLOGY

## 2024-07-17 PROCEDURE — 92567 TYMPANOMETRY: CPT | Performed by: AUDIOLOGIST

## 2024-07-17 PROCEDURE — 92557 COMPREHENSIVE HEARING TEST: CPT | Performed by: AUDIOLOGIST

## 2024-07-17 PROCEDURE — 31575 DIAGNOSTIC LARYNGOSCOPY: CPT | Performed by: OTOLARYNGOLOGY

## 2024-07-17 PROCEDURE — 4004F PT TOBACCO SCREEN RCVD TLK: CPT | Performed by: OTOLARYNGOLOGY

## 2024-07-17 ASSESSMENT — ENCOUNTER SYMPTOMS
TROUBLE SWALLOWING: 0
COUGH: 0
SHORTNESS OF BREATH: 0
SORE THROAT: 0
APNEA: 0
FACIAL SWELLING: 0
EYE ITCHING: 0
SINUS PRESSURE: 0
VOICE CHANGE: 1

## 2024-07-17 NOTE — PROGRESS NOTES
[facility]  [address], [address]  [place] [address]  P: [phone]       Patient     Pauline Cordero  1980    ChiefComplaint     Chief Complaint   Patient presents with    Hearing Problem     patient has noticed a decrease in hearing    Ear Problem     Bilateral ear pain worsening on the right.    Pharyngitis     chronic hoarseness - patient had vocal cord stripping 10 yrs ago with Dr. Romano. Constant throat clearing with neck pain & head ache.         History of Present Illness     Pauline is a 44-year-old female here today for evaluation of bilateral base of skull pain and swelling as well as right ear pain and swelling of neck inferior to the ear.  Has perception of muffled hearing and difficulty understanding.  Sleeps in a recliner that she states causes significant discomfort upon waking if she does not sleep in a certain manner severe pain.  Has pain that radiates from base of skull anteriorly along scalp.  Also has intermittent numbness and tingling to various portions of the face.  History of acid reflux had Nissen fundoplication but states for the last year increasing acid reflux, follows with GI known intestinal metaplasia has yearly EGDs.    Past Medical History     Past Medical History:   Diagnosis Date    Anesthesia complication     severe hypotension with block    Anxiety and depression     Arthritis     Asthma     Chronic bronchitis (HCC)     COPD (chronic obstructive pulmonary disease) (HCC)     Diabetes mellitus (HCC)     Fibromyalgia     Hx of blood clots     PE    Hyperlipidemia     Hypertension     Migraine     Neuropathy     Pneumonia     Rash     Reflux     Sjogren's disease (HCC)     Sleep apnea     Thyroid disease     Wears glasses        Past Surgical History     Past Surgical History:   Procedure Laterality Date    ABDOMEN SURGERY      BRONCHOSCOPY  16     SECTION      x 2    COLONOSCOPY      normal per pt    CYST REMOVAL      DENTAL SURGERY

## 2024-07-17 NOTE — PROGRESS NOTES
speech stimuli.    Tympanometry: Normal peak pressure and compliance, Type A tympanogram, consistent with normal middle ear function.      LEFT EAR:  Hearing Sensitivity:Hearing sensitivity within normal limits from 250-8000 Hz  Speech Recognition Threshold: 20 dB HL  Word Recognition: Excellent (100%), based on NU-6 Ordered-by-Difficulty 10-word list at 50 dBHL using recorded speech stimuli.    Tympanometry: Normal peak pressure and compliance, Type A tympanogram, consistent with normal middle ear function.      Reliability: Good   Transducer: Inserts      See scanned audiogram dated 7/17/2024  for results.        PATIENT EDUCATION:       The following items were discussed with the patient:   - Good Communication Strategies    Educational information was shared in the After Visit Summary.                                                RECOMMENDATIONS:                                                                                                                                                                                                                                                            The following items are recommended based on patient report and results from today's appointment:   - Continue medical follow-up with Sylwia Oconnor DO.   - Retest hearing as medically indicated and/or sooner if a change in hearing is noted.  - Utilize \"Good Communication Strategies\" as discussed to assist in speech understanding with communication partners.       Marlene Rosenthal  Audiologist    Chart CC'd to: Sylwia Oconnor DO      Degree of   Hearing Sensitivity dB Range   Within Normal Limits (WNL) 0 - 20   Mild 20 - 40   Moderate 40 - 55   Moderately-Severe 55 - 70   Severe 70 - 90   Profound 90 +

## 2024-07-18 NOTE — TELEPHONE ENCOUNTER
From: Pauline Cordero  To: Dr. Nakia Burgos  Sent: 7/17/2024 5:56 PM EDT  Subject: Medicines     Please send all my inhaler refills and medicines including the potassium to [facility] in [place] on [address] [facility] [facility]. thank you! They don’t have all the scripts. Thank you

## 2024-07-19 RX ORDER — FUROSEMIDE 20 MG/1
20 TABLET ORAL DAILY
Qty: 30 TABLET | Refills: 0 | Status: SHIPPED | OUTPATIENT
Start: 2024-07-19

## 2024-07-19 RX ORDER — TIOTROPIUM BROMIDE INHALATION SPRAY 1.56 UG/1
2 SPRAY, METERED RESPIRATORY (INHALATION) DAILY
Qty: 1 EACH | Refills: 2 | Status: SHIPPED | OUTPATIENT
Start: 2024-07-19

## 2024-07-19 RX ORDER — BUDESONIDE 0.5 MG/2ML
1 INHALANT ORAL 2 TIMES DAILY
Qty: 120 ML | Refills: 2 | Status: SHIPPED | OUTPATIENT
Start: 2024-07-19 | End: 2024-10-17

## 2024-07-19 RX ORDER — MONTELUKAST SODIUM 10 MG/1
10 TABLET ORAL EVERY MORNING
Qty: 30 TABLET | Refills: 2 | Status: SHIPPED | OUTPATIENT
Start: 2024-07-19

## 2024-07-19 RX ORDER — CETIRIZINE HYDROCHLORIDE 10 MG/1
10 TABLET ORAL DAILY
Qty: 30 TABLET | Refills: 5 | Status: SHIPPED | OUTPATIENT
Start: 2024-07-19

## 2024-07-25 ENCOUNTER — TELEMEDICINE (OUTPATIENT)
Dept: PULMONOLOGY | Age: 44
End: 2024-07-25
Payer: COMMERCIAL

## 2024-07-25 DIAGNOSIS — J45.909 UNCOMPLICATED ASTHMA, UNSPECIFIED ASTHMA SEVERITY, UNSPECIFIED WHETHER PERSISTENT: ICD-10-CM

## 2024-07-25 DIAGNOSIS — R06.2 WHEEZING: ICD-10-CM

## 2024-07-25 DIAGNOSIS — J44.1 COPD EXACERBATION (HCC): ICD-10-CM

## 2024-07-25 DIAGNOSIS — J44.9 CHRONIC OBSTRUCTIVE PULMONARY DISEASE, UNSPECIFIED COPD TYPE (HCC): ICD-10-CM

## 2024-07-25 PROCEDURE — 99214 OFFICE O/P EST MOD 30 MIN: CPT | Performed by: INTERNAL MEDICINE

## 2024-07-25 PROCEDURE — G8427 DOCREV CUR MEDS BY ELIG CLIN: HCPCS | Performed by: INTERNAL MEDICINE

## 2024-07-25 PROCEDURE — 4004F PT TOBACCO SCREEN RCVD TLK: CPT | Performed by: INTERNAL MEDICINE

## 2024-07-25 PROCEDURE — 3023F SPIROM DOC REV: CPT | Performed by: INTERNAL MEDICINE

## 2024-07-25 PROCEDURE — G8417 CALC BMI ABV UP PARAM F/U: HCPCS | Performed by: INTERNAL MEDICINE

## 2024-07-25 RX ORDER — POTASSIUM CHLORIDE 750 MG/1
TABLET, FILM COATED, EXTENDED RELEASE ORAL
COMMUNITY
Start: 2024-07-18

## 2024-07-25 RX ORDER — BUDESONIDE AND FORMOTEROL FUMARATE DIHYDRATE 160; 4.5 UG/1; UG/1
2 AEROSOL RESPIRATORY (INHALATION) 2 TIMES DAILY
Qty: 30.6 G | Refills: 1 | Status: SHIPPED | OUTPATIENT
Start: 2024-07-25

## 2024-07-25 RX ORDER — DEXAMETHASONE 1 MG
6 TABLET ORAL 2 TIMES DAILY WITH MEALS
COMMUNITY

## 2024-07-25 RX ORDER — ESOMEPRAZOLE MAGNESIUM 40 MG/1
CAPSULE, DELAYED RELEASE ORAL
COMMUNITY
Start: 2024-07-17

## 2024-07-25 RX ORDER — LEVOTHYROXINE SODIUM 0.05 MG/1
TABLET ORAL
COMMUNITY
Start: 2024-07-18

## 2024-07-25 RX ORDER — BUDESONIDE 0.5 MG/2ML
1 INHALANT ORAL 2 TIMES DAILY
Qty: 120 ML | Refills: 2 | Status: SHIPPED | OUTPATIENT
Start: 2024-07-25 | End: 2024-10-23

## 2024-07-25 RX ORDER — TIOTROPIUM BROMIDE INHALATION SPRAY 1.56 UG/1
2 SPRAY, METERED RESPIRATORY (INHALATION) DAILY
Qty: 1 EACH | Refills: 2 | Status: SHIPPED | OUTPATIENT
Start: 2024-07-25

## 2024-07-25 RX ORDER — PANTOPRAZOLE SODIUM 40 MG/1
TABLET, DELAYED RELEASE ORAL
COMMUNITY
Start: 2024-07-17

## 2024-07-25 RX ORDER — METOCLOPRAMIDE HYDROCHLORIDE 5 MG/5ML
SOLUTION ORAL
COMMUNITY
Start: 2024-07-19

## 2024-07-25 RX ORDER — MONTELUKAST SODIUM 10 MG/1
10 TABLET ORAL EVERY MORNING
Qty: 30 TABLET | Refills: 2 | Status: SHIPPED | OUTPATIENT
Start: 2024-07-25

## 2024-07-25 RX ORDER — ALBUTEROL SULFATE 90 UG/1
2 AEROSOL, METERED RESPIRATORY (INHALATION) 4 TIMES DAILY PRN
Qty: 18 G | Refills: 0 | Status: SHIPPED | OUTPATIENT
Start: 2024-07-25

## 2024-07-25 RX ORDER — TEZEPELUMAB-EKKO 210 MG/1.9ML
INJECTION, SOLUTION SUBCUTANEOUS
COMMUNITY
Start: 2024-05-29

## 2024-07-25 NOTE — PROGRESS NOTES
nicorette gum     TB :  Pets cats   Industrial exposure:,live in grand father ,has mold in house   Birds :    SURGICAL HISTORY:   Past Surgical History:   Procedure Laterality Date    ABDOMEN SURGERY      BRONCHOSCOPY  16     SECTION      x 2    COLONOSCOPY  2013    normal per pt    CYST REMOVAL      DENTAL SURGERY      teeth removed    ENDOSCOPY, COLON, DIAGNOSTIC      ESOPHAGEAL DILATATION      GALLBLADDER SURGERY      GASTRIC FUNDOPLICATION  3/2016    HYSTEROSCOPY  09/10/2015    Hysteroscopy, dilation and curettage with the MyoSure, NovaSure    OTHER SURGICAL HISTORY  9/10/15    HYSTEROSCOPY, DILATATION AND CURETTAGE WITH MYOSURE, NOVASURE    SHOULDER SURGERY      right    THROAT SURGERY Right 2015    vocal cord stripping    TUBAL LIGATION      UPPER GASTROINTESTINAL ENDOSCOPY  13    gastritis, hiatal hernia, hemorrhoids    UPPER GASTROINTESTINAL ENDOSCOPY      10/2015    UPPER GASTROINTESTINAL ENDOSCOPY  2016    URETHRA SURGERY         FAMILY HISTORY:   Lung cancer:no   DVT or PE     REVIEW OF SYSTEMS:  Constitutional: General health is good . There has been no weight changes. No fevers, fatigue or weakness.   Head: Patient denies any history of trauma, convulsive disorder or syncope.    Skin:  Patient denies history of changes in pigmentation, eruptions or pruritus.  No easy bruising or bleeding.  EENT: no nasal congestion   Cardiovascular ,No chest pain ,No edema ,  Respiration:SOB: + ,LEYVA :+  Gastrointestinal:No GI bleed ,no melena  ,no hematemesis    Musculoskeletal: no joint pain ,no swelling  Neurological:no , syncope.  Denies twitching, convulsions, loss of consciousness or memory.     Endocrine:  . No history of goiter, exophthalmos or dryness of skin.  The patient has no history of diabetes.    Hematopoietic:  No history of bleeding disorders or easy bruising.  Rheumatic:  No connective tissue disease history or polyarthritis/inflammatory joint disease.      PHYSICAL

## 2024-07-31 ENCOUNTER — HOSPITAL ENCOUNTER (OUTPATIENT)
Dept: GENERAL RADIOLOGY | Age: 44
Discharge: HOME | End: 2024-07-31
Attending: INTERNAL MEDICINE
Payer: COMMERCIAL

## 2024-07-31 DIAGNOSIS — K21.9 GASTROESOPHAGEAL REFLUX DISEASE WITHOUT ESOPHAGITIS: ICD-10-CM

## 2024-07-31 PROCEDURE — 74220 X-RAY XM ESOPHAGUS 1CNTRST: CPT

## 2024-08-23 ENCOUNTER — OFFICE VISIT (OUTPATIENT)
Dept: URGENT CARE | Age: 44
End: 2024-08-23

## 2024-08-23 VITALS
WEIGHT: 293 LBS | OXYGEN SATURATION: 97 % | SYSTOLIC BLOOD PRESSURE: 121 MMHG | HEIGHT: 65 IN | TEMPERATURE: 97.9 F | BODY MASS INDEX: 48.82 KG/M2 | DIASTOLIC BLOOD PRESSURE: 70 MMHG | HEART RATE: 107 BPM

## 2024-08-23 DIAGNOSIS — N30.01 ACUTE CYSTITIS WITH HEMATURIA: Primary | ICD-10-CM

## 2024-08-23 LAB
BILIRUBIN, POC: NEGATIVE
BLOOD URINE, POC: ABNORMAL
CLARITY, POC: ABNORMAL
COLOR, POC: ABNORMAL
GLUCOSE URINE, POC: NEGATIVE
KETONES, POC: ABNORMAL
LEUKOCYTE EST, POC: ABNORMAL
NITRITE, POC: POSITIVE
PH, POC: 6
PROTEIN, POC: ABNORMAL
SPECIFIC GRAVITY, POC: >=1.03
UROBILINOGEN, POC: ABNORMAL

## 2024-08-23 RX ORDER — PHENAZOPYRIDINE HYDROCHLORIDE 100 MG/1
100 TABLET, FILM COATED ORAL 3 TIMES DAILY PRN
Qty: 6 TABLET | Refills: 0 | Status: SHIPPED | OUTPATIENT
Start: 2024-08-23 | End: 2024-08-26

## 2024-08-23 RX ORDER — NITROFURANTOIN 25; 75 MG/1; MG/1
100 CAPSULE ORAL 2 TIMES DAILY
Qty: 14 CAPSULE | Refills: 0 | Status: SHIPPED | OUTPATIENT
Start: 2024-08-23 | End: 2024-08-30

## 2024-08-23 ASSESSMENT — ENCOUNTER SYMPTOMS
RESPIRATORY NEGATIVE: 1
GASTROINTESTINAL NEGATIVE: 1

## 2024-08-23 NOTE — PATIENT INSTRUCTIONS
Your urine dip today indicates an infection.  We will send a specimen to the lab to be cultured.  We will call you with the results in about 3 days     Remember to stay hydrated and empty your bladder when you have to urinate.       Holding urine for too long increases your risk for urinary tract infections     Normal female anatomy makes it easy to introduce bacteria into the urinary tract.     Remember to wipe from front to back and avoid taking bubble baths.     Follow up with your PCP if your symptoms do not improve in the next 3-5 days.

## 2024-08-23 NOTE — PROGRESS NOTES
Pauline Cordero (: 1980) is a 44 y.o. female, Established patient, here for evaluation of the following chief complaint(s):  Hematuria (Patient states she has been urinating blood for the past couple of days, minor pain with urination. Last week had pressure, but has since resolved. Lower back pain off/on. )      ASSESSMENT/PLAN:    ICD-10-CM    1. Acute cystitis with hematuria  N30.01 Culture, Urine     POCT Urinalysis no Micro     nitrofurantoin, macrocrystal-monohydrate, (MACROBID) 100 MG capsule     phenazopyridine (PYRIDIUM) 100 MG tablet            Discussed PCP follow up for persisting or worsening symptoms, or to return to the clinic if unable to obtain PCP follow up for worsening symptoms.    The patient tolerated their visit well. The patient and/or the family were informed of the results of any tests, a time was given to answer questions, a plan was proposed and they agreed with plan. Reviewed AVS with treatment instructions and answered questions - patient/family expresses understanding and agreement with the discussed treatment plan and AVS instructions.       Patient exam as below. Patient was ambulatory, nontoxic, and medically stable.    History obtained from patient.     The patient's external records have been reviewed.     I have reviewed the patient's labs from this encounter with results as noted below.     Differential diagnosis considered. Overall presentation is consistent with acute cystitis with hematuria.     Pauline Cordero will be treated outpatient with macrobid, pyridium.     Disposition: Discharged with instructions to obtain outpatient follow up via their primary care provider in the next 3 to 5 days, with strict return precautions if patient develops new or worsening symptoms.  I have encouraged the patient to present directly to the emergency department with any especially concerning symptoms such as flank pain, worsening abdominal pain,

## 2024-08-25 LAB
BACTERIA UR CULT: ABNORMAL
ORGANISM: ABNORMAL

## 2024-08-30 ENCOUNTER — TELEPHONE (OUTPATIENT)
Dept: PULMONOLOGY | Age: 44
End: 2024-08-30

## 2024-08-30 NOTE — TELEPHONE ENCOUNTER
Patient went to Urgent Care in [place] yesterday because she is sick again.They did a chest xray and she has pneumonia. This is affecting her breathing. They gave her Doxycycline because of that and also because she has an infection in her leg. She wants to know if this is the best course of action since she has so many breathing problems and problems with her lungs.   She would like us to get the chest xray from Urgent Care so Dr. Burgos can look at it and let her know what to do.   She also has a f/u appt on 9/4.

## 2024-08-30 NOTE — TELEPHONE ENCOUNTER
Pt is aware.  Pt is requesting xray disk for imaging   Called [place] urgent care they are faxing record   Pt is aware to go ed if she is not feeling better or is feeling worse.

## 2024-09-01 ENCOUNTER — APPOINTMENT (OUTPATIENT)
Dept: CT IMAGING | Age: 44
DRG: 720 | End: 2024-09-01
Payer: COMMERCIAL

## 2024-09-01 ENCOUNTER — HOSPITAL ENCOUNTER (INPATIENT)
Age: 44
LOS: 2 days | Discharge: HOME | DRG: 720 | End: 2024-09-03
Attending: STUDENT IN AN ORGANIZED HEALTH CARE EDUCATION/TRAINING PROGRAM | Admitting: INTERNAL MEDICINE
Payer: COMMERCIAL

## 2024-09-01 ENCOUNTER — APPOINTMENT (OUTPATIENT)
Dept: GENERAL RADIOLOGY | Age: 44
DRG: 720 | End: 2024-09-01
Payer: COMMERCIAL

## 2024-09-01 DIAGNOSIS — E11.9 TYPE 2 DIABETES MELLITUS WITHOUT COMPLICATION, WITHOUT LONG-TERM CURRENT USE OF INSULIN (HCC): ICD-10-CM

## 2024-09-01 DIAGNOSIS — J18.9 PNEUMONIA OF RIGHT LOWER LOBE DUE TO INFECTIOUS ORGANISM: ICD-10-CM

## 2024-09-01 DIAGNOSIS — J44.0 CHRONIC OBSTRUCTIVE PULMONARY DISEASE WITH ACUTE LOWER RESPIRATORY INFECTION (HCC): ICD-10-CM

## 2024-09-01 DIAGNOSIS — R09.02 HYPOXEMIA: Primary | ICD-10-CM

## 2024-09-01 DIAGNOSIS — J44.9 CHRONIC OBSTRUCTIVE PULMONARY DISEASE, UNSPECIFIED COPD TYPE (HCC): ICD-10-CM

## 2024-09-01 PROBLEM — J15.9 PNEUMONIA DUE TO GRAM-POSITIVE BACTERIA: Status: ACTIVE | Noted: 2024-09-01

## 2024-09-01 LAB
ALBUMIN SERPL-MCNC: 3.4 G/DL (ref 3.4–5)
ALBUMIN/GLOB SERPL: 1 {RATIO} (ref 1.1–2.2)
ALP SERPL-CCNC: 122 U/L (ref 40–129)
ALT SERPL-CCNC: 31 U/L (ref 10–40)
ANION GAP SERPL CALCULATED.3IONS-SCNC: 16 MMOL/L (ref 3–16)
AST SERPL-CCNC: 35 U/L (ref 15–37)
BASE EXCESS BLDV CALC-SCNC: -3.1 MMOL/L (ref -3–3)
BASOPHILS # BLD: 0.1 K/UL (ref 0–0.2)
BASOPHILS NFR BLD: 0.8 %
BILIRUB SERPL-MCNC: <0.2 MG/DL (ref 0–1)
BILIRUB UR QL STRIP.AUTO: NEGATIVE
BUN SERPL-MCNC: 16 MG/DL (ref 7–20)
CALCIUM SERPL-MCNC: 8.8 MG/DL (ref 8.3–10.6)
CHLORIDE SERPL-SCNC: 98 MMOL/L (ref 99–110)
CLARITY UR: CLEAR
CO2 BLDV-SCNC: 20 MMOL/L
CO2 SERPL-SCNC: 20 MMOL/L (ref 21–32)
COHGB MFR BLDV: 2 % (ref 0–1.5)
COLOR UR: YELLOW
CREAT SERPL-MCNC: 0.7 MG/DL (ref 0.6–1.1)
DEPRECATED RDW RBC AUTO: 17 % (ref 12.4–15.4)
EOSINOPHIL # BLD: 0.8 K/UL (ref 0–0.6)
EOSINOPHIL NFR BLD: 5.7 %
FLUAV RNA RESP QL NAA+PROBE: NOT DETECTED
FLUBV RNA RESP QL NAA+PROBE: NOT DETECTED
GFR SERPLBLD CREATININE-BSD FMLA CKD-EPI: >90 ML/MIN/{1.73_M2}
GLUCOSE BLD-MCNC: 478 MG/DL (ref 70–99)
GLUCOSE BLD-MCNC: 497 MG/DL (ref 70–99)
GLUCOSE BLD-MCNC: 534 MG/DL (ref 70–99)
GLUCOSE SERPL-MCNC: 468 MG/DL (ref 70–99)
GLUCOSE UR STRIP.AUTO-MCNC: >=1000 MG/DL
HCG SERPL QL: NEGATIVE
HCO3 BLDV-SCNC: 19.5 MMOL/L (ref 23–29)
HCT VFR BLD AUTO: 39.1 % (ref 36–48)
HGB BLD-MCNC: 12.4 G/DL (ref 12–16)
HGB UR QL STRIP.AUTO: NEGATIVE
KETONES UR STRIP.AUTO-MCNC: NEGATIVE MG/DL
LACTATE BLDV-SCNC: 2.5 MMOL/L (ref 0.4–2)
LACTATE BLDV-SCNC: 3.3 MMOL/L (ref 0.4–2)
LACTATE BLDV-SCNC: 3.9 MMOL/L (ref 0.4–2)
LEUKOCYTE ESTERASE UR QL STRIP.AUTO: NEGATIVE
LYMPHOCYTES # BLD: 2.2 K/UL (ref 1–5.1)
LYMPHOCYTES NFR BLD: 16.4 %
MCH RBC QN AUTO: 24.7 PG (ref 26–34)
MCHC RBC AUTO-ENTMCNC: 31.7 G/DL (ref 31–36)
MCV RBC AUTO: 77.9 FL (ref 80–100)
METHGB MFR BLDV: 0.1 %
MONOCYTES # BLD: 1 K/UL (ref 0–1.3)
MONOCYTES NFR BLD: 7.1 %
NEUTROPHILS # BLD: 9.5 K/UL (ref 1.7–7.7)
NEUTROPHILS NFR BLD: 70 %
NITRITE UR QL STRIP.AUTO: NEGATIVE
NT-PROBNP SERPL-MCNC: <36 PG/ML (ref 0–124)
O2 CT VFR BLDV CALC: 19 VOL %
O2 THERAPY: ABNORMAL
PCO2 BLDV: 28.3 MMHG (ref 40–50)
PERFORMED ON: ABNORMAL
PH BLDV: 7.46 [PH] (ref 7.35–7.45)
PH UR STRIP.AUTO: 6 [PH] (ref 5–8)
PLATELET # BLD AUTO: 287 K/UL (ref 135–450)
PMV BLD AUTO: 8.8 FL (ref 5–10.5)
PO2 BLDV: 138.3 MMHG (ref 25–40)
POTASSIUM SERPL-SCNC: 4.1 MMOL/L (ref 3.5–5.1)
PROCALCITONIN SERPL IA-MCNC: 0.08 NG/ML (ref 0–0.15)
PROT SERPL-MCNC: 6.8 G/DL (ref 6.4–8.2)
PROT UR STRIP.AUTO-MCNC: NEGATIVE MG/DL
RBC # BLD AUTO: 5.02 M/UL (ref 4–5.2)
SAO2 % BLDV: 99 %
SARS-COV-2 RNA RESP QL NAA+PROBE: NOT DETECTED
SODIUM SERPL-SCNC: 134 MMOL/L (ref 136–145)
SP GR UR STRIP.AUTO: 1.01 (ref 1–1.03)
TROPONIN, HIGH SENSITIVITY: 8 NG/L (ref 0–14)
TROPONIN, HIGH SENSITIVITY: 8 NG/L (ref 0–14)
UA COMPLETE W REFLEX CULTURE PNL UR: ABNORMAL
UA DIPSTICK W REFLEX MICRO PNL UR: ABNORMAL
URN SPEC COLLECT METH UR: ABNORMAL
UROBILINOGEN UR STRIP-ACNC: 0.2 E.U./DL
WBC # BLD AUTO: 13.5 K/UL (ref 4–11)

## 2024-09-01 PROCEDURE — 87636 SARSCOV2 & INF A&B AMP PRB: CPT

## 2024-09-01 PROCEDURE — 2580000003 HC RX 258

## 2024-09-01 PROCEDURE — 71260 CT THORAX DX C+: CPT

## 2024-09-01 PROCEDURE — 83880 ASSAY OF NATRIURETIC PEPTIDE: CPT

## 2024-09-01 PROCEDURE — 82803 BLOOD GASES ANY COMBINATION: CPT

## 2024-09-01 PROCEDURE — 6370000000 HC RX 637 (ALT 250 FOR IP)

## 2024-09-01 PROCEDURE — 84145 PROCALCITONIN (PCT): CPT

## 2024-09-01 PROCEDURE — 6370000000 HC RX 637 (ALT 250 FOR IP): Performed by: INTERNAL MEDICINE

## 2024-09-01 PROCEDURE — 84484 ASSAY OF TROPONIN QUANT: CPT

## 2024-09-01 PROCEDURE — 2700000000 HC OXYGEN THERAPY PER DAY

## 2024-09-01 PROCEDURE — 80053 COMPREHEN METABOLIC PANEL: CPT

## 2024-09-01 PROCEDURE — 84703 CHORIONIC GONADOTROPIN ASSAY: CPT

## 2024-09-01 PROCEDURE — 99223 1ST HOSP IP/OBS HIGH 75: CPT

## 2024-09-01 PROCEDURE — 83605 ASSAY OF LACTIC ACID: CPT

## 2024-09-01 PROCEDURE — 94640 AIRWAY INHALATION TREATMENT: CPT

## 2024-09-01 PROCEDURE — 36415 COLL VENOUS BLD VENIPUNCTURE: CPT

## 2024-09-01 PROCEDURE — 6360000002 HC RX W HCPCS

## 2024-09-01 PROCEDURE — 1200000000 HC SEMI PRIVATE

## 2024-09-01 PROCEDURE — 71045 X-RAY EXAM CHEST 1 VIEW: CPT

## 2024-09-01 PROCEDURE — 6370000000 HC RX 637 (ALT 250 FOR IP): Performed by: STUDENT IN AN ORGANIZED HEALTH CARE EDUCATION/TRAINING PROGRAM

## 2024-09-01 PROCEDURE — 2580000003 HC RX 258: Performed by: STUDENT IN AN ORGANIZED HEALTH CARE EDUCATION/TRAINING PROGRAM

## 2024-09-01 PROCEDURE — 81003 URINALYSIS AUTO W/O SCOPE: CPT

## 2024-09-01 PROCEDURE — 99285 EMERGENCY DEPT VISIT HI MDM: CPT

## 2024-09-01 PROCEDURE — 96375 TX/PRO/DX INJ NEW DRUG ADDON: CPT

## 2024-09-01 PROCEDURE — 83036 HEMOGLOBIN GLYCOSYLATED A1C: CPT

## 2024-09-01 PROCEDURE — 94761 N-INVAS EAR/PLS OXIMETRY MLT: CPT

## 2024-09-01 PROCEDURE — 6360000002 HC RX W HCPCS: Performed by: STUDENT IN AN ORGANIZED HEALTH CARE EDUCATION/TRAINING PROGRAM

## 2024-09-01 PROCEDURE — 96365 THER/PROPH/DIAG IV INF INIT: CPT

## 2024-09-01 PROCEDURE — 6360000004 HC RX CONTRAST MEDICATION: Performed by: STUDENT IN AN ORGANIZED HEALTH CARE EDUCATION/TRAINING PROGRAM

## 2024-09-01 PROCEDURE — 93005 ELECTROCARDIOGRAM TRACING: CPT | Performed by: STUDENT IN AN ORGANIZED HEALTH CARE EDUCATION/TRAINING PROGRAM

## 2024-09-01 PROCEDURE — 85025 COMPLETE CBC W/AUTO DIFF WBC: CPT

## 2024-09-01 RX ORDER — GABAPENTIN 100 MG/1
100 CAPSULE ORAL 3 TIMES DAILY
Status: DISCONTINUED | OUTPATIENT
Start: 2024-09-01 | End: 2024-09-03 | Stop reason: HOSPADM

## 2024-09-01 RX ORDER — MAGNESIUM SULFATE 1 G/100ML
1000 INJECTION INTRAVENOUS PRN
Status: DISCONTINUED | OUTPATIENT
Start: 2024-09-01 | End: 2024-09-03 | Stop reason: HOSPADM

## 2024-09-01 RX ORDER — ONDANSETRON 4 MG/1
4 TABLET, ORALLY DISINTEGRATING ORAL EVERY 8 HOURS PRN
Status: DISCONTINUED | OUTPATIENT
Start: 2024-09-01 | End: 2024-09-03 | Stop reason: HOSPADM

## 2024-09-01 RX ORDER — INSULIN LISPRO 100 [IU]/ML
0-4 INJECTION, SOLUTION INTRAVENOUS; SUBCUTANEOUS
Status: DISCONTINUED | OUTPATIENT
Start: 2024-09-01 | End: 2024-09-01

## 2024-09-01 RX ORDER — INSULIN LISPRO 100 [IU]/ML
0-4 INJECTION, SOLUTION INTRAVENOUS; SUBCUTANEOUS NIGHTLY
Status: DISCONTINUED | OUTPATIENT
Start: 2024-09-01 | End: 2024-09-01

## 2024-09-01 RX ORDER — LEVOTHYROXINE SODIUM 50 UG/1
50 TABLET ORAL DAILY
Status: DISCONTINUED | OUTPATIENT
Start: 2024-09-01 | End: 2024-09-03 | Stop reason: HOSPADM

## 2024-09-01 RX ORDER — MONTELUKAST SODIUM 10 MG/1
10 TABLET ORAL EVERY MORNING
Status: DISCONTINUED | OUTPATIENT
Start: 2024-09-02 | End: 2024-09-03 | Stop reason: HOSPADM

## 2024-09-01 RX ORDER — POTASSIUM CHLORIDE 750 MG/1
10 TABLET, EXTENDED RELEASE ORAL 2 TIMES DAILY
Status: DISCONTINUED | OUTPATIENT
Start: 2024-09-01 | End: 2024-09-03 | Stop reason: HOSPADM

## 2024-09-01 RX ORDER — CETIRIZINE HYDROCHLORIDE 10 MG/1
10 TABLET ORAL DAILY
Status: DISCONTINUED | OUTPATIENT
Start: 2024-09-01 | End: 2024-09-03 | Stop reason: HOSPADM

## 2024-09-01 RX ORDER — POLYETHYLENE GLYCOL 3350 17 G/17G
17 POWDER, FOR SOLUTION ORAL DAILY PRN
Status: DISCONTINUED | OUTPATIENT
Start: 2024-09-01 | End: 2024-09-03 | Stop reason: HOSPADM

## 2024-09-01 RX ORDER — PRAVASTATIN SODIUM 10 MG
10 TABLET ORAL DAILY
Status: DISCONTINUED | OUTPATIENT
Start: 2024-09-01 | End: 2024-09-03 | Stop reason: HOSPADM

## 2024-09-01 RX ORDER — ACETAMINOPHEN 325 MG/1
650 TABLET ORAL EVERY 6 HOURS PRN
Status: DISCONTINUED | OUTPATIENT
Start: 2024-09-01 | End: 2024-09-03 | Stop reason: HOSPADM

## 2024-09-01 RX ORDER — ONDANSETRON 2 MG/ML
4 INJECTION INTRAMUSCULAR; INTRAVENOUS EVERY 6 HOURS PRN
Status: DISCONTINUED | OUTPATIENT
Start: 2024-09-01 | End: 2024-09-03 | Stop reason: HOSPADM

## 2024-09-01 RX ORDER — DEXTROSE MONOHYDRATE 100 MG/ML
INJECTION, SOLUTION INTRAVENOUS CONTINUOUS PRN
Status: DISCONTINUED | OUTPATIENT
Start: 2024-09-01 | End: 2024-09-03 | Stop reason: HOSPADM

## 2024-09-01 RX ORDER — IPRATROPIUM BROMIDE AND ALBUTEROL SULFATE 2.5; .5 MG/3ML; MG/3ML
1 SOLUTION RESPIRATORY (INHALATION)
Status: DISCONTINUED | OUTPATIENT
Start: 2024-09-02 | End: 2024-09-03

## 2024-09-01 RX ORDER — IPRATROPIUM BROMIDE AND ALBUTEROL SULFATE 2.5; .5 MG/3ML; MG/3ML
1 SOLUTION RESPIRATORY (INHALATION)
Status: DISCONTINUED | OUTPATIENT
Start: 2024-09-01 | End: 2024-09-01

## 2024-09-01 RX ORDER — SODIUM CHLORIDE 9 MG/ML
INJECTION, SOLUTION INTRAVENOUS PRN
Status: DISCONTINUED | OUTPATIENT
Start: 2024-09-01 | End: 2024-09-03 | Stop reason: HOSPADM

## 2024-09-01 RX ORDER — FUROSEMIDE 20 MG
20 TABLET ORAL 2 TIMES DAILY
Status: DISCONTINUED | OUTPATIENT
Start: 2024-09-01 | End: 2024-09-03 | Stop reason: HOSPADM

## 2024-09-01 RX ORDER — IOPAMIDOL 755 MG/ML
85 INJECTION, SOLUTION INTRAVASCULAR
Status: COMPLETED | OUTPATIENT
Start: 2024-09-01 | End: 2024-09-01

## 2024-09-01 RX ORDER — INSULIN LISPRO 100 [IU]/ML
0-16 INJECTION, SOLUTION INTRAVENOUS; SUBCUTANEOUS
Status: DISCONTINUED | OUTPATIENT
Start: 2024-09-02 | End: 2024-09-03 | Stop reason: HOSPADM

## 2024-09-01 RX ORDER — PANTOPRAZOLE SODIUM 40 MG/1
40 TABLET, DELAYED RELEASE ORAL
Status: DISCONTINUED | OUTPATIENT
Start: 2024-09-02 | End: 2024-09-03 | Stop reason: HOSPADM

## 2024-09-01 RX ORDER — GLUCAGON 1 MG/ML
1 KIT INJECTION PRN
Status: DISCONTINUED | OUTPATIENT
Start: 2024-09-01 | End: 2024-09-03 | Stop reason: HOSPADM

## 2024-09-01 RX ORDER — BUDESONIDE 0.5 MG/2ML
500 INHALANT ORAL 2 TIMES DAILY
Status: DISCONTINUED | OUTPATIENT
Start: 2024-09-01 | End: 2024-09-02

## 2024-09-01 RX ORDER — INSULIN LISPRO 100 [IU]/ML
0-8 INJECTION, SOLUTION INTRAVENOUS; SUBCUTANEOUS
Status: DISCONTINUED | OUTPATIENT
Start: 2024-09-01 | End: 2024-09-01

## 2024-09-01 RX ORDER — SODIUM CHLORIDE 0.9 % (FLUSH) 0.9 %
10 SYRINGE (ML) INJECTION PRN
Status: DISCONTINUED | OUTPATIENT
Start: 2024-09-01 | End: 2024-09-03 | Stop reason: HOSPADM

## 2024-09-01 RX ORDER — POTASSIUM CHLORIDE 7.45 MG/ML
10 INJECTION INTRAVENOUS PRN
Status: DISCONTINUED | OUTPATIENT
Start: 2024-09-01 | End: 2024-09-03 | Stop reason: HOSPADM

## 2024-09-01 RX ORDER — INSULIN LISPRO 100 [IU]/ML
0-4 INJECTION, SOLUTION INTRAVENOUS; SUBCUTANEOUS NIGHTLY
Status: DISCONTINUED | OUTPATIENT
Start: 2024-09-01 | End: 2024-09-03 | Stop reason: HOSPADM

## 2024-09-01 RX ORDER — SODIUM CHLORIDE 0.9 % (FLUSH) 0.9 %
5-40 SYRINGE (ML) INJECTION EVERY 12 HOURS SCHEDULED
Status: DISCONTINUED | OUTPATIENT
Start: 2024-09-01 | End: 2024-09-03 | Stop reason: HOSPADM

## 2024-09-01 RX ORDER — ACETAMINOPHEN 650 MG/1
650 SUPPOSITORY RECTAL EVERY 6 HOURS PRN
Status: DISCONTINUED | OUTPATIENT
Start: 2024-09-01 | End: 2024-09-03 | Stop reason: HOSPADM

## 2024-09-01 RX ORDER — ENOXAPARIN SODIUM 100 MG/ML
30 INJECTION SUBCUTANEOUS 2 TIMES DAILY
Status: CANCELLED | OUTPATIENT
Start: 2024-09-01

## 2024-09-01 RX ORDER — IPRATROPIUM BROMIDE AND ALBUTEROL SULFATE 2.5; .5 MG/3ML; MG/3ML
1 SOLUTION RESPIRATORY (INHALATION) ONCE
Status: COMPLETED | OUTPATIENT
Start: 2024-09-01 | End: 2024-09-01

## 2024-09-01 RX ORDER — INSULIN LISPRO 100 [IU]/ML
2 INJECTION, SOLUTION INTRAVENOUS; SUBCUTANEOUS ONCE
Status: COMPLETED | OUTPATIENT
Start: 2024-09-01 | End: 2024-09-01

## 2024-09-01 RX ORDER — INSULIN GLARGINE 100 [IU]/ML
20 INJECTION, SOLUTION SUBCUTANEOUS ONCE
Status: COMPLETED | OUTPATIENT
Start: 2024-09-01 | End: 2024-09-01

## 2024-09-01 RX ORDER — IPRATROPIUM BROMIDE AND ALBUTEROL SULFATE 2.5; .5 MG/3ML; MG/3ML
1 SOLUTION RESPIRATORY (INHALATION) EVERY 4 HOURS PRN
Status: DISCONTINUED | OUTPATIENT
Start: 2024-09-01 | End: 2024-09-03 | Stop reason: HOSPADM

## 2024-09-01 RX ORDER — POTASSIUM CHLORIDE 1500 MG/1
40 TABLET, EXTENDED RELEASE ORAL PRN
Status: DISCONTINUED | OUTPATIENT
Start: 2024-09-01 | End: 2024-09-03 | Stop reason: HOSPADM

## 2024-09-01 RX ADMIN — IPRATROPIUM BROMIDE AND ALBUTEROL SULFATE 1 DOSE: 2.5; .5 SOLUTION RESPIRATORY (INHALATION) at 13:31

## 2024-09-01 RX ADMIN — LEVOTHYROXINE SODIUM 50 MCG: 50 TABLET ORAL at 18:22

## 2024-09-01 RX ADMIN — FUROSEMIDE 20 MG: 20 TABLET ORAL at 18:11

## 2024-09-01 RX ADMIN — AZITHROMYCIN DIHYDRATE 500 MG: 500 INJECTION, POWDER, LYOPHILIZED, FOR SOLUTION INTRAVENOUS at 16:06

## 2024-09-01 RX ADMIN — APIXABAN 5 MG: 5 TABLET, FILM COATED ORAL at 21:04

## 2024-09-01 RX ADMIN — INSULIN GLARGINE 20 UNITS: 100 INJECTION, SOLUTION SUBCUTANEOUS at 23:58

## 2024-09-01 RX ADMIN — IPRATROPIUM BROMIDE AND ALBUTEROL SULFATE 1 DOSE: 2.5; .5 SOLUTION RESPIRATORY (INHALATION) at 22:44

## 2024-09-01 RX ADMIN — CEFTRIAXONE 1000 MG: 1 INJECTION, POWDER, FOR SOLUTION INTRAMUSCULAR; INTRAVENOUS at 15:28

## 2024-09-01 RX ADMIN — IOPAMIDOL 85 ML: 755 INJECTION, SOLUTION INTRAVENOUS at 14:02

## 2024-09-01 RX ADMIN — GABAPENTIN 100 MG: 100 CAPSULE ORAL at 21:04

## 2024-09-01 RX ADMIN — INSULIN LISPRO 2 UNITS: 100 INJECTION, SOLUTION INTRAVENOUS; SUBCUTANEOUS at 18:48

## 2024-09-01 RX ADMIN — SODIUM CHLORIDE, PRESERVATIVE FREE 10 ML: 5 INJECTION INTRAVENOUS at 21:03

## 2024-09-01 RX ADMIN — INSULIN LISPRO 4 UNITS: 100 INJECTION, SOLUTION INTRAVENOUS; SUBCUTANEOUS at 18:10

## 2024-09-01 RX ADMIN — INSULIN LISPRO 4 UNITS: 100 INJECTION, SOLUTION INTRAVENOUS; SUBCUTANEOUS at 21:27

## 2024-09-01 RX ADMIN — BUDESONIDE INHALATION 500 MCG: 0.5 SUSPENSION RESPIRATORY (INHALATION) at 22:45

## 2024-09-01 RX ADMIN — WATER 125 MG: 1 INJECTION INTRAMUSCULAR; INTRAVENOUS; SUBCUTANEOUS at 13:31

## 2024-09-01 RX ADMIN — POTASSIUM CHLORIDE 10 MEQ: 750 TABLET, EXTENDED RELEASE ORAL at 21:04

## 2024-09-01 ASSESSMENT — LIFESTYLE VARIABLES
HOW OFTEN DO YOU HAVE A DRINK CONTAINING ALCOHOL: NEVER
HOW OFTEN DO YOU HAVE A DRINK CONTAINING ALCOHOL: NEVER
HOW MANY STANDARD DRINKS CONTAINING ALCOHOL DO YOU HAVE ON A TYPICAL DAY: PATIENT DOES NOT DRINK
HOW OFTEN DO YOU HAVE A DRINK CONTAINING ALCOHOL: NEVER

## 2024-09-01 ASSESSMENT — PAIN - FUNCTIONAL ASSESSMENT
PAIN_FUNCTIONAL_ASSESSMENT: NONE - DENIES PAIN

## 2024-09-01 NOTE — H&P
Klebsiella pneumoniae.                              EKG:   Encounter Date: 09/01/24   EKG 12 Lead (Abn HR)   Result Value    Ventricular Rate 102    Atrial Rate 102    P-R Interval 136    QRS Duration 82    Q-T Interval 350    QTc Calculation (Bazett) 456    P Axis 68    R Axis 0    T Axis 33    Diagnosis      Sinus tachycardiaOtherwise normal ECGWhen compared with ECG of 25-MAR-2024 12:39,No significant change was found        RADIOLOGY  CT CHEST PULMONARY EMBOLISM W CONTRAST   Final Result   1. No evidence of pulmonary embolus.   2. Increasing airspace disease in the right lower lobe posteriorly with   bronchial wall thickening. This may represent pneumonia superimposed on   chronic atelectasis.   3. Chronic elevation of the right hemidiaphragm.   4. Fatty infiltration of the liver.         XR CHEST PORTABLE   Final Result   1.  No radiographic evidence of an acute cardiopulmonary process.   2. Elevation of the right hemidiaphragm, as before.             ASSESSMENT/PLAN:  Acute Respiratory Failure w/Hypoxia   Sepsis 2/2 CAP, suspect a gram positive organism   Asthma/COPD AE  - Criteria: +HR, +RR, +LA, +WBC  - CT chest shows no PE and increasing airspace disease in the RLL posteriorly with bronchial wall thickening   - O2 sats 89% on RA, pt not on O2 at baseline   - tachypnea, dyspnea   - supp O2, wean as tolerated, currently on 2L  - solumedrol for now  - inhaled bronchodilators   - check sputum cultures   - Rocephin and Zithromax D#1  - pulmonology consulted      DM type 2 w/neuropathy   - SSI  - on gabapentin   - carb control diet  - monitor BG     HTN  - on lasix   - monitor BP    HLD  - on statin     Hx of DVT/PE 3/2024  - AC on Eliquis     Hypothyroidism   - on synthroid     GERD  S/p nissen 2015  - on PPI    Sjogren's syndrome  - has followed with rheumatology in the past     Paralyzed right diaphragm   - follows with cardiothoracic surgery     ARJUN  - pt states her CPAP machine was repossessed so she has not

## 2024-09-01 NOTE — ED NOTES
See provider notification for details of notification. MD does not want to order IVF at this time. Informed of meeting SIRS/Sepsis criteria. ABX ordered. /Sowmya Booker RN

## 2024-09-01 NOTE — ED NOTES
Bedside report given to [place]. Patient prepared for transport to room 231. Family at bedside, remains on oxygen, denies questions. Boxed lunch provided. Sowmya Booker RN

## 2024-09-01 NOTE — ED PROVIDER NOTES
[facility] ED     EMERGENCY DEPARTMENT ENCOUNTER         Pt Name: Pauline Cordero   MRN: [id number]   Birthdate 1980   Date of evaluation: 2024   Provider: Jeffrey Duque MD   PCP: Orion Miles MD   Note Started: 1:52 PM EDT 24       Chief Complaint     Pneumonia (Patient diagnosed on Friday with pneumonia, on antibiotics and not any better. History of COPD)      History of Present Illness     Pauline Cordero is a 44 y.o. female who presents with increasing shortness of breath particularly exertion on a background of pneumonia and history of COPD as well as pulmonary embolism on anticoagulation.  The patient obtained her diagnosis of pneumonia via x-ray imaging at urgent care and was started on doxycycline particularly as she simultaneously believes she has a cellulitis of the right thigh.  Finally she also has concern for a UTI and is on additional antibiotics for that.  Despite these therapies she has again had progressive shortness of breath particularly exertion and therefore presents to the emergency department for evaluation.  On arrival here after walking she is very tachycardic dyspneic with increased work of breathing and a room air oxygen saturation of 89%.  She does not normally have a oxygen requirement.      I have reviewed the nursing notes and agree unless otherwise noted.    Review of Systems     Positives and pertinent negatives as per HPI.    Past Medical, Surgical, Family, and Social History     She has a past medical history of Anesthesia complication, Anxiety and depression, Arthritis, Asthma, Chronic bronchitis (HCC), COPD (chronic obstructive pulmonary disease) (HCC), Diabetes mellitus (HCC), Fibromyalgia, Hx of blood clots, Hyperlipidemia, Hypertension, Migraine, Neuropathy, Pneumonia, Rash, Reflux, Sjogren's disease (HCC), Sleep apnea, Thyroid disease, and Wears glasses.  She has a past surgical history that includes  section; Tubal ligation;

## 2024-09-02 PROBLEM — R09.02 HYPOXEMIA: Status: ACTIVE | Noted: 2024-09-02

## 2024-09-02 LAB
ANION GAP SERPL CALCULATED.3IONS-SCNC: 15 MMOL/L (ref 3–16)
ANISOCYTOSIS BLD QL SMEAR: ABNORMAL
BASOPHILS # BLD: 0 K/UL (ref 0–0.2)
BASOPHILS NFR BLD: 0 %
BUN SERPL-MCNC: 17 MG/DL (ref 7–20)
CALCIUM SERPL-MCNC: 9.4 MG/DL (ref 8.3–10.6)
CHLORIDE SERPL-SCNC: 96 MMOL/L (ref 99–110)
CO2 SERPL-SCNC: 22 MMOL/L (ref 21–32)
CREAT SERPL-MCNC: <0.5 MG/DL (ref 0.6–1.1)
DEPRECATED RDW RBC AUTO: 16.8 % (ref 12.4–15.4)
EKG ATRIAL RATE: 102 BPM
EKG DIAGNOSIS: NORMAL
EKG P AXIS: 68 DEGREES
EKG P-R INTERVAL: 136 MS
EKG Q-T INTERVAL: 350 MS
EKG QRS DURATION: 82 MS
EKG QTC CALCULATION (BAZETT): 456 MS
EKG R AXIS: 0 DEGREES
EKG T AXIS: 33 DEGREES
EKG VENTRICULAR RATE: 102 BPM
EOSINOPHIL # BLD: 0 K/UL (ref 0–0.6)
EOSINOPHIL NFR BLD: 0 %
EST. AVERAGE GLUCOSE BLD GHB EST-MCNC: 231.7 MG/DL
GFR SERPLBLD CREATININE-BSD FMLA CKD-EPI: >90 ML/MIN/{1.73_M2}
GLUCOSE BLD-MCNC: 264 MG/DL (ref 70–99)
GLUCOSE BLD-MCNC: 288 MG/DL (ref 70–99)
GLUCOSE BLD-MCNC: 307 MG/DL (ref 70–99)
GLUCOSE BLD-MCNC: 352 MG/DL (ref 70–99)
GLUCOSE BLD-MCNC: 384 MG/DL (ref 70–99)
GLUCOSE BLD-MCNC: 423 MG/DL (ref 70–99)
GLUCOSE SERPL-MCNC: 354 MG/DL (ref 70–99)
HBA1C MFR BLD: 9.7 %
HCT VFR BLD AUTO: 39.9 % (ref 36–48)
HGB BLD-MCNC: 12.9 G/DL (ref 12–16)
LYMPHOCYTES # BLD: 0.5 K/UL (ref 1–5.1)
LYMPHOCYTES NFR BLD: 2 %
MCH RBC QN AUTO: 25 PG (ref 26–34)
MCHC RBC AUTO-ENTMCNC: 32.3 G/DL (ref 31–36)
MCV RBC AUTO: 77.6 FL (ref 80–100)
MONOCYTES # BLD: 0 K/UL (ref 0–1.3)
MONOCYTES NFR BLD: 0 %
NEUTROPHILS # BLD: 16.6 K/UL (ref 1.7–7.7)
NEUTROPHILS NFR BLD: 97 %
NEUTS HYPERSEG BLD QL SMEAR: PRESENT
PATH INTERP BLD-IMP: YES
PERFORMED ON: ABNORMAL
PLATELET # BLD AUTO: 324 K/UL (ref 135–450)
PLATELET BLD QL SMEAR: ADEQUATE
PMV BLD AUTO: 8.5 FL (ref 5–10.5)
POIKILOCYTOSIS BLD QL SMEAR: ABNORMAL
POTASSIUM SERPL-SCNC: 4.4 MMOL/L (ref 3.5–5.1)
RBC # BLD AUTO: 5.14 M/UL (ref 4–5.2)
SLIDE REVIEW: ABNORMAL
SODIUM SERPL-SCNC: 133 MMOL/L (ref 136–145)
VARIANT LYMPHS NFR BLD MANUAL: 1 % (ref 0–6)
WBC # BLD AUTO: 17.1 K/UL (ref 4–11)

## 2024-09-02 PROCEDURE — 6370000000 HC RX 637 (ALT 250 FOR IP)

## 2024-09-02 PROCEDURE — 94669 MECHANICAL CHEST WALL OSCILL: CPT

## 2024-09-02 PROCEDURE — 6360000002 HC RX W HCPCS

## 2024-09-02 PROCEDURE — 94640 AIRWAY INHALATION TREATMENT: CPT

## 2024-09-02 PROCEDURE — 94761 N-INVAS EAR/PLS OXIMETRY MLT: CPT

## 2024-09-02 PROCEDURE — 99232 SBSQ HOSP IP/OBS MODERATE 35: CPT | Performed by: INTERNAL MEDICINE

## 2024-09-02 PROCEDURE — 99255 IP/OBS CONSLTJ NEW/EST HI 80: CPT | Performed by: INTERNAL MEDICINE

## 2024-09-02 PROCEDURE — 2580000003 HC RX 258

## 2024-09-02 PROCEDURE — 6370000000 HC RX 637 (ALT 250 FOR IP): Performed by: INTERNAL MEDICINE

## 2024-09-02 PROCEDURE — 2700000000 HC OXYGEN THERAPY PER DAY

## 2024-09-02 PROCEDURE — 80048 BASIC METABOLIC PNL TOTAL CA: CPT

## 2024-09-02 PROCEDURE — 93010 ELECTROCARDIOGRAM REPORT: CPT | Performed by: INTERNAL MEDICINE

## 2024-09-02 PROCEDURE — 87070 CULTURE OTHR SPECIMN AEROBIC: CPT

## 2024-09-02 PROCEDURE — 87205 SMEAR GRAM STAIN: CPT

## 2024-09-02 PROCEDURE — 1200000000 HC SEMI PRIVATE

## 2024-09-02 PROCEDURE — 85025 COMPLETE CBC W/AUTO DIFF WBC: CPT

## 2024-09-02 PROCEDURE — 36415 COLL VENOUS BLD VENIPUNCTURE: CPT

## 2024-09-02 RX ORDER — GUAIFENESIN/DEXTROMETHORPHAN 100-10MG/5
10 SYRUP ORAL EVERY 4 HOURS PRN
Status: DISCONTINUED | OUTPATIENT
Start: 2024-09-02 | End: 2024-09-03 | Stop reason: HOSPADM

## 2024-09-02 RX ORDER — INSULIN LISPRO 100 [IU]/ML
10 INJECTION, SOLUTION INTRAVENOUS; SUBCUTANEOUS ONCE
Status: COMPLETED | OUTPATIENT
Start: 2024-09-02 | End: 2024-09-02

## 2024-09-02 RX ORDER — INSULIN GLARGINE 100 [IU]/ML
20 INJECTION, SOLUTION SUBCUTANEOUS ONCE
Status: COMPLETED | OUTPATIENT
Start: 2024-09-02 | End: 2024-09-02

## 2024-09-02 RX ORDER — INSULIN LISPRO 100 [IU]/ML
10 INJECTION, SOLUTION INTRAVENOUS; SUBCUTANEOUS ONCE
Status: DISCONTINUED | OUTPATIENT
Start: 2024-09-02 | End: 2024-09-02

## 2024-09-02 RX ADMIN — PRAVASTATIN SODIUM 10 MG: 10 TABLET ORAL at 08:47

## 2024-09-02 RX ADMIN — INSULIN LISPRO 12 UNITS: 100 INJECTION, SOLUTION INTRAVENOUS; SUBCUTANEOUS at 08:47

## 2024-09-02 RX ADMIN — APIXABAN 5 MG: 5 TABLET, FILM COATED ORAL at 08:47

## 2024-09-02 RX ADMIN — MONTELUKAST SODIUM 10 MG: 10 TABLET, COATED ORAL at 08:47

## 2024-09-02 RX ADMIN — CEFTRIAXONE 1000 MG: 1 INJECTION, POWDER, FOR SOLUTION INTRAMUSCULAR; INTRAVENOUS at 17:37

## 2024-09-02 RX ADMIN — INSULIN LISPRO 16 UNITS: 100 INJECTION, SOLUTION INTRAVENOUS; SUBCUTANEOUS at 17:03

## 2024-09-02 RX ADMIN — WATER 40 MG: 1 INJECTION INTRAMUSCULAR; INTRAVENOUS; SUBCUTANEOUS at 00:01

## 2024-09-02 RX ADMIN — GABAPENTIN 100 MG: 100 CAPSULE ORAL at 13:33

## 2024-09-02 RX ADMIN — AZITHROMYCIN DIHYDRATE 500 MG: 500 INJECTION, POWDER, LYOPHILIZED, FOR SOLUTION INTRAVENOUS at 15:42

## 2024-09-02 RX ADMIN — ACETAMINOPHEN 650 MG: 325 TABLET ORAL at 00:14

## 2024-09-02 RX ADMIN — FUROSEMIDE 20 MG: 20 TABLET ORAL at 17:38

## 2024-09-02 RX ADMIN — IPRATROPIUM BROMIDE AND ALBUTEROL SULFATE 1 DOSE: 2.5; .5 SOLUTION RESPIRATORY (INHALATION) at 19:14

## 2024-09-02 RX ADMIN — INSULIN LISPRO 4 UNITS: 100 INJECTION, SOLUTION INTRAVENOUS; SUBCUTANEOUS at 21:15

## 2024-09-02 RX ADMIN — WATER 40 MG: 1 INJECTION INTRAMUSCULAR; INTRAVENOUS; SUBCUTANEOUS at 11:30

## 2024-09-02 RX ADMIN — POTASSIUM CHLORIDE 10 MEQ: 750 TABLET, EXTENDED RELEASE ORAL at 08:47

## 2024-09-02 RX ADMIN — BUDESONIDE INHALATION 500 MCG: 0.5 SUSPENSION RESPIRATORY (INHALATION) at 07:54

## 2024-09-02 RX ADMIN — INSULIN LISPRO 4 UNITS: 100 INJECTION, SOLUTION INTRAVENOUS; SUBCUTANEOUS at 00:01

## 2024-09-02 RX ADMIN — LEVOTHYROXINE SODIUM 50 MCG: 50 TABLET ORAL at 05:30

## 2024-09-02 RX ADMIN — IPRATROPIUM BROMIDE AND ALBUTEROL SULFATE 1 DOSE: 2.5; .5 SOLUTION RESPIRATORY (INHALATION) at 15:26

## 2024-09-02 RX ADMIN — GABAPENTIN 100 MG: 100 CAPSULE ORAL at 21:13

## 2024-09-02 RX ADMIN — CETIRIZINE HYDROCHLORIDE 10 MG: 10 TABLET ORAL at 08:47

## 2024-09-02 RX ADMIN — INSULIN GLARGINE 20 UNITS: 100 INJECTION, SOLUTION SUBCUTANEOUS at 21:14

## 2024-09-02 RX ADMIN — SODIUM CHLORIDE: 9 INJECTION, SOLUTION INTRAVENOUS at 15:41

## 2024-09-02 RX ADMIN — SODIUM CHLORIDE, PRESERVATIVE FREE 10 ML: 5 INJECTION INTRAVENOUS at 21:14

## 2024-09-02 RX ADMIN — IPRATROPIUM BROMIDE AND ALBUTEROL SULFATE 1 DOSE: 2.5; .5 SOLUTION RESPIRATORY (INHALATION) at 11:21

## 2024-09-02 RX ADMIN — GABAPENTIN 100 MG: 100 CAPSULE ORAL at 08:47

## 2024-09-02 RX ADMIN — SODIUM CHLORIDE, PRESERVATIVE FREE 10 ML: 5 INJECTION INTRAVENOUS at 08:49

## 2024-09-02 RX ADMIN — IPRATROPIUM BROMIDE AND ALBUTEROL SULFATE 1 DOSE: 2.5; .5 SOLUTION RESPIRATORY (INHALATION) at 07:54

## 2024-09-02 RX ADMIN — APIXABAN 5 MG: 5 TABLET, FILM COATED ORAL at 21:13

## 2024-09-02 RX ADMIN — GUAIFENESIN AND DEXTROMETHORPHAN 10 ML: 100; 10 SYRUP ORAL at 21:37

## 2024-09-02 RX ADMIN — INSULIN LISPRO 8 UNITS: 100 INJECTION, SOLUTION INTRAVENOUS; SUBCUTANEOUS at 11:30

## 2024-09-02 RX ADMIN — POTASSIUM CHLORIDE 10 MEQ: 750 TABLET, EXTENDED RELEASE ORAL at 21:13

## 2024-09-02 RX ADMIN — ACETAMINOPHEN 650 MG: 325 TABLET ORAL at 09:58

## 2024-09-02 RX ADMIN — INSULIN LISPRO 10 UNITS: 100 INJECTION, SOLUTION INTRAVENOUS; SUBCUTANEOUS at 21:36

## 2024-09-02 RX ADMIN — PANTOPRAZOLE SODIUM 40 MG: 40 TABLET, DELAYED RELEASE ORAL at 05:30

## 2024-09-02 ASSESSMENT — PAIN DESCRIPTION - DESCRIPTORS
DESCRIPTORS: ACHING;DISCOMFORT
DESCRIPTORS: ACHING;DISCOMFORT;SHARP

## 2024-09-02 ASSESSMENT — PAIN - FUNCTIONAL ASSESSMENT: PAIN_FUNCTIONAL_ASSESSMENT: ACTIVITIES ARE NOT PREVENTED

## 2024-09-02 ASSESSMENT — PAIN SCALES - WONG BAKER: WONGBAKER_NUMERICALRESPONSE: NO HURT

## 2024-09-02 ASSESSMENT — PAIN SCALES - GENERAL
PAINLEVEL_OUTOF10: 0
PAINLEVEL_OUTOF10: 0
PAINLEVEL_OUTOF10: 9
PAINLEVEL_OUTOF10: 5
PAINLEVEL_OUTOF10: 3

## 2024-09-02 ASSESSMENT — PAIN DESCRIPTION - ORIENTATION: ORIENTATION: MID

## 2024-09-02 ASSESSMENT — PAIN DESCRIPTION - LOCATION
LOCATION: HEAD
LOCATION: HEAD

## 2024-09-02 NOTE — CONSULTS
syncope  Hematological: Negative for adenopathy  Psychiatric/Behavorial: Negative for anxiety    PHYSICAL EXAM: /87   Pulse 85   Temp 97 °F (36.1 °C) (Oral)   Resp 18   Ht 1.651 m (5' 5\")   Wt (!) 137.1 kg (302 lb 5 oz)   SpO2 96%   BMI 50.31 kg/m²  on RA  Constitutional:  No acute distress.   Eyes: PERRL. Conjunctivae anicteric.   ENT: Normal nose. Normal tongue.    Neck:  Trachea is midline.   Respiratory: No accessory muscle usage.  Decreased breath sounds. No wheezes. No rales. No Rhonchi.  Cardiovascular: Normal S1S2. No digit clubbing. No digit cyanosis. No LE edema.    Gastrointestinal: No mass palpated. No tenderness palpated.   Skin: No rash on the exposed extremities.   Psychiatric: No anxiety or Agitation. Alert and Oriented to person, place and time.    CBC:   Recent Labs     09/01/24  1200 09/02/24  0550   WBC 13.5* 17.1*   HGB 12.4 12.9   HCT 39.1 39.9   MCV 77.9* 77.6*    324     BMP:   Recent Labs     09/01/24  1200 09/02/24  0550   * 133*   K 4.1 4.4   CL 98* 96*   CO2 20* 22   BUN 16 17   CREATININE 0.7 <0.5*        Recent Labs     09/01/24  1200   AST 35   ALT 31   BILITOT <0.2   ALKPHOS 122     No results for input(s): \"PROTIME\", \"INR\" in the last 72 hours.  Recent Labs     09/01/24  1535   COLORU Yellow   PHUR 6.0   CLARITYU Clear   LEUKOCYTESUR Negative   UROBILINOGEN 0.2   BILIRUBINUR Negative   BLOODU Negative   GLUCOSEU >=1000*     No results for input(s): \"PHART\", \"VNB8SPW\", \"PO2ART\" in the last 72 hours.    Chest imaging was reviewed by me and showed CTPA:   1. No evidence of pulmonary embolus.   2. Increasing airspace disease in the right lower lobe posteriorly with   bronchial wall thickening. This may represent pneumonia superimposed on   chronic atelectasis.   3. Chronic elevation of the right hemidiaphragm.   4. Fatty infiltration of the liver.       ASSESSMENT:  CAP  Asthma with AE  ARJUN not on cpap  DM2  PE 3/2024  Elevated right

## 2024-09-02 NOTE — PLAN OF CARE
Problem: Discharge Planning  Goal: Discharge to home or other facility with appropriate resources  9/2/2024 0921 by Irma Shane RN  Outcome: Progressing  9/1/2024 2131 by Suman Sorensen RN  Outcome: Progressing     Problem: Safety - Adult  Goal: Free from fall injury  9/2/2024 0921 by Irma Shane RN  Outcome: Progressing  9/1/2024 2131 by Suman Sorensen RN  Outcome: Progressing     Problem: ABCDS Injury Assessment  Goal: Absence of physical injury  9/2/2024 0921 by Irma Shane RN  Outcome: Progressing  9/1/2024 2131 by Suman Sorensen RN  Outcome: Progressing     Problem: Pain  Goal: Verbalizes/displays adequate comfort level or baseline comfort level  9/2/2024 0921 by Irma Shane RN  Outcome: Progressing  9/1/2024 2131 by Suman Sorensen RN  Outcome: Progressing     Problem: Skin/Tissue Integrity  Goal: Absence of new skin breakdown  Description: 1.  Monitor for areas of redness and/or skin breakdown  2.  Assess vascular access sites hourly  3.  Every 4-6 hours minimum:  Change oxygen saturation probe site  4.  Every 4-6 hours:  If on nasal continuous positive airway pressure, respiratory therapy assess nares and determine need for appliance change or resting period.  9/2/2024 0921 by Irma Shane RN  Outcome: Progressing  9/1/2024 2131 by Suman Sorensen RN  Outcome: Progressing

## 2024-09-03 ENCOUNTER — PATIENT MESSAGE (OUTPATIENT)
Dept: PULMONOLOGY | Age: 44
End: 2024-09-03

## 2024-09-03 VITALS
DIASTOLIC BLOOD PRESSURE: 66 MMHG | BODY MASS INDEX: 48.82 KG/M2 | WEIGHT: 293 LBS | SYSTOLIC BLOOD PRESSURE: 116 MMHG | TEMPERATURE: 97.7 F | HEIGHT: 65 IN | OXYGEN SATURATION: 94 % | RESPIRATION RATE: 18 BRPM | HEART RATE: 99 BPM

## 2024-09-03 PROBLEM — J45.901 EXACERBATION OF ASTHMA: Status: ACTIVE | Noted: 2024-09-03

## 2024-09-03 LAB
ANION GAP SERPL CALCULATED.3IONS-SCNC: 13 MMOL/L (ref 3–16)
BASOPHILS # BLD: 0 K/UL (ref 0–0.2)
BASOPHILS NFR BLD: 0.2 %
BUN SERPL-MCNC: 22 MG/DL (ref 7–20)
CALCIUM SERPL-MCNC: 9.2 MG/DL (ref 8.3–10.6)
CHLORIDE SERPL-SCNC: 94 MMOL/L (ref 99–110)
CO2 SERPL-SCNC: 23 MMOL/L (ref 21–32)
CREAT SERPL-MCNC: 0.6 MG/DL (ref 0.6–1.1)
DEPRECATED RDW RBC AUTO: 16.6 % (ref 12.4–15.4)
EOSINOPHIL # BLD: 0 K/UL (ref 0–0.6)
EOSINOPHIL NFR BLD: 0.3 %
GFR SERPLBLD CREATININE-BSD FMLA CKD-EPI: >90 ML/MIN/{1.73_M2}
GLUCOSE BLD-MCNC: 253 MG/DL (ref 70–99)
GLUCOSE BLD-MCNC: 304 MG/DL (ref 70–99)
GLUCOSE BLD-MCNC: 360 MG/DL (ref 70–99)
GLUCOSE BLD-MCNC: 377 MG/DL (ref 70–99)
GLUCOSE BLD-MCNC: 415 MG/DL (ref 70–99)
GLUCOSE SERPL-MCNC: 390 MG/DL (ref 70–99)
HCT VFR BLD AUTO: 38.4 % (ref 36–48)
HGB BLD-MCNC: 12.4 G/DL (ref 12–16)
LYMPHOCYTES # BLD: 1.5 K/UL (ref 1–5.1)
LYMPHOCYTES NFR BLD: 10.7 %
MCH RBC QN AUTO: 25 PG (ref 26–34)
MCHC RBC AUTO-ENTMCNC: 32.3 G/DL (ref 31–36)
MCV RBC AUTO: 77.4 FL (ref 80–100)
MONOCYTES # BLD: 0.4 K/UL (ref 0–1.3)
MONOCYTES NFR BLD: 2.8 %
NEUTROPHILS # BLD: 12.5 K/UL (ref 1.7–7.7)
NEUTROPHILS NFR BLD: 86 %
PATH INTERP BLD-IMP: NORMAL
PERFORMED ON: ABNORMAL
PLATELET # BLD AUTO: 308 K/UL (ref 135–450)
PMV BLD AUTO: 8.8 FL (ref 5–10.5)
POTASSIUM SERPL-SCNC: 4.6 MMOL/L (ref 3.5–5.1)
RBC # BLD AUTO: 4.97 M/UL (ref 4–5.2)
SODIUM SERPL-SCNC: 130 MMOL/L (ref 136–145)
WBC # BLD AUTO: 14.5 K/UL (ref 4–11)

## 2024-09-03 PROCEDURE — 36415 COLL VENOUS BLD VENIPUNCTURE: CPT

## 2024-09-03 PROCEDURE — 6370000000 HC RX 637 (ALT 250 FOR IP): Performed by: INTERNAL MEDICINE

## 2024-09-03 PROCEDURE — 6360000002 HC RX W HCPCS

## 2024-09-03 PROCEDURE — 99233 SBSQ HOSP IP/OBS HIGH 50: CPT | Performed by: INTERNAL MEDICINE

## 2024-09-03 PROCEDURE — 6370000000 HC RX 637 (ALT 250 FOR IP)

## 2024-09-03 PROCEDURE — 94669 MECHANICAL CHEST WALL OSCILL: CPT

## 2024-09-03 PROCEDURE — 2580000003 HC RX 258

## 2024-09-03 PROCEDURE — 94010 BREATHING CAPACITY TEST: CPT

## 2024-09-03 PROCEDURE — 80048 BASIC METABOLIC PNL TOTAL CA: CPT

## 2024-09-03 PROCEDURE — 94761 N-INVAS EAR/PLS OXIMETRY MLT: CPT

## 2024-09-03 PROCEDURE — 85025 COMPLETE CBC W/AUTO DIFF WBC: CPT

## 2024-09-03 PROCEDURE — 94640 AIRWAY INHALATION TREATMENT: CPT

## 2024-09-03 RX ORDER — GABAPENTIN 100 MG/1
100 CAPSULE ORAL 3 TIMES DAILY
Qty: 90 CAPSULE | Refills: 0 | Status: SHIPPED | OUTPATIENT
Start: 2024-09-03 | End: 2024-10-03

## 2024-09-03 RX ORDER — PREDNISONE 10 MG/1
TABLET ORAL
Qty: 26 TABLET | Refills: 0 | Status: SHIPPED | OUTPATIENT
Start: 2024-09-03 | End: 2024-09-14

## 2024-09-03 RX ORDER — INSULIN LISPRO 100 [IU]/ML
0-4 INJECTION, SOLUTION INTRAVENOUS; SUBCUTANEOUS
Status: DISCONTINUED | OUTPATIENT
Start: 2024-09-03 | End: 2024-09-03

## 2024-09-03 RX ORDER — GLUCOSAMINE HCL/CHONDROITIN SU 500-400 MG
CAPSULE ORAL
Qty: 200 STRIP | Refills: 0 | Status: SHIPPED | OUTPATIENT
Start: 2024-09-03

## 2024-09-03 RX ORDER — INSULIN GLARGINE 100 [IU]/ML
20 INJECTION, SOLUTION SUBCUTANEOUS NIGHTLY
Qty: 5 ADJUSTABLE DOSE PRE-FILLED PEN SYRINGE | Refills: 0 | Status: SHIPPED | OUTPATIENT
Start: 2024-09-03

## 2024-09-03 RX ORDER — INSULIN LISPRO 100 [IU]/ML
0-4 INJECTION, SOLUTION INTRAVENOUS; SUBCUTANEOUS
Qty: 10 ML | Refills: 0 | Status: SHIPPED | OUTPATIENT
Start: 2024-09-03

## 2024-09-03 RX ORDER — PREDNISONE 20 MG/1
40 TABLET ORAL DAILY
Status: DISCONTINUED | OUTPATIENT
Start: 2024-09-03 | End: 2024-09-03 | Stop reason: HOSPADM

## 2024-09-03 RX ORDER — INSULIN GLARGINE 100 [IU]/ML
15 INJECTION, SOLUTION SUBCUTANEOUS ONCE
Status: COMPLETED | OUTPATIENT
Start: 2024-09-03 | End: 2024-09-03

## 2024-09-03 RX ORDER — INSULIN GLARGINE 100 [IU]/ML
30 INJECTION, SOLUTION SUBCUTANEOUS NIGHTLY
Status: DISCONTINUED | OUTPATIENT
Start: 2024-09-03 | End: 2024-09-03 | Stop reason: HOSPADM

## 2024-09-03 RX ORDER — IPRATROPIUM BROMIDE AND ALBUTEROL SULFATE 2.5; .5 MG/3ML; MG/3ML
1 SOLUTION RESPIRATORY (INHALATION)
Status: DISCONTINUED | OUTPATIENT
Start: 2024-09-03 | End: 2024-09-03 | Stop reason: HOSPADM

## 2024-09-03 RX ORDER — LEVOFLOXACIN 500 MG/1
500 TABLET, FILM COATED ORAL DAILY
Qty: 5 TABLET | Refills: 0 | Status: SHIPPED | OUTPATIENT
Start: 2024-09-03 | End: 2024-09-08

## 2024-09-03 RX ORDER — BLOOD-GLUCOSE METER
1 KIT MISCELLANEOUS DAILY
Qty: 1 KIT | Refills: 0 | Status: SHIPPED | OUTPATIENT
Start: 2024-09-03

## 2024-09-03 RX ORDER — LANCETS 30 GAUGE
1 EACH MISCELLANEOUS 4 TIMES DAILY
Qty: 200 EACH | Refills: 0 | Status: SHIPPED | OUTPATIENT
Start: 2024-09-03

## 2024-09-03 RX ORDER — FUROSEMIDE 20 MG
20 TABLET ORAL 2 TIMES DAILY
Qty: 60 TABLET | Refills: 3 | Status: SHIPPED | OUTPATIENT
Start: 2024-09-03

## 2024-09-03 RX ADMIN — INSULIN LISPRO 8 UNITS: 100 INJECTION, SOLUTION INTRAVENOUS; SUBCUTANEOUS at 11:50

## 2024-09-03 RX ADMIN — IPRATROPIUM BROMIDE AND ALBUTEROL SULFATE 1 DOSE: 2.5; .5 SOLUTION RESPIRATORY (INHALATION) at 07:45

## 2024-09-03 RX ADMIN — APIXABAN 5 MG: 5 TABLET, FILM COATED ORAL at 08:47

## 2024-09-03 RX ADMIN — GUAIFENESIN AND DEXTROMETHORPHAN 10 ML: 100; 10 SYRUP ORAL at 04:22

## 2024-09-03 RX ADMIN — INSULIN LISPRO 16 UNITS: 100 INJECTION, SOLUTION INTRAVENOUS; SUBCUTANEOUS at 08:48

## 2024-09-03 RX ADMIN — PANTOPRAZOLE SODIUM 40 MG: 40 TABLET, DELAYED RELEASE ORAL at 06:28

## 2024-09-03 RX ADMIN — SODIUM CHLORIDE, PRESERVATIVE FREE 10 ML: 5 INJECTION INTRAVENOUS at 08:48

## 2024-09-03 RX ADMIN — GABAPENTIN 100 MG: 100 CAPSULE ORAL at 14:37

## 2024-09-03 RX ADMIN — CETIRIZINE HYDROCHLORIDE 10 MG: 10 TABLET ORAL at 08:48

## 2024-09-03 RX ADMIN — LEVOTHYROXINE SODIUM 50 MCG: 50 TABLET ORAL at 06:28

## 2024-09-03 RX ADMIN — GABAPENTIN 100 MG: 100 CAPSULE ORAL at 08:48

## 2024-09-03 RX ADMIN — PRAVASTATIN SODIUM 10 MG: 10 TABLET ORAL at 08:48

## 2024-09-03 RX ADMIN — POTASSIUM CHLORIDE 10 MEQ: 750 TABLET, EXTENDED RELEASE ORAL at 08:47

## 2024-09-03 RX ADMIN — PREDNISONE 40 MG: 20 TABLET ORAL at 11:53

## 2024-09-03 RX ADMIN — WATER 40 MG: 1 INJECTION INTRAMUSCULAR; INTRAVENOUS; SUBCUTANEOUS at 00:08

## 2024-09-03 RX ADMIN — IPRATROPIUM BROMIDE AND ALBUTEROL SULFATE 1 DOSE: 2.5; .5 SOLUTION RESPIRATORY (INHALATION) at 14:20

## 2024-09-03 RX ADMIN — MONTELUKAST SODIUM 10 MG: 10 TABLET, COATED ORAL at 08:48

## 2024-09-03 RX ADMIN — INSULIN GLARGINE 15 UNITS: 100 INJECTION, SOLUTION SUBCUTANEOUS at 08:48

## 2024-09-03 ASSESSMENT — COPD QUESTIONNAIRES
QUESTION2_CHESTPHLEGM: 1
QUESTION1_COUGHFREQUENCY: 3
QUESTION7_SLEEPQUALITY: 4
CAT_TOTALSCORE: 26
QUESTION6_LEAVINGHOUSE: 3
QUESTION4_WALKINCLINE: 5
QUESTION5_HOMEACTIVITIES: 3
QUESTION8_ENERGYLEVEL: 4
QUESTION3_CHESTTIGHTNESS: 3

## 2024-09-03 NOTE — PLAN OF CARE
Patient provided a COPD Educational Folder that includes the following materials:     [x]  Engagement Labs Booklet: Managing your COPD  [x]  ALA: Getting the Most Out of Medication Delivery Devices  [x]  ALA: My COPD Action Plan  [x]  [facility]: [facility]   [x]  Smoking Cessation Classes  [x]  Outpatient Spiritual Care Services  [x]  Magnet: Signs of COPD    PATIENT/CAREGIVER TEACHING:   Level of patient/caregiver understanding able to:   [x] Verbalize understanding   [] Demonstrate understanding       [] Teach back        [] Needs reinforcement     []  Other:     Electronically signed by Maria Chan RN on 9/3/2024 at 10:51 AM

## 2024-09-03 NOTE — PLAN OF CARE
Patient provided a COPD Educational Folder that includes the following materials:     [x]  Staff Ranker Booklet: Managing your COPD  [x]  ALA: Getting the Most Out of Medication Delivery Devices  [x]  ALA: My COPD Action Plan  [x]  [facility]: [facility]   [x]  Smoking Cessation Classes  [x]  Outpatient Spiritual Care Services  [x]  Magnet: Signs of COPD    PATIENT/CAREGIVER TEACHING:   Level of patient/caregiver understanding able to:   [x] Verbalize understanding   [] Demonstrate understanding       [] Teach back        [] Needs reinforcement     []  Other:     Electronically signed by Thierno Herring RN on 9/3/2024 at 12:38 AM

## 2024-09-03 NOTE — PLAN OF CARE
Problem: Discharge Planning  Goal: Discharge to home or other facility with appropriate resources  9/3/2024 1050 by Maria Chan RN  Outcome: Progressing  9/3/2024 0031 by Thierno Herring RN  Outcome: Progressing  Flowsheets (Taken 9/2/2024 2112)  Discharge to home or other facility with appropriate resources: Identify barriers to discharge with patient and caregiver     Problem: Safety - Adult  Goal: Free from fall injury  9/3/2024 1050 by Maria Chan RN  Outcome: Progressing  9/3/2024 0031 by Thierno Herring RN  Outcome: Progressing     Problem: ABCDS Injury Assessment  Goal: Absence of physical injury  9/3/2024 1050 by Maria Chan RN  Outcome: Progressing  9/3/2024 0031 by Thierno Herring RN  Outcome: Progressing     Problem: Pain  Goal: Verbalizes/displays adequate comfort level or baseline comfort level  9/3/2024 1050 by Maria Chan RN  Outcome: Progressing  Flowsheets (Taken 9/3/2024 0215 by Thierno Herring RN)  Verbalizes/displays adequate comfort level or baseline comfort level:   Encourage patient to monitor pain and request assistance   Assess pain using appropriate pain scale  9/3/2024 0031 by Thierno Herring RN  Outcome: Progressing  Flowsheets (Taken 9/2/2024 2108)  Verbalizes/displays adequate comfort level or baseline comfort level:   Encourage patient to monitor pain and request assistance   Assess pain using appropriate pain scale     Problem: Skin/Tissue Integrity  Goal: Absence of new skin breakdown  Description: 1.  Monitor for areas of redness and/or skin breakdown  2.  Assess vascular access sites hourly  3.  Every 4-6 hours minimum:  Change oxygen saturation probe site  4.  Every 4-6 hours:  If on nasal continuous positive airway pressure, respiratory therapy assess nares and determine need for appliance change or resting period.  9/3/2024 1050 by Maria Chan RN  Outcome: Progressing  9/3/2024 0031 by Thierno Herring RN  Outcome: Progressing     Problem: Chronic Conditions and

## 2024-09-03 NOTE — DISCHARGE INSTRUCTIONS
insulin lispro (HUMALOG,ADMELOG) injection vial 0-4 Units  0-4 Units, SubCUTAneous, 3 TIMES DAILY WITH MEALS, First dose today at 1700, Until Discontinued  **Corrective Low Dose Algorithm**  Glucose: Dose:    No Insulin  200-249 1 Unit  250-299 2 Units  300-349 3 Units  Over 349 4 Units and notify physician

## 2024-09-03 NOTE — CARE COORDINATION
Review of chart screened for potential discharge planning needs.  Contact with _pt____(pt/support person)   Role of discharge planner explained with verbalized understanding.  No Needs identified by pt/support person for barriers to discharge.   Readmission Risk Score:16%  No discharge needs noted at this time.   MD and bedside RN please notify CM if needs arise for any discharge interventions.

## 2024-09-03 NOTE — PROGRESS NOTES
09/01/24 2200   RT Protocol   History Pulmonary Disease 2   Respiratory pattern 0   Breath sounds 2   Cough 0   Indications for Bronchodilator Therapy Decreased or absent breath sounds   Bronchodilator Assessment Score 4     RT Inhaler-Nebulizer Bronchodilator Protocol Note    There is a bronchodilator order in the chart from a provider indicating to follow the RT Bronchodilator Protocol and there is an “Initiate RT Inhaler-Nebulizer Bronchodilator Protocol” order as well (see protocol at bottom of note).    CXR Findings:  XR CHEST PORTABLE    Result Date: 9/1/2024  1.  No radiographic evidence of an acute cardiopulmonary process. 2. Elevation of the right hemidiaphragm, as before.       The findings from the last RT Protocol Assessment were as follows:   History Pulmonary Disease: Chronic pulmonary disease  Respiratory Pattern: Regular pattern and RR 12-20 bpm  Breath Sounds: Slightly diminished and/or crackles  Cough: Strong, spontaneous, non-productive  Indication for Bronchodilator Therapy: Decreased or absent breath sounds  Bronchodilator Assessment Score: 4    Aerosolized bronchodilator medication orders have been revised according to the RT Inhaler-Nebulizer Bronchodilator Protocol below.    Respiratory Therapist to perform RT Therapy Protocol Assessment initially then follow the protocol.  Repeat RT Therapy Protocol Assessment PRN for score 0-3 or on second treatment, BID, and PRN for scores above 3.    No Indications - adjust the frequency to every 6 hours PRN wheezing or bronchospasm, if no treatments needed after 48 hours then discontinue using Per Protocol order mode.     If indication present, adjust the RT bronchodilator orders based on the Bronchodilator Assessment Score as indicated below.  Use Inhaler orders unless patient has one or more of the following: on home nebulizer, not able to hold breath for 10 seconds, is not alert and oriented, cannot activate and use MDI correctly, or respiratory 
   09/03/24 1000   RT Protocol   History Pulmonary Disease 2   Respiratory pattern 2   Breath sounds 0   Cough 0   Indications for Bronchodilator Therapy On home bronchodilators   Bronchodilator Assessment Score 4       
   09/03/24 1400   COPD Assessment (CAT Score)   Cough Assessment 3   Phlegm Assessment 1   Chest tightness 3   Walking on an incline 5   Home Activities 3   Confident Leaving The Home 3   Sleeping Soundly 4   Have Energy 4   Assessment Score 26   $RT COPD Assessment Yes       
And antibiotic pulmonary Progress Note    CC: Asthma pneumonia    Subjective:   Less short of breath today  Room air  Some cough      Intake/Output Summary (Last 24 hours) at 9/3/2024 0756  Last data filed at 9/2/2024 2108  Gross per 24 hour   Intake 240 ml   Output --   Net 240 ml       Exam:   /77   Pulse 84   Temp 98.6 °F (37 °C) (Oral)   Resp 17   Ht 1.651 m (5' 5\")   Wt (!) 137.1 kg (302 lb 5 oz)   SpO2 95%   BMI 50.31 kg/m²  on room air  Gen: No distress.    Eyes: PERRL. No sclera icterus. No conjunctival injection.   ENT: No discharge. Pharynx clear.   Neck: Trachea midline. Normal thyroid.   Resp: No accessory muscle use. No crackles.  Scattered wheezes. No rhonchi. No dullness on percussion.  CV: Regular rate. Regular rhythm. No murmur or rub. No edema.   GI: Non-tender. Non-distended. No masses. No organomegaly. Normal bowel sounds. No hernia.   Skin: Warm and dry. No nodule on exposed extremities. No rash on exposed extremities.   Lymph: No cervical LAD. No supraclavicular LAD.   M/S: No cyanosis. No joint deformity. No clubbing.   Neuro: Awake. Moves all extremities. CN grossly intact.  Psych: Oriented x 3. No anxiety or agitation.     Scheduled Meds:   sodium chloride flush  5-40 mL IntraVENous 2 times per day    cefTRIAXone (ROCEPHIN) IV  1,000 mg IntraVENous Q24H    azithromycin  500 mg IntraVENous Q24H    methylPREDNISolone  40 mg IntraVENous Q12H    apixaban  5 mg Oral BID    cetirizine  10 mg Oral Daily    furosemide  20 mg Oral BID    gabapentin  100 mg Oral TID    potassium chloride  10 mEq Oral BID    levothyroxine  50 mcg Oral Daily    montelukast  10 mg Oral QAM    pantoprazole  40 mg Oral QAM AC    pravastatin  10 mg Oral Daily    insulin lispro  0-16 Units SubCUTAneous TID WC    insulin lispro  0-4 Units SubCUTAneous Nightly    ipratropium 0.5 mg-albuterol 2.5 mg  1 Dose Inhalation 4x Daily RT     Continuous Infusions:   sodium chloride 25 mL/hr at 09/02/24 1541    dextrose   
Awake sitting on the side of the bed. Morning meds given. No other needs noted at this time. Call light and bedside table within reach.   
Bedside Mobility Assessment Tool (BMAT):     Assessment Level 1- Sit and Shake    1. From a semi-reclined position, ask patient to sit up and rotate to a seated position at the side of the bed. Can use the bedrail.    2. Ask patient to reach out and grab your hand and shake making sure patient reaches across his/her midline.   Pass- Patient is able to come to a seated position, maintain core strength. Maintains seated balance while reaching across midline. Move on to Assessment Level 2.     Assessment Level 2- Stretch and Point   1. With patient in seated position at the side of the bed, have patient place both feet on the floor (or stool) with knees no higher than hips.    2. Ask patient to stretch one leg and straighten the knee, then bend the ankle/flex and point the toes. If appropriate, repeat with the other leg.   Pass- Patient is able to demonstrate appropriate quad strength on intended weight bearing limb(s). Move onto Assessment Level 3.     Assessment Level 3- Stand   1. Ask patient to elevate off the bed or chair (seated to standing) using an assistive device (cane, bedrail).    2. Patient should be able to raise buttocks off be and hold for a count of five. May repeat once.   Pass- Patient maintains standing stability for at least 5 seconds, proceed to assessment level 4.    Assessment Level 4- Walk   1. Ask patient to march in place at bedside.    2. Then ask patient to advance step and return each foot. Some medical conditions may render a patient from stepping backwards, use your best clinical judgement.   Pass- Patient demonstrates balance while shifting weight and ability to step, takes independent steps, does not use assistive device patient is MOBILITY LEVEL 4.      Mobility Level- 4    Patient is able to demonstrate the ability to move from a reclining position to an upright position within the recliner.    
Called consult @[phone] 9/1/2024 sowmya cheatham  
FSBS [phone], notified primary RN. Pau Campbell RN    
Fredy sent to XENIA Frazier to inform her of . Awaiting return response.  
IM Progress Note    Admit Date:  9/1/2024  1    Interval history:  44 y.o. female with PMH of COPD, DM, HTN, HLD, blood clots, thyroid disease, ARJUN, anxiety, and depression who [place] [facility] [place] of [place].     Subjective:  Ms. Cordero seen up in bed, recurrent admissions for asthma   Hx of PE in 3/24   Now with bronchitis and possible pna  Remains on RA    Objective:   /64   Pulse 87   Temp 97.6 °F (36.4 °C) (Oral)   Resp 16   Ht 1.651 m (5' 5\")   Wt (!) 137.1 kg (302 lb 5 oz)   SpO2 94%   BMI 50.31 kg/m²     Intake/Output Summary (Last 24 hours) at 9/2/2024 0745  Last data filed at 9/1/2024 2100  Gross per 24 hour   Intake 900 ml   Output --   Net 900 ml       Physical Exam:          General:  middle aged obese female, Awake, alert and oriented. Appears to be not in any distress  Mucous Membranes:  Pink , anicteric  Neck: No JVD, no carotid bruit, no thyromegaly  Chest:  Clear to auscultation bilaterally, right sided crackles and diminished   Cardiovascular:  RRR S1S2 heard, no murmurs or gallops  Abdomen:  Soft, undistended, non tender, no organomegaly, BS present  Extremities: No edema or cyanosis. Distal pulses well felt  Neurological : grossly normal      Medications:   Scheduled Medications:    insulin lispro  10 Units SubCUTAneous Once    sodium chloride flush  5-40 mL IntraVENous 2 times per day    cefTRIAXone (ROCEPHIN) IV  1,000 mg IntraVENous Q24H    azithromycin  500 mg IntraVENous Q24H    methylPREDNISolone  40 mg IntraVENous Q12H    apixaban  5 mg Oral BID    budesonide  500 mcg Nebulization BID    cetirizine  10 mg Oral Daily    furosemide  20 mg Oral BID    gabapentin  100 mg Oral TID    potassium chloride  10 mEq Oral BID    levothyroxine  50 mcg Oral Daily    montelukast  10 mg Oral QAM    pantoprazole  40 mg Oral QAM AC    pravastatin  10 mg Oral Daily    insulin lispro  0-16 Units SubCUTAneous TID WC    insulin lispro  0-4 Units SubCUTAneous Nightly    ipratropium 
IM Progress Note    Admit Date:  9/1/2024  2    Interval history:  44 y.o. female with PMH of COPD, DM, HTN, HLD, blood clots, thyroid disease, ARJUN, anxiety, and depression [place] [facility] [place] of [place]     Subjective:-  Ms. Cordero seen up in bed feels stable and ready to go home  recurrent admissions for asthma   Hx of PE in 3/24   Now with bronchitis and possible pna  Remains on RA    Objective:   /65   Pulse (!) 101   Temp 97.9 °F (36.6 °C) (Oral)   Resp 18   Ht 1.651 m (5' 5\")   Wt (!) 137.1 kg (302 lb 5 oz)   SpO2 94%   BMI 50.31 kg/m²     Intake/Output Summary (Last 24 hours) at 9/3/2024 1255  Last data filed at 9/3/2024 1245  Gross per 24 hour   Intake 725 ml   Output --   Net 725 ml       Physical Exam:      General:  middle aged obese female, Awake, alert and oriented. Appears to be not in any distress  Mucous Membranes:  Pink , anicteric  Neck: No JVD, no carotid bruit, no thyromegaly  Chest:  Clear to auscultation bilaterally, right sided crackles and improved than yesterday  Cardiovascular:  RRR S1S2 heard, no murmurs or gallops  Abdomen:  Soft, undistended, non tender, no organomegaly, BS present  Extremities: No edema or cyanosis. Distal pulses well felt  Neurological : grossly normal non focal       Medications:   Scheduled Medications:    ipratropium 0.5 mg-albuterol 2.5 mg  1 Dose Inhalation TID RT    predniSONE  40 mg Oral Daily    sodium chloride flush  5-40 mL IntraVENous 2 times per day    cefTRIAXone (ROCEPHIN) IV  1,000 mg IntraVENous Q24H    azithromycin  500 mg IntraVENous Q24H    apixaban  5 mg Oral BID    cetirizine  10 mg Oral Daily    furosemide  20 mg Oral BID    gabapentin  100 mg Oral TID    potassium chloride  10 mEq Oral BID    levothyroxine  50 mcg Oral Daily    montelukast  10 mg Oral QAM    pantoprazole  40 mg Oral QAM AC    pravastatin  10 mg Oral Daily    insulin lispro  0-16 Units SubCUTAneous TID WC    insulin lispro  0-4 Units 
Latest FS is 384, patient has Lantus 20 units tonight given once, 4 units of humalog given. Perfectserve Dr. Simmons to notify, ordered to give additional 10 units of lispro, explanation provided to patient, agreed. Given as ordered.  
MD notified of 352 BS.  
MD notified pt BS is 423.   
New orders in place for medium SSI orders per XENIA Frazier. Also, ordered 2u extra to give now. Pt is aware.  
O2 Sat at rest on room air is 96 %.  O2 Sat with activity on room air is 93 %.    
Patient admitted to room 231 from ED. Patient oriented to room, call light, bed rails, phone, lights and bathroom. Patient instructed about the schedule of the day including: vital sign frequency, lab draws, possible tests, frequency of MD and staff rounds, daily weights, I &O's and prescribed diet. Yes Telemetry box in place, patient aware of placement and reason. Bed locked, in lowest position, side rails up 2/4, call light within reach.        Recliner Assessment  Patient is able to demonstrate the ability to move from a reclining position to an upright position within the recliner.       4 Eyes Skin Assessment     NAME:  Pauline Cordero  YOB: 1980  MEDICAL RECORD NUMBER:  [id number]    The patient is being assessed for  Admission    I agree that at least one RN has performed a thorough Head to Toe Skin Assessment on the patient. ALL assessment sites listed below have been assessed.      Areas assessed by both nurses:    Head, Face, Ears, Shoulders, Back, Chest, Arms, Elbows, Hands, Sacrum. Buttock, Coccyx, Ischium, Legs. Feet and Heels, and Under Medical Devices      Redness to ble from above knees down. Pt reports it has been there since March. States it is cellulitis.       Does the Patient have a Wound? No noted wound(s)       Denis Prevention initiated by RN: No  Wound Care Orders initiated by RN: No    Pressure Injury (Stage 3,4, Unstageable, DTI, NWPT, and Complex wounds) if present, place Wound referral order by RN under : No    New Ostomies, if present place, Ostomy referral order under : No     Nurse 1 eSignature: Electronically signed by Madison Stock RN on 9/1/24 at 6:26 PM EDT    **SHARE this note so that the co-signing nurse can place an eSignature**    Nurse 2 eSignature: Electronically signed by Debbie Robison RN on 9/1/24 at 6:39 PM EDT   
Pt asked if she could eat her snacks. Pt was educated on the importance of maintaining a carb controlled diet and keeping her BS under control. Pt was reminded that her BS was 307 this morning.     PT also refused her scheduled 20mg of lasix, pt was educated on the medication and the importance of taking her medication as ordered.  
Pt awake in bed. FS (119). Perfect Serve sent. Orders placed for additional Insulin coverage, given. Patient continues to c/o of being hungry, sugar-free ONLY snacks given at this time. Patient educated again about Carb Control diet and snacks. Pt states understanding. PRN Tylenol given for headache. No other needs noted at this time. Call light and bedside table within reach.   
RT Inhaler-Nebulizer Bronchodilator Protocol Note    There is a bronchodilator order in the chart from a provider indicating to follow the RT Bronchodilator Protocol and there is an “Initiate RT Inhaler-Nebulizer Bronchodilator Protocol” order as well (see protocol at bottom of note).    CXR Findings:  XR CHEST PORTABLE    Result Date: 9/1/2024  1.  No radiographic evidence of an acute cardiopulmonary process. 2. Elevation of the right hemidiaphragm, as before.       The findings from the last RT Protocol Assessment were as follows:   History Pulmonary Disease: Chronic pulmonary disease  Respiratory Pattern: Dyspnea on exertion or RR 21-25 bpm  Breath Sounds: Slightly diminished and/or crackles  Cough: Strong, spontaneous, non-productive  Indication for Bronchodilator Therapy: Decreased or absent breath sounds  Bronchodilator Assessment Score: 6    Aerosolized bronchodilator medication orders have been revised according to the RT Inhaler-Nebulizer Bronchodilator Protocol below.    Respiratory Therapist to perform RT Therapy Protocol Assessment initially then follow the protocol.  Repeat RT Therapy Protocol Assessment PRN for score 0-3 or on second treatment, BID, and PRN for scores above 3.    No Indications - adjust the frequency to every 6 hours PRN wheezing or bronchospasm, if no treatments needed after 48 hours then discontinue using Per Protocol order mode.     If indication present, adjust the RT bronchodilator orders based on the Bronchodilator Assessment Score as indicated below.  Use Inhaler orders unless patient has one or more of the following: on home nebulizer, not able to hold breath for 10 seconds, is not alert and oriented, cannot activate and use MDI correctly, or respiratory rate 25 breaths per minute or more, then use the equivalent nebulizer order(s) with same Frequency and PRN reasons based on the score.  If a patient is on this medication at home then do not decrease Frequency below that used 
VSS. Skin wd. Resp ee unlabored. Discharge paperwork given.  Explanation of insulin and sliding scale explained. Verbalized understanding. Meds to beds provided and glucose machine.  Pulse ox at home. Aware of f/u apts. LDA removed. Patient left via w/c to private car with belongings. No s/s distress noted.   
at home.    0-3 - enter or revise RT bronchodilator order(s) to equivalent RT Bronchodilator order with Frequency of every 4 hours PRN for wheezing or increased work of breathing using Per Protocol order mode.        4-6 - enter or revise RT Bronchodilator order(s) to two equivalent RT bronchodilator orders with one order with BID Frequency and one order with Frequency of every 4 hours PRN wheezing or increased work of breathing using Per Protocol order mode.        7-10 - enter or revise RT Bronchodilator order(s) to two equivalent RT bronchodilator orders with one order with TID Frequency and one order with Frequency of every 4 hours PRN wheezing or increased work of breathing using Per Protocol order mode.       11-13 - enter or revise RT Bronchodilator order(s) to one equivalent RT bronchodilator order with QID Frequency and an Albuterol order with Frequency of every 4 hours PRN wheezing or increased work of breathing using Per Protocol order mode.      Greater than 13 - enter or revise RT Bronchodilator order(s) to one equivalent RT bronchodilator order with every 4 hours Frequency and an Albuterol order with Frequency of every 2 hours PRN wheezing or increased work of breathing using Per Protocol order mode.       Electronically signed by Larry Kumar RCP on 9/2/2024 at 7:16 PM

## 2024-09-03 NOTE — DISCHARGE SUMMARY
Name:  Pauline Cordero  Room:  0231/0231-02  MRN:    [id number]    Discharge Summary      This discharge summary is in conjunction with a complete physical exam done on the day of discharge.    Attending Physician: Dr. Wisdom  Discharging Physician: Dr. Doan      Admit: 9/1/2024  Discharge:  9/3/2024    HPI:    The patient is a 44 y.o. female with PMH of COPD, DM, HTN, HLD, blood clots, thyroid disease, ARJUN, anxiety, and depression who presented to [facility] [place] with complaint of pneumonia. Pt states she was dx with pneumonia on Friday and given doxycycline but has not felt any better despite the abx. She states that he was dx at an urgent care witht his and also possible right thigh cellulitis. She states that she has been increasingly SOB with productive cough. She states that she has had diarrhea and chills as well. She checked her O2 at home and it was low so she decided to come to the ED. She does not wear O2 at home. She is supposed to wear a CPAP but it got repossessed. She states she also has urinary frequency and is concerned she has a UTI. She denies any fever, abd pain, n/v, dysuria, or dizziness.      Diagnoses this Admission and Hospital Course        Acute Respiratory Failure w/Hypoxia   Sepsis 2/2 CAP, suspect a gram positive organism   Asthma/COPD AE  - Criteria: +HR, +RR, +LA, +WBC  - CT chest shows no PE and increasing airspace disease in the RLL posteriorly with bronchial wall thickening   - O2 sats 89% on RA, pt not on O2 at baseline   - tachypnea, dyspnea and hypoxia on adm  - given steroids, HHN and abx with improvement   - Rocephin and Zithromax D#3  - pulmonology consulted       Recurrent adm for asthma exacerbations noted  Hx of dysphagia seen GI and esophagogram was normal     Plan to dc with oral levaquin today, prednisone taper.     DM type 2 w/neuropathy   - on ozempic  need to start per pt  - A1c at 9.7. very high sugars here with steroids  - added lantus and humalog at dc  - plan to wean

## 2024-09-03 NOTE — ACP (ADVANCE CARE PLANNING)
Advance Care Planning     Advance Care Planning Inpatient Note  [facility] Department    Today's Date: 9/2/2024  Unit: [facility] [place] [facility]ICAL    Received request from HealthCare Provider.  Upon review of chart and communication with care team, patient's decision making abilities are not in question.. Patient and Significant Other  was/were present in the room during visit.    Goals of ACP Conversation:  Discuss advance care planning documents    Health Care Decision Makers:       Primary Decision Maker: Raphael Crum - Other - [phone]  Summary:  Verified Healthcare Decision Maker    Advance Care Planning Documents (Patient Wishes):  Pauline desires to review documents with Raphael, her significant other, and complete while she remains at [facility].      Assessment:  Paluine engaged in conversation. She inquired about HCPOA and Living Will documentation and desires to further review prior to completion.     Interventions:  Provided education on documents for clarity and greater understanding  Discussed and provided education on state decision maker hierarchy    Care Preferences Communicated:   No    Outcomes/Plan:  ACP Discussion: Postponed    Electronically signed by Chaplain LITO on 9/2/2024 at 5:02 PM           
(breathing machine).    Outcomes/Plan:  ACP Discussion: Completed  New advance directive completed.  Returned original document(s) to patient, as well as copies for distribution to appointed agents  Copy of advance directive given to staff to scan into medical record.  Teach Back Method used to verify the patient's and/or Healthcare Decision Maker's understanding of key information in the advance directive documents    Electronically signed by Chaplain Ivan on 9/3/2024 at 2:42 PM         Thank you for consulting [facility]    If you would like a 's presence for emotional, spiritual, grief or comfort care,   please dial \"[phone]\" and ask for the  on-call to be paged.    For help with Advanced Care Planning, Power of  for Healthcare or Living Will forms, you may also call us directly:    [phone]) Zaki  [phone]) Dede  [phone]) [facility]

## 2024-09-03 NOTE — DISCHARGE INSTR - DIET

## 2024-09-03 NOTE — PLAN OF CARE
Problem: Discharge Planning  Goal: Discharge to home or other facility with appropriate resources  Outcome: Progressing  Flowsheets (Taken 9/2/2024 2112)  Discharge to home or other facility with appropriate resources: Identify barriers to discharge with patient and caregiver     Problem: Safety - Adult  Goal: Free from fall injury  Outcome: Progressing     Problem: ABCDS Injury Assessment  Goal: Absence of physical injury  Outcome: Progressing     Problem: Pain  Goal: Verbalizes/displays adequate comfort level or baseline comfort level  Outcome: Progressing  Flowsheets (Taken 9/2/2024 2108)  Verbalizes/displays adequate comfort level or baseline comfort level:   Encourage patient to monitor pain and request assistance   Assess pain using appropriate pain scale     Problem: Skin/Tissue Integrity  Goal: Absence of new skin breakdown  Description: 1.  Monitor for areas of redness and/or skin breakdown  2.  Assess vascular access sites hourly  3.  Every 4-6 hours minimum:  Change oxygen saturation probe site  4.  Every 4-6 hours:  If on nasal continuous positive airway pressure, respiratory therapy assess nares and determine need for appliance change or resting period.  Outcome: Progressing     Problem: Respiratory - Adult  Goal: Achieves optimal ventilation and oxygenation  Outcome: Progressing  Flowsheets (Taken 9/2/2024 2112)  Achieves optimal ventilation and oxygenation: Assess for changes in respiratory status

## 2024-09-04 ENCOUNTER — TELEPHONE (OUTPATIENT)
Dept: PULMONOLOGY | Age: 44
End: 2024-09-04

## 2024-09-04 LAB
BACTERIA SPEC RESP CULT: NORMAL
GRAM STN SPEC: NORMAL

## 2024-09-09 ENCOUNTER — OFFICE VISIT (OUTPATIENT)
Dept: PULMONOLOGY | Age: 44
End: 2024-09-09
Payer: COMMERCIAL

## 2024-09-09 VITALS
RESPIRATION RATE: 17 BRPM | SYSTOLIC BLOOD PRESSURE: 118 MMHG | HEART RATE: 109 BPM | WEIGHT: 293 LBS | OXYGEN SATURATION: 96 % | HEIGHT: 66 IN | BODY MASS INDEX: 47.09 KG/M2 | DIASTOLIC BLOOD PRESSURE: 60 MMHG

## 2024-09-09 DIAGNOSIS — J45.909 UNCOMPLICATED ASTHMA, UNSPECIFIED ASTHMA SEVERITY, UNSPECIFIED WHETHER PERSISTENT: Primary | ICD-10-CM

## 2024-09-09 PROCEDURE — 3074F SYST BP LT 130 MM HG: CPT | Performed by: INTERNAL MEDICINE

## 2024-09-09 PROCEDURE — 99214 OFFICE O/P EST MOD 30 MIN: CPT | Performed by: INTERNAL MEDICINE

## 2024-09-09 PROCEDURE — 1111F DSCHRG MED/CURRENT MED MERGE: CPT | Performed by: INTERNAL MEDICINE

## 2024-09-09 PROCEDURE — 3078F DIAST BP <80 MM HG: CPT | Performed by: INTERNAL MEDICINE

## 2024-09-09 PROCEDURE — G8427 DOCREV CUR MEDS BY ELIG CLIN: HCPCS | Performed by: INTERNAL MEDICINE

## 2024-09-09 PROCEDURE — 4004F PT TOBACCO SCREEN RCVD TLK: CPT | Performed by: INTERNAL MEDICINE

## 2024-09-09 PROCEDURE — G8417 CALC BMI ABV UP PARAM F/U: HCPCS | Performed by: INTERNAL MEDICINE

## 2024-09-09 RX ORDER — AZITHROMYCIN 250 MG/1
250 TABLET, FILM COATED ORAL
Qty: 15 TABLET | Refills: 1 | Status: SHIPPED | OUTPATIENT
Start: 2024-09-09 | End: 2024-11-08

## 2024-09-24 ENCOUNTER — TELEPHONE (OUTPATIENT)
Dept: CARDIOLOGY CLINIC | Age: 44
End: 2024-09-24

## 2024-09-24 DIAGNOSIS — Z79.899 MEDICATION MANAGEMENT: Primary | ICD-10-CM

## 2024-09-24 DIAGNOSIS — Z79.899 MEDICATION MANAGEMENT: ICD-10-CM

## 2024-09-25 ENCOUNTER — HOSPITAL ENCOUNTER (OUTPATIENT)
Dept: CARDIAC REHAB | Age: 44
Setting detail: THERAPIES SERIES
Discharge: HOME | End: 2024-09-25
Attending: INTERNAL MEDICINE
Payer: COMMERCIAL

## 2024-09-25 ENCOUNTER — CLINICAL DOCUMENTATION (OUTPATIENT)
Dept: SPIRITUAL SERVICES | Age: 44
End: 2024-09-25

## 2024-09-25 VITALS — OXYGEN SATURATION: 95 % | BODY MASS INDEX: 47.09 KG/M2 | WEIGHT: 293 LBS | HEIGHT: 66 IN

## 2024-09-25 LAB
ANION GAP SERPL CALCULATED.3IONS-SCNC: 10 MMOL/L (ref 3–16)
BASOPHILS # BLD: 0.1 K/UL (ref 0–0.2)
BASOPHILS NFR BLD: 0.8 %
BUN SERPL-MCNC: 8 MG/DL (ref 7–20)
CALCIUM SERPL-MCNC: 9 MG/DL (ref 8.3–10.6)
CHLORIDE SERPL-SCNC: 103 MMOL/L (ref 99–110)
CO2 SERPL-SCNC: 26 MMOL/L (ref 21–32)
CREAT SERPL-MCNC: 0.7 MG/DL (ref 0.6–1.1)
DEPRECATED RDW RBC AUTO: 16.8 % (ref 12.4–15.4)
EOSINOPHIL # BLD: 0.6 K/UL (ref 0–0.6)
EOSINOPHIL NFR BLD: 6.5 %
GFR SERPLBLD CREATININE-BSD FMLA CKD-EPI: >90 ML/MIN/{1.73_M2}
GLUCOSE SERPL-MCNC: 150 MG/DL (ref 70–99)
HCT VFR BLD AUTO: 37.9 % (ref 36–48)
HGB BLD-MCNC: 12.2 G/DL (ref 12–16)
LYMPHOCYTES # BLD: 2.1 K/UL (ref 1–5.1)
LYMPHOCYTES NFR BLD: 24.8 %
MAGNESIUM SERPL-MCNC: 1.72 MG/DL (ref 1.8–2.4)
MCH RBC QN AUTO: 25.6 PG (ref 26–34)
MCHC RBC AUTO-ENTMCNC: 32.2 G/DL (ref 31–36)
MCV RBC AUTO: 79.5 FL (ref 80–100)
MONOCYTES # BLD: 0.5 K/UL (ref 0–1.3)
MONOCYTES NFR BLD: 5.3 %
NEUTROPHILS # BLD: 5.3 K/UL (ref 1.7–7.7)
NEUTROPHILS NFR BLD: 62.6 %
NT-PROBNP SERPL-MCNC: <36 PG/ML (ref 0–124)
PLATELET # BLD AUTO: 267 K/UL (ref 135–450)
PMV BLD AUTO: 8.8 FL (ref 5–10.5)
POTASSIUM SERPL-SCNC: 3.5 MMOL/L (ref 3.5–5.1)
RBC # BLD AUTO: 4.77 M/UL (ref 4–5.2)
SODIUM SERPL-SCNC: 139 MMOL/L (ref 136–145)
WBC # BLD AUTO: 8.5 K/UL (ref 4–11)

## 2024-09-25 PROCEDURE — 93798 PHYS/QHP OP CAR RHAB W/ECG: CPT

## 2024-09-25 ASSESSMENT — PATIENT HEALTH QUESTIONNAIRE - PHQ9
SUM OF ALL RESPONSES TO PHQ9 QUESTIONS 1 & 2: 5
SUM OF ALL RESPONSES TO PHQ QUESTIONS 1-9: 19
5. POOR APPETITE OR OVEREATING: SEVERAL DAYS
SUM OF ALL RESPONSES TO PHQ QUESTIONS 1-9: 19
6. FEELING BAD ABOUT YOURSELF - OR THAT YOU ARE A FAILURE OR HAVE LET YOURSELF OR YOUR FAMILY DOWN: NEARLY EVERY DAY
9. THOUGHTS THAT YOU WOULD BE BETTER OFF DEAD, OR OF HURTING YOURSELF: MORE THAN HALF THE DAYS
1. LITTLE INTEREST OR PLEASURE IN DOING THINGS: MORE THAN HALF THE DAYS
8. MOVING OR SPEAKING SO SLOWLY THAT OTHER PEOPLE COULD HAVE NOTICED. OR THE OPPOSITE, BEING SO FIGETY OR RESTLESS THAT YOU HAVE BEEN MOVING AROUND A LOT MORE THAN USUAL: SEVERAL DAYS
7. TROUBLE CONCENTRATING ON THINGS, SUCH AS READING THE NEWSPAPER OR WATCHING TELEVISION: MORE THAN HALF THE DAYS
4. FEELING TIRED OR HAVING LITTLE ENERGY: NEARLY EVERY DAY
SUM OF ALL RESPONSES TO PHQ QUESTIONS 1-9: 17
10. IF YOU CHECKED OFF ANY PROBLEMS, HOW DIFFICULT HAVE THESE PROBLEMS MADE IT FOR YOU TO DO YOUR WORK, TAKE CARE OF THINGS AT HOME, OR GET ALONG WITH OTHER PEOPLE: SOMEWHAT DIFFICULT
2. FEELING DOWN, DEPRESSED OR HOPELESS: NEARLY EVERY DAY
3. TROUBLE FALLING OR STAYING ASLEEP: MORE THAN HALF THE DAYS
SUM OF ALL RESPONSES TO PHQ QUESTIONS 1-9: 19

## 2024-09-25 ASSESSMENT — LIFESTYLE VARIABLES
SMOKELESS_TOBACCO: NO
ALCOHOL_USE: NO

## 2024-09-25 ASSESSMENT — PULMONARY FUNCTION TESTS
FVC (LITERS): 70
FEV1 (%PREDICTED): 72

## 2024-09-26 ENCOUNTER — TELEPHONE (OUTPATIENT)
Dept: CARDIOLOGY CLINIC | Age: 44
End: 2024-09-26

## 2024-09-30 NOTE — PROGRESS NOTES
node)  -cannot tolerate compression hose  -recurrent cellulitis per pt:  -? Lymphedema (defer to PCP)  -follow up echo  -low sodium diet    6. Hx of pulmonary embolus  -occurred in March 2024  -last CTPA showed residual small PE  -remains on Eliquis  -? Chronic cor pulmonale    7. H/O deep venous thrombosis  -3/3024: noted in R proximal vein of RLE  -on Eliquis  - per pt recent venous scan shows no thrombus (9/2024)    8. Nicotine abuse  -may be contributing to her tachycardia  -recommend decrease in nicotine gum  -prior smoker    9. Type II diabetes mellitus  -now on insulin    Plan:  Metolazone 5 mg x 1 and take 20 mEq KCL extra  Continue Eliquis, lasix, statin, KCL  Echo to be completed this weekend (? R heart function)  Discussed low-fat/low sodium diet, monitoring of daily weights, fluid restriction and the importance of regular exercise and activity.  Weight loss and increased activity encouraged  Follow up with [facility] in 4 months or sooner if needed    Thanks for allowing me to participate in the care of this patient.    Diane Enzweiler, APRN-CNP  [facility], [address]  [place] [address]  Office: [phone]  Fax: [phone]      Electronically signed by DIANE M ENZWEILER, APRN - CNP on 10/2/2024 at 3:04 PM

## 2024-10-02 ENCOUNTER — OFFICE VISIT (OUTPATIENT)
Dept: CARDIOLOGY CLINIC | Age: 44
End: 2024-10-02

## 2024-10-02 VITALS
WEIGHT: 293 LBS | OXYGEN SATURATION: 96 % | HEIGHT: 66 IN | DIASTOLIC BLOOD PRESSURE: 62 MMHG | BODY MASS INDEX: 47.09 KG/M2 | HEART RATE: 121 BPM | SYSTOLIC BLOOD PRESSURE: 146 MMHG

## 2024-10-02 DIAGNOSIS — G47.33 OSA (OBSTRUCTIVE SLEEP APNEA): ICD-10-CM

## 2024-10-02 DIAGNOSIS — R06.02 SOB (SHORTNESS OF BREATH): Primary | ICD-10-CM

## 2024-10-02 DIAGNOSIS — Z86.711 HX OF PULMONARY EMBOLUS: ICD-10-CM

## 2024-10-02 DIAGNOSIS — R00.2 PALPITATION: ICD-10-CM

## 2024-10-02 DIAGNOSIS — R00.0 SINUS TACHYCARDIA: ICD-10-CM

## 2024-10-02 DIAGNOSIS — R60.0 BILATERAL EDEMA OF LOWER EXTREMITY: ICD-10-CM

## 2024-10-02 RX ORDER — METOLAZONE 10 MG/1
10 TABLET ORAL
COMMUNITY
Start: 2024-09-25

## 2024-10-02 NOTE — PATIENT INSTRUCTIONS
Day you take metolazone, take extra potassium (take 4 tabs total instead of 2 tabs)    Continue other medications

## 2024-10-07 ENCOUNTER — TELEPHONE (OUTPATIENT)
Dept: CARDIAC REHAB | Age: 44
End: 2024-10-07

## 2024-10-07 NOTE — TELEPHONE ENCOUNTER
Voicemail received from patient requesting department call back to schedule pulmonary rehab.    Initiate Treatment: Timolol maleate 0.25 % eye drops - Apply one drop to the affected area of the nose until healed.

## 2024-10-08 ENCOUNTER — OFFICE VISIT (OUTPATIENT)
Dept: URGENT CARE | Age: 44
End: 2024-10-08

## 2024-10-08 VITALS
BODY MASS INDEX: 53.92 KG/M2 | SYSTOLIC BLOOD PRESSURE: 117 MMHG | HEIGHT: 62 IN | HEART RATE: 100 BPM | WEIGHT: 293 LBS | TEMPERATURE: 98.1 F | OXYGEN SATURATION: 96 % | DIASTOLIC BLOOD PRESSURE: 81 MMHG

## 2024-10-08 DIAGNOSIS — M62.838 MUSCLE SPASM: ICD-10-CM

## 2024-10-08 DIAGNOSIS — M54.16 LUMBAR NERVE ROOT IMPINGEMENT: ICD-10-CM

## 2024-10-08 DIAGNOSIS — M54.50 ACUTE RIGHT-SIDED LOW BACK PAIN WITHOUT SCIATICA: Primary | ICD-10-CM

## 2024-10-08 RX ORDER — PREDNISONE 20 MG/1
20 TABLET ORAL 2 TIMES DAILY
Qty: 10 TABLET | Refills: 0 | Status: SHIPPED | OUTPATIENT
Start: 2024-10-08 | End: 2024-10-13

## 2024-10-08 ASSESSMENT — VISUAL ACUITY: OU: 1

## 2024-10-08 NOTE — PROGRESS NOTES
the legs, radiation of pain, numbness/tingling over pelvic area, and denies bowel/bladder incontinence/withholding.     Sitting down makes symptoms better.  Standing/walking makes symptoms worse.    Has attempted Tylenol, Robaxin, Neurontin, Lidocaine patches for symptoms all without any relief of the discomfort/pain.      History provided by:  Patient   used: No        VITAL SIGNS  Vitals:    10/08/24 1909 10/08/24 1917   BP: 123/84 117/81   Pulse: 100    Temp: 98.1 °F (36.7 °C)    SpO2: 96%    Weight: (!) 142 kg (313 lb)    Height: 1.575 m (5' 2\")        Review of Systems  See HPI for pertinent positives and negatives.    Physical Exam  Vitals reviewed.   Constitutional:       General: She is not in acute distress.     Appearance: Normal appearance. She is morbidly obese. She is not ill-appearing.   HENT:      Head: Normocephalic and atraumatic.      Right Ear: External ear normal.      Left Ear: External ear normal.      Nose: Nose normal.      Mouth/Throat:      Mouth: Mucous membranes are moist.   Eyes:      General: Vision grossly intact. Gaze aligned appropriately.      Extraocular Movements: Extraocular movements intact.      Conjunctiva/sclera: Conjunctivae normal.      Pupils: Pupils are equal, round, and reactive to light.   Cardiovascular:      Rate and Rhythm: Normal rate and regular rhythm.      Heart sounds: Normal heart sounds.   Pulmonary:      Effort: Pulmonary effort is normal.      Breath sounds: Normal breath sounds.   Abdominal:      General: There is no distension or abdominal bruit.      Palpations: Abdomen is soft. There is no mass or pulsatile mass.      Tenderness: There is no abdominal tenderness.   Musculoskeletal:      Cervical back: Normal, normal range of motion and neck supple.      Thoracic back: Normal.      Lumbar back: Spasms (noted within area of tenderness) and tenderness (see diagram for location) present. No swelling, deformity, lacerations or bony  There are no Wet Read(s) to document.

## 2024-10-09 NOTE — PATIENT INSTRUCTIONS
Prednisone prescribed for treatment of the inflammation/impingement  Watch your blood sugars while taking this medication, as your blood sugars will increase while taking this medication.  Muscle relaxer (Tizanidine) provided for noted muscle spasms on exam. Recommend taking them at night, and if staying at home as they can cause fatigue. Do not take them before driving or if working heavy machinery.  You have been prescribed medication, Tizanidine, that causes drowsiness. While this is not a problem under normal circumstances, when taken with alcohol or while driving or operating heavy machinery, this medicine could cause you to become too sleepy and/or hurt yourself or others. Therefore, it is very important that you DO NOT DRIVE, DRINK ALCOHOL, OR OPERATE HEAVY MACHINERY WHILE TAKING THE MEDICINE(S) LISTED ABOVE.  Do not take Robaxin while taking this medication.  Acetaminophen (tylenol) for pain management while taking steroids  Hot compresses over lower back for 15-20 minutes, with at least a 1 hour break in between applications, several times per day to help with pain.   Referral to Orthopedics provided for follow up for continued treatment of the chronic back symptoms.    If you develop numbness or loss of bowel or bladder function (can not hold your urine or stool) follow up immediately with the ER for further evaluation due to concerns for a serious spine complication.    New Prescriptions    PREDNISONE (DELTASONE) 20 MG TABLET    Take 1 tablet by mouth 2 times daily for 5 days    TIZANIDINE (ZANAFLEX) 4 MG TABLET    Take 1 tablet by mouth 3 times daily as needed (muscle spasms)

## 2024-10-16 ENCOUNTER — HOSPITAL ENCOUNTER (OUTPATIENT)
Dept: CARDIAC REHAB | Age: 44
Setting detail: THERAPIES SERIES
Discharge: HOME | End: 2024-10-16
Attending: INTERNAL MEDICINE

## 2024-10-16 ENCOUNTER — TELEPHONE (OUTPATIENT)
Dept: CARDIAC REHAB | Age: 44
End: 2024-10-16

## 2024-10-16 NOTE — TELEPHONE ENCOUNTER
Patient scheduled for pulmonary rehab today and did not show. Patient reports taking son to ED last night and didn't get home until late last night. Patient plans to start Pulmonary rehab on 10/21/24 and will attend Mon/Wed.

## 2024-10-21 ENCOUNTER — HOSPITAL ENCOUNTER (OUTPATIENT)
Dept: CARDIAC REHAB | Age: 44
Setting detail: THERAPIES SERIES
Discharge: HOME | End: 2024-10-21
Attending: INTERNAL MEDICINE
Payer: COMMERCIAL

## 2024-10-22 ENCOUNTER — OFFICE VISIT (OUTPATIENT)
Dept: ORTHOPEDIC SURGERY | Age: 44
End: 2024-10-22
Payer: COMMERCIAL

## 2024-10-22 VITALS — WEIGHT: 293 LBS | HEIGHT: 62 IN | BODY MASS INDEX: 53.92 KG/M2

## 2024-10-22 DIAGNOSIS — S39.012A STRAIN OF LUMBAR REGION, INITIAL ENCOUNTER: Primary | ICD-10-CM

## 2024-10-22 DIAGNOSIS — M54.50 PAIN, LUMBAR REGION: ICD-10-CM

## 2024-10-22 PROCEDURE — G8484 FLU IMMUNIZE NO ADMIN: HCPCS | Performed by: PHYSICIAN ASSISTANT

## 2024-10-22 PROCEDURE — G8427 DOCREV CUR MEDS BY ELIG CLIN: HCPCS | Performed by: PHYSICIAN ASSISTANT

## 2024-10-22 PROCEDURE — 99204 OFFICE O/P NEW MOD 45 MIN: CPT | Performed by: PHYSICIAN ASSISTANT

## 2024-10-22 PROCEDURE — 4004F PT TOBACCO SCREEN RCVD TLK: CPT | Performed by: PHYSICIAN ASSISTANT

## 2024-10-22 PROCEDURE — G8417 CALC BMI ABV UP PARAM F/U: HCPCS | Performed by: PHYSICIAN ASSISTANT

## 2024-10-22 RX ORDER — CYCLOBENZAPRINE HCL 10 MG
10 TABLET ORAL 3 TIMES DAILY PRN
Qty: 30 TABLET | Refills: 0 | Status: SHIPPED | OUTPATIENT
Start: 2024-10-22 | End: 2024-11-01

## 2024-10-23 ENCOUNTER — HOSPITAL ENCOUNTER (OUTPATIENT)
Dept: CARDIAC REHAB | Age: 44
Setting detail: THERAPIES SERIES
Discharge: HOME | End: 2024-10-23
Attending: INTERNAL MEDICINE
Payer: COMMERCIAL

## 2024-10-23 VITALS — BODY MASS INDEX: 57.25 KG/M2 | WEIGHT: 293 LBS

## 2024-10-23 PROCEDURE — 94625 PHY/QHP OP PULM RHB W/O MNTR: CPT

## 2024-10-28 ENCOUNTER — HOSPITAL ENCOUNTER (OUTPATIENT)
Dept: CARDIAC REHAB | Age: 44
Setting detail: THERAPIES SERIES
Discharge: HOME | End: 2024-10-28
Attending: INTERNAL MEDICINE
Payer: COMMERCIAL

## 2024-10-28 PROCEDURE — G0239 OTH RESP PROC, GROUP: HCPCS

## 2024-10-28 PROCEDURE — 94625 PHY/QHP OP PULM RHB W/O MNTR: CPT

## 2024-10-29 ENCOUNTER — HOSPITAL ENCOUNTER (OUTPATIENT)
Dept: PHYSICAL THERAPY | Age: 44
Setting detail: THERAPIES SERIES
Discharge: HOME | End: 2024-10-29
Payer: COMMERCIAL

## 2024-10-29 DIAGNOSIS — M54.50 ACUTE LOW BACK PAIN WITHOUT SCIATICA, UNSPECIFIED BACK PAIN LATERALITY: ICD-10-CM

## 2024-10-29 DIAGNOSIS — S39.012D BACK STRAIN, SUBSEQUENT ENCOUNTER: Primary | ICD-10-CM

## 2024-10-29 PROCEDURE — 97161 PT EVAL LOW COMPLEX 20 MIN: CPT

## 2024-10-29 PROCEDURE — 97112 NEUROMUSCULAR REEDUCATION: CPT

## 2024-10-29 NOTE — PLAN OF CARE
[facility]- Outpatient Rehabilitation and Therapy [address] [place] [address] [place] [address] office: [phone] fax: [phone]     Physical Therapy Initial Evaluation Certification      Dear Watson Wilkerson MD ,    We had the pleasure of evaluating the following patient for physical therapy services at [facility].  A summary of our findings can be found in the initial assessment below.  This includes our plan of care.  If you have any questions or concerns regarding these findings, please do not hesitate to contact me at the office phone number listed above.  Thank you for the referral.     Physician Signature:_______________________________Date:__________________  By signing above (or electronic signature), therapist’s plan is approved by physician       Physical Therapy: TREATMENT/PROGRESS NOTE   Patient: Pauline Cordero (44 y.o. female)   Examination Date: 10/29/2024   :  1980 MRN: [id number]   Visit #: 1   Insurance Allowable Auth Needed   30 [x]Yes    []No    Insurance: Payor: [employer] / Plan: [employer] [place] MEDICAID / Product Type: *No Product type* /   Insurance ID: [id number] - (Medicaid Managed)  Secondary Insurance (if applicable):    Treatment Diagnosis:     ICD-10-CM    1. Back strain, subsequent encounter  S39.012D       2. Acute low back pain without sciatica, unspecified back pain laterality  M54.50          Medical Diagnosis:  Strain of lumbar region, initial encounter [S39.012A]   Referring Physician: Watson Wilkerson MD  PCP: Orion Miles MD     Plan of care signed (Y/N):     Date of Patient follow up with Physician:      Plan of Care Report: EVAL today  POC update due: (10 visits /OR AUTH LIMITS, whichever is less)   PN due 2024 or 10 visits  POC due in 24 visits or 90 days Recert                                             Medical History:  Comorbidities:  Diabetes (Type I or

## 2024-10-30 ENCOUNTER — HOSPITAL ENCOUNTER (OUTPATIENT)
Dept: CARDIAC REHAB | Age: 44
Setting detail: THERAPIES SERIES
Discharge: HOME | End: 2024-10-30
Attending: INTERNAL MEDICINE
Payer: COMMERCIAL

## 2024-10-30 VITALS — BODY MASS INDEX: 55.79 KG/M2 | WEIGHT: 293 LBS

## 2024-10-30 PROCEDURE — G0239 OTH RESP PROC, GROUP: HCPCS

## 2024-11-01 ENCOUNTER — HOSPITAL ENCOUNTER (OUTPATIENT)
Dept: PHYSICAL THERAPY | Age: 44
Setting detail: THERAPIES SERIES
Discharge: HOME | End: 2024-11-01
Payer: COMMERCIAL

## 2024-11-01 PROCEDURE — 97110 THERAPEUTIC EXERCISES: CPT

## 2024-11-01 PROCEDURE — 97112 NEUROMUSCULAR REEDUCATION: CPT

## 2024-11-01 PROCEDURE — 97140 MANUAL THERAPY 1/> REGIONS: CPT

## 2024-11-01 NOTE — FLOWSHEET NOTE
[facility]- Outpatient Rehabilitation and Therapy [address] [place] [address] [place] [address] office: [phone] fax: [phone]      Physical Therapy: TREATMENT/PROGRESS NOTE   Patient: Pauline Cordero (44 y.o. female)   Examination Date: 2024   :  1980 MRN: [id number]   Visit #: 2   Insurance Allowable Auth Needed   30 [x]Yes    []No    Insurance: Payor: [employer] / Plan: [employer] [place] MEDICAID / Product Type: *No Product type* /   Insurance ID: [id number] - (Medicaid Managed)  Secondary Insurance (if applicable):    Treatment Diagnosis:     ICD-10-CM    1. Back strain, subsequent encounter  S39.012D       2. Acute low back pain without sciatica, unspecified back pain laterality  M54.50          Medical Diagnosis:  Strain of lumbar region, initial encounter [S39.012A]   Referring Physician: Watson Wilkerson MD  PCP: Orion Miles MD     Plan of care signed (Y/N):     Date of Patient follow up with Physician:      Plan of Care Report: NO  POC update due: (10 visits /OR AUTH LIMITS, whichever is less)   PN due 2024 or 10 visits  POC due in 24 visits or 90 days Recert                                             Medical History:  Comorbidities:  Diabetes (Type I or II)  Hypertension  Osteoarthritis  Anxiety  Depression  Other: HLD, Asthma, COPD, Fibromyalgia  Relevant Medical History:                                          Precautions/ Contra-indications:           Latex allergy:  YES  Pacemaker:    NO  Contraindications for Manipulation: None  Date of Surgery:   Other:    Red Flags:  None    Suicide Screening:   The patient did not verbalize a primary behavioral concern, suicidal ideation, suicidal intent, or demonstrate suicidal behaviors.    Preferred Language for Healthcare:   [x] English       [] other:    SUBJECTIVE EXAMINATION     Patient stated complaint: Patient reports to the clinic this date with continued symptoms, denies any improvement. No issues with HEP or content

## 2024-11-04 ENCOUNTER — HOSPITAL ENCOUNTER (OUTPATIENT)
Dept: CARDIAC REHAB | Age: 44
Setting detail: THERAPIES SERIES
Discharge: HOME | End: 2024-11-04
Attending: INTERNAL MEDICINE
Payer: COMMERCIAL

## 2024-11-04 VITALS — BODY MASS INDEX: 56.52 KG/M2 | WEIGHT: 293 LBS

## 2024-11-04 PROCEDURE — 94625 PHY/QHP OP PULM RHB W/O MNTR: CPT

## 2024-11-05 ENCOUNTER — APPOINTMENT (OUTPATIENT)
Dept: PHYSICAL THERAPY | Age: 44
End: 2024-11-05
Payer: COMMERCIAL

## 2024-11-06 ENCOUNTER — HOSPITAL ENCOUNTER (OUTPATIENT)
Dept: CARDIAC REHAB | Age: 44
Setting detail: THERAPIES SERIES
Discharge: HOME | End: 2024-11-06
Attending: INTERNAL MEDICINE
Payer: COMMERCIAL

## 2024-11-06 ENCOUNTER — TELEPHONE (OUTPATIENT)
Dept: CARDIAC REHAB | Age: 44
End: 2024-11-06

## 2024-11-06 NOTE — TELEPHONE ENCOUNTER
Voicemail received from patient that she has a family emergency and can't come today. Pt would like to come on Friday and would like staff to call back.

## 2024-11-07 ENCOUNTER — APPOINTMENT (OUTPATIENT)
Dept: PHYSICAL THERAPY | Age: 44
End: 2024-11-07
Payer: COMMERCIAL

## 2024-11-09 ENCOUNTER — OFFICE VISIT (OUTPATIENT)
Dept: URGENT CARE | Age: 44
End: 2024-11-09

## 2024-11-09 VITALS
BODY MASS INDEX: 53.92 KG/M2 | HEART RATE: 86 BPM | OXYGEN SATURATION: 97 % | WEIGHT: 293 LBS | DIASTOLIC BLOOD PRESSURE: 75 MMHG | SYSTOLIC BLOOD PRESSURE: 110 MMHG | HEIGHT: 62 IN | TEMPERATURE: 98.3 F

## 2024-11-09 DIAGNOSIS — L25.5 CONTACT DERMATITIS DUE TO PLANTS, EXCEPT FOOD, UNSPECIFIED CONTACT DERMATITIS TYPE: Primary | ICD-10-CM

## 2024-11-09 PROBLEM — F41.1 ANXIETY STATE: Status: ACTIVE | Noted: 2024-11-09

## 2024-11-09 PROBLEM — R79.89 ABNORMAL THYROID BLOOD TEST: Status: ACTIVE | Noted: 2023-09-06

## 2024-11-09 PROBLEM — M15.0 PRIMARY GENERALIZED (OSTEO)ARTHRITIS: Status: ACTIVE | Noted: 2023-10-09

## 2024-11-09 PROBLEM — R21 RASH: Status: RESOLVED | Noted: 2024-11-09 | Resolved: 2024-11-09

## 2024-11-09 PROBLEM — H04.123 DRY EYES: Status: ACTIVE | Noted: 2023-12-19

## 2024-11-09 PROBLEM — B86 SCABIES: Status: RESOLVED | Noted: 2024-11-09 | Resolved: 2024-11-09

## 2024-11-09 PROBLEM — J15.9 PNEUMONIA DUE TO GRAM-POSITIVE BACTERIA: Status: RESOLVED | Noted: 2024-09-01 | Resolved: 2024-11-09

## 2024-11-09 PROBLEM — R92.8 ABNORMAL FINDING ON BREAST IMAGING: Status: ACTIVE | Noted: 2024-11-09

## 2024-11-09 PROBLEM — J18.9 CAP (COMMUNITY ACQUIRED PNEUMONIA): Status: RESOLVED | Noted: 2024-11-09 | Resolved: 2024-11-09

## 2024-11-09 PROBLEM — J02.9 ACUTE PHARYNGITIS: Status: RESOLVED | Noted: 2024-11-09 | Resolved: 2024-11-09

## 2024-11-09 PROBLEM — R20.2 PARESTHESIA OF BOTH LEGS: Status: ACTIVE | Noted: 2024-11-09

## 2024-11-09 PROBLEM — J40 BRONCHITIS: Status: RESOLVED | Noted: 2024-11-09 | Resolved: 2024-11-09

## 2024-11-09 PROBLEM — R78.81 BACTEREMIA: Status: RESOLVED | Noted: 2024-05-01 | Resolved: 2024-11-09

## 2024-11-09 PROBLEM — R60.9 EDEMA: Status: RESOLVED | Noted: 2024-11-09 | Resolved: 2024-11-09

## 2024-11-09 PROBLEM — R52 GENERALIZED PAIN: Status: RESOLVED | Noted: 2024-11-09 | Resolved: 2024-11-09

## 2024-11-09 PROBLEM — R93.89 ABNORMAL CXR: Status: ACTIVE | Noted: 2022-01-14

## 2024-11-09 PROBLEM — N94.6 DYSMENORRHEA: Status: RESOLVED | Noted: 2024-11-09 | Resolved: 2024-11-09

## 2024-11-09 PROBLEM — Z09 ENCOUNTER FOR FOLLOW-UP EXAMINATION AFTER COMPLETED TREATMENT FOR CONDITIONS OTHER THAN MALIGNANT NEOPLASM: Status: RESOLVED | Noted: 2024-05-08 | Resolved: 2024-11-09

## 2024-11-09 PROBLEM — G47.00 INSOMNIA: Status: ACTIVE | Noted: 2022-10-12

## 2024-11-09 PROBLEM — M35.00 SJOGREN'S SYNDROME WITHOUT EXTRAGLANDULAR INVOLVEMENT (HCC): Status: ACTIVE | Noted: 2023-10-09

## 2024-11-09 PROBLEM — F51.04 PSYCHOPHYSIOLOGICAL INSOMNIA: Status: ACTIVE | Noted: 2022-10-12

## 2024-11-09 PROBLEM — R78.81 STREPTOCOCCAL BACTEREMIA: Status: RESOLVED | Noted: 2024-05-01 | Resolved: 2024-11-09

## 2024-11-09 PROBLEM — Z86.711 HISTORY OF PULMONARY EMBOLISM: Status: ACTIVE | Noted: 2024-05-01

## 2024-11-09 PROBLEM — J45.909 UNSPECIFIED ASTHMA, UNCOMPLICATED: Status: ACTIVE | Noted: 2024-09-03

## 2024-11-09 PROBLEM — J38.2 VOCAL CORD NODULES: Status: RESOLVED | Noted: 2024-11-09 | Resolved: 2024-11-09

## 2024-11-09 PROBLEM — L03.115 CELLULITIS OF RIGHT LOWER LEG: Status: RESOLVED | Noted: 2024-05-01 | Resolved: 2024-11-09

## 2024-11-09 PROBLEM — R60.9 SWELLING: Status: RESOLVED | Noted: 2024-11-09 | Resolved: 2024-11-09

## 2024-11-09 PROBLEM — J44.9 CHRONIC OBSTRUCTIVE PULMONARY DISEASE (HCC): Status: ACTIVE | Noted: 2023-12-15

## 2024-11-09 PROBLEM — F33.1 MODERATE EPISODE OF RECURRENT MAJOR DEPRESSIVE DISORDER (HCC): Status: ACTIVE | Noted: 2023-06-15

## 2024-11-09 PROBLEM — R30.0 DYSURIA: Status: RESOLVED | Noted: 2024-01-01 | Resolved: 2024-11-09

## 2024-11-09 PROBLEM — J45.901 ASTHMA WITH EXACERBATION: Status: RESOLVED | Noted: 2024-11-09 | Resolved: 2024-11-09

## 2024-11-09 PROBLEM — J01.90 ACUTE SINUSITIS: Status: RESOLVED | Noted: 2024-11-09 | Resolved: 2024-11-09

## 2024-11-09 PROBLEM — M72.2 PLANTAR FASCIITIS: Status: ACTIVE | Noted: 2024-11-09

## 2024-11-09 PROBLEM — L74.519 PRIMARY FOCAL HYPERHIDROSIS: Status: ACTIVE | Noted: 2024-11-09

## 2024-11-09 PROBLEM — R63.5 ABNORMAL WEIGHT GAIN: Status: RESOLVED | Noted: 2024-11-09 | Resolved: 2024-11-09

## 2024-11-09 PROBLEM — M76.899 ENTHESOPATHY OF HIP REGION: Status: RESOLVED | Noted: 2024-11-09 | Resolved: 2024-11-09

## 2024-11-09 PROBLEM — L72.3 SEBACEOUS CYST: Status: RESOLVED | Noted: 2024-11-09 | Resolved: 2024-11-09

## 2024-11-09 PROBLEM — R87.610 ATYPICAL SQUAMOUS CELLS OF UNDETERMINED SIGNIFICANCE ON CYTOLOGIC SMEAR OF CERVIX (ASC-US): Status: ACTIVE | Noted: 2024-11-09

## 2024-11-09 PROBLEM — K80.20 SYMPTOMATIC CHOLELITHIASIS: Status: RESOLVED | Noted: 2020-10-19 | Resolved: 2024-11-09

## 2024-11-09 PROBLEM — E16.2 HYPOGLYCEMIA: Status: ACTIVE | Noted: 2024-11-09

## 2024-11-09 PROBLEM — M25.512 PAIN IN JOINT OF LEFT SHOULDER: Status: RESOLVED | Noted: 2022-03-11 | Resolved: 2024-11-09

## 2024-11-09 PROBLEM — N64.4 BREAST TENDERNESS IN FEMALE: Status: RESOLVED | Noted: 2024-11-09 | Resolved: 2024-11-09

## 2024-11-09 PROBLEM — R53.83 FATIGUE: Status: ACTIVE | Noted: 2022-10-12

## 2024-11-09 PROBLEM — M79.605 PAIN IN LEFT LEG: Status: ACTIVE | Noted: 2024-03-12

## 2024-11-09 PROBLEM — I82.4Y1 ACUTE EMBOLISM AND THROMBOSIS OF UNSPECIFIED DEEP VEINS OF RIGHT PROXIMAL LOWER EXTREMITY (HCC): Status: ACTIVE | Noted: 2024-11-09

## 2024-11-09 PROBLEM — E11.9 TYPE 2 DIABETES MELLITUS WITHOUT COMPLICATION (HCC): Status: ACTIVE | Noted: 2019-01-21

## 2024-11-09 PROBLEM — R06.83 SNORING: Status: ACTIVE | Noted: 2022-10-12

## 2024-11-09 PROBLEM — B95.5 STREPTOCOCCAL BACTEREMIA: Status: RESOLVED | Noted: 2024-05-01 | Resolved: 2024-11-09

## 2024-11-09 PROBLEM — B99.9: Status: ACTIVE | Noted: 2024-11-09

## 2024-11-09 PROBLEM — K21.00 GASTRO-ESOPHAGEAL REFLUX DISEASE WITH ESOPHAGITIS: Status: ACTIVE | Noted: 2024-11-09

## 2024-11-09 PROBLEM — J06.9 ACUTE UPPER RESPIRATORY INFECTION: Status: RESOLVED | Noted: 2024-11-09 | Resolved: 2024-11-09

## 2024-11-09 PROBLEM — E55.9 VITAMIN D DEFICIENCY: Status: RESOLVED | Noted: 2024-11-09 | Resolved: 2024-11-09

## 2024-11-09 PROBLEM — H65.90 NONSUPPURATIVE OTITIS MEDIA: Status: RESOLVED | Noted: 2024-11-09 | Resolved: 2024-11-09

## 2024-11-09 RX ORDER — VALACYCLOVIR HYDROCHLORIDE 1 G/1
1000 TABLET, FILM COATED ORAL 3 TIMES DAILY
COMMUNITY
Start: 2024-11-07 | End: 2024-11-14

## 2024-11-09 RX ORDER — MUPIROCIN 20 MG/G
OINTMENT TOPICAL
COMMUNITY
Start: 2024-11-07

## 2024-11-09 RX ORDER — PREDNISONE 10 MG/1
TABLET ORAL
Qty: 35 TABLET | Refills: 0 | Status: SHIPPED | OUTPATIENT
Start: 2024-11-09 | End: 2024-11-23

## 2024-11-09 ASSESSMENT — VISUAL ACUITY: OU: 1

## 2024-11-09 NOTE — PROGRESS NOTES
Pauline Cordero (: 1980) is a 44 y.o. female, Established patient, here for evaluation of the following chief complaint(s):  Herpes Zoster (Patient was seen at PCP office on Thursday + was diagnosed with possible shingles (?). However, rash is spread all over her body. Concerned about right eye. )      ASSESSMENT/PLAN:    ICD-10-CM    1. Contact dermatitis due to plants, except food, unspecified contact dermatitis type  L25.5 predniSONE (DELTASONE) 10 MG tablet          - Contact Dermatitis due to exposure to Non-food plants:  Exam and history consistent with poison ivy contact dermatitis  Low concern for urticaria, burns, cellulitis, folliculitis, and ringworm.  Given pt noting some shooting pains down the left arm starting at the lower neck, with rash down the arm, cannot rule out possible shingles outbreak along with a possible contact dermatitis, however, given pt on daily antiviral treatment, and with bilateral rash over arms, back, and face, with rash of similar visual characteristics, shingles is of lower concern.  Overall treatment plan, regardless of whether shingles is involved or not, would still treat in similar fashion with systemic steroids.  Eye discomfort limited to lower eyelid with itch and exam concerning for mild erythema and inflammation without any conjunctival injection, exudate, or tenderness - low concerns for ophthalmic pathology, and given presence of similarly appearing contact dermatitis rash over the chin and bilateral arms, have higher concern for eye symptoms from contact dermatitis of the lower eyelid.  Low concerns for bacterial or viral conjunctivitis, corneal abrasion, foreign bodies, acute glaucoma, facial, orbital, or cristin-orbital cellulitis.  Prednisone taper prescribed for systemic treatment  Recommend OTC oral antihistamines to help with itching   Also recommend cool compresses, or epsom salt soaked compresses  Can use emollient based creams/lotions (Eucerin,

## 2024-11-09 NOTE — PATIENT INSTRUCTIONS
Prednisone taper prescribed for systemic treatment - take as directed.  Can use an oral antihistamine to help with itching - Claritin, Zyrtec, or Allegra  Cool compresses, or epsom salt soaked compresses can also help.  Can use emollient based creams/lotions (Eucerin, Cetaphil, or Aquaphor) to help with skin healing.  Watch for signs of infection (such as swelling, increased tenderness, discharge, purulence, spreading red rash, heat), and return to the clinic or follow up with your PCP should any develop.    Follow up with your PCP within 7 days or, if unable, you can return to the clinic if symptoms persist beyond 7 days or if symptoms worsen.    New Prescriptions    PREDNISONE (DELTASONE) 10 MG TABLET    Take 2 tablets by mouth 2 times daily for 5 days, THEN 1 tablet 2 times daily for 5 days, THEN 1 tablet daily for 5 days.

## 2024-11-11 ENCOUNTER — APPOINTMENT (OUTPATIENT)
Dept: CARDIAC REHAB | Age: 44
End: 2024-11-11
Attending: INTERNAL MEDICINE
Payer: COMMERCIAL

## 2024-11-13 ENCOUNTER — APPOINTMENT (OUTPATIENT)
Dept: PHYSICAL THERAPY | Age: 44
End: 2024-11-13
Payer: COMMERCIAL

## 2024-11-13 ENCOUNTER — HOSPITAL ENCOUNTER (OUTPATIENT)
Dept: CARDIAC REHAB | Age: 44
Setting detail: THERAPIES SERIES
End: 2024-11-13
Attending: INTERNAL MEDICINE
Payer: COMMERCIAL

## 2024-11-17 DIAGNOSIS — J45.50 UNCOMPLICATED SEVERE PERSISTENT ASTHMA: ICD-10-CM

## 2024-11-18 RX ORDER — BUDESONIDE 180 UG/1
AEROSOL, POWDER RESPIRATORY (INHALATION)
Qty: 3 EACH | Refills: 2 | Status: SHIPPED | OUTPATIENT
Start: 2024-11-18

## 2024-11-19 ENCOUNTER — HOSPITAL ENCOUNTER (OUTPATIENT)
Dept: PHYSICAL THERAPY | Age: 44
Setting detail: THERAPIES SERIES
End: 2024-11-19
Payer: COMMERCIAL

## 2024-11-20 ENCOUNTER — APPOINTMENT (OUTPATIENT)
Dept: CARDIAC REHAB | Age: 44
End: 2024-11-20
Attending: INTERNAL MEDICINE
Payer: COMMERCIAL

## 2024-11-20 ENCOUNTER — HOSPITAL ENCOUNTER (OUTPATIENT)
Dept: CARDIAC REHAB | Age: 44
Setting detail: THERAPIES SERIES
Discharge: HOME | End: 2024-11-20
Attending: INTERNAL MEDICINE
Payer: COMMERCIAL

## 2024-11-20 VITALS — BODY MASS INDEX: 55.79 KG/M2 | WEIGHT: 293 LBS

## 2024-11-20 PROCEDURE — G0239 OTH RESP PROC, GROUP: HCPCS

## 2024-11-25 ENCOUNTER — HOSPITAL ENCOUNTER (OUTPATIENT)
Dept: PHYSICAL THERAPY | Age: 44
Setting detail: THERAPIES SERIES
End: 2024-11-25
Payer: COMMERCIAL

## 2024-11-25 ENCOUNTER — HOSPITAL ENCOUNTER (OUTPATIENT)
Dept: CARDIAC REHAB | Age: 44
Setting detail: THERAPIES SERIES
Discharge: HOME | End: 2024-11-25
Attending: INTERNAL MEDICINE
Payer: COMMERCIAL

## 2024-11-25 ENCOUNTER — APPOINTMENT (OUTPATIENT)
Dept: CARDIAC REHAB | Age: 44
End: 2024-11-25
Attending: INTERNAL MEDICINE
Payer: COMMERCIAL

## 2024-11-25 VITALS — BODY MASS INDEX: 56.52 KG/M2 | WEIGHT: 293 LBS

## 2024-11-25 PROCEDURE — G0239 OTH RESP PROC, GROUP: HCPCS

## 2024-11-27 ENCOUNTER — APPOINTMENT (OUTPATIENT)
Dept: PHYSICAL THERAPY | Age: 44
End: 2024-11-27
Payer: COMMERCIAL

## 2024-11-27 ENCOUNTER — TELEPHONE (OUTPATIENT)
Dept: CARDIAC REHAB | Age: 44
End: 2024-11-27

## 2024-11-27 ENCOUNTER — APPOINTMENT (OUTPATIENT)
Dept: CARDIAC REHAB | Age: 44
End: 2024-11-27
Attending: INTERNAL MEDICINE
Payer: COMMERCIAL

## 2024-11-27 ENCOUNTER — HOSPITAL ENCOUNTER (OUTPATIENT)
Dept: CARDIAC REHAB | Age: 44
Setting detail: THERAPIES SERIES
Discharge: HOME | End: 2024-11-27
Attending: INTERNAL MEDICINE
Payer: COMMERCIAL

## 2024-11-27 VITALS — WEIGHT: 293 LBS | BODY MASS INDEX: 56.15 KG/M2

## 2024-11-27 PROCEDURE — G0239 OTH RESP PROC, GROUP: HCPCS

## 2024-11-27 NOTE — TELEPHONE ENCOUNTER
Patient called and apologized for taking a phone call during session. Pt reports her dietician called and patient has missed her call twice and pt did not want to miss it again. Pt reports she needed to hear what dietician had to say.

## 2024-12-02 ENCOUNTER — HOSPITAL ENCOUNTER (OUTPATIENT)
Dept: CARDIAC REHAB | Age: 44
Setting detail: THERAPIES SERIES
Discharge: HOME | End: 2024-12-02
Attending: INTERNAL MEDICINE
Payer: COMMERCIAL

## 2024-12-02 VITALS — BODY MASS INDEX: 56.88 KG/M2 | WEIGHT: 293 LBS

## 2024-12-02 PROCEDURE — G0239 OTH RESP PROC, GROUP: HCPCS

## 2024-12-04 ENCOUNTER — TELEPHONE (OUTPATIENT)
Dept: CARDIAC REHAB | Age: 44
End: 2024-12-04

## 2024-12-04 ENCOUNTER — HOSPITAL ENCOUNTER (OUTPATIENT)
Dept: CARDIAC REHAB | Age: 44
Setting detail: THERAPIES SERIES
End: 2024-12-04
Attending: INTERNAL MEDICINE
Payer: COMMERCIAL

## 2024-12-04 NOTE — TELEPHONE ENCOUNTER
Voicemail received from patient she has a really bad migraine and she will not be at pulmonary rehab today.

## 2024-12-09 ENCOUNTER — HOSPITAL ENCOUNTER (OUTPATIENT)
Dept: CARDIAC REHAB | Age: 44
Setting detail: THERAPIES SERIES
Discharge: HOME | End: 2024-12-09
Attending: INTERNAL MEDICINE
Payer: COMMERCIAL

## 2024-12-09 VITALS — WEIGHT: 293 LBS | BODY MASS INDEX: 55.79 KG/M2

## 2024-12-09 LAB
GLUCOSE BLD-MCNC: 117 MG/DL (ref 70–99)
PERFORMED ON: ABNORMAL

## 2024-12-09 PROCEDURE — G0239 OTH RESP PROC, GROUP: HCPCS

## 2024-12-11 ENCOUNTER — HOSPITAL ENCOUNTER (OUTPATIENT)
Dept: CARDIAC REHAB | Age: 44
Setting detail: THERAPIES SERIES
Discharge: HOME | End: 2024-12-11
Attending: INTERNAL MEDICINE
Payer: COMMERCIAL

## 2024-12-16 ENCOUNTER — TELEPHONE (OUTPATIENT)
Dept: CARDIAC REHAB | Age: 44
End: 2024-12-16

## 2024-12-16 ENCOUNTER — HOSPITAL ENCOUNTER (OUTPATIENT)
Dept: CARDIAC REHAB | Age: 44
Setting detail: THERAPIES SERIES
End: 2024-12-16
Attending: INTERNAL MEDICINE
Payer: COMMERCIAL

## 2024-12-18 ENCOUNTER — OFFICE VISIT (OUTPATIENT)
Dept: URGENT CARE | Age: 44
End: 2024-12-18

## 2024-12-18 VITALS
WEIGHT: 293 LBS | DIASTOLIC BLOOD PRESSURE: 77 MMHG | BODY MASS INDEX: 53.92 KG/M2 | HEART RATE: 101 BPM | SYSTOLIC BLOOD PRESSURE: 110 MMHG | OXYGEN SATURATION: 96 % | HEIGHT: 62 IN | TEMPERATURE: 98.2 F

## 2024-12-18 DIAGNOSIS — J40 BRONCHITIS: ICD-10-CM

## 2024-12-18 DIAGNOSIS — J01.10 ACUTE NON-RECURRENT FRONTAL SINUSITIS: ICD-10-CM

## 2024-12-18 DIAGNOSIS — R05.1 ACUTE COUGH: Primary | ICD-10-CM

## 2024-12-18 LAB
Lab: NORMAL
PERFORMING INSTRUMENT: NORMAL
QC PASS/FAIL: NORMAL
SARS-COV-2, POC: NORMAL

## 2024-12-18 RX ORDER — FLUCONAZOLE 150 MG/1
150 TABLET ORAL ONCE
Qty: 1 TABLET | Refills: 0 | Status: SHIPPED | OUTPATIENT
Start: 2024-12-18 | End: 2024-12-18

## 2024-12-18 RX ORDER — METHYLPREDNISOLONE 4 MG/1
TABLET ORAL
Qty: 1 KIT | Refills: 0 | Status: SHIPPED | OUTPATIENT
Start: 2024-12-18 | End: 2024-12-24

## 2024-12-18 RX ORDER — DEXTROMETHORPHAN HYDROBROMIDE AND PROMETHAZINE HYDROCHLORIDE 15; 6.25 MG/5ML; MG/5ML
5 SYRUP ORAL 4 TIMES DAILY PRN
Qty: 120 ML | Refills: 0 | Status: SHIPPED | OUTPATIENT
Start: 2024-12-18 | End: 2024-12-25

## 2024-12-18 RX ORDER — DOXYCYCLINE HYCLATE 100 MG
100 TABLET ORAL 2 TIMES DAILY
Qty: 14 TABLET | Refills: 0 | Status: SHIPPED | OUTPATIENT
Start: 2024-12-18 | End: 2024-12-25

## 2024-12-18 ASSESSMENT — ENCOUNTER SYMPTOMS
DIARRHEA: 0
NAUSEA: 0
EYE PAIN: 0
SHORTNESS OF BREATH: 0
EYE DISCHARGE: 0
VOMITING: 0
EYE REDNESS: 0
SORE THROAT: 1
COUGH: 1
ABDOMINAL PAIN: 0

## 2024-12-18 NOTE — PROGRESS NOTES
Pharynx: Oropharynx is clear. Uvula midline. No pharyngeal swelling, oropharyngeal exudate, posterior oropharyngeal erythema, uvula swelling or postnasal drip.      Tonsils: No tonsillar exudate or tonsillar abscesses.   Eyes:      Conjunctiva/sclera: Conjunctivae normal.      Pupils: Pupils are equal, round, and reactive to light.   Cardiovascular:      Rate and Rhythm: Regular rhythm. Tachycardia present.      Pulses: Normal pulses.      Heart sounds: Normal heart sounds.   Pulmonary:      Effort: Pulmonary effort is normal. No tachypnea, bradypnea, accessory muscle usage, prolonged expiration, respiratory distress or retractions. She is not intubated.      Breath sounds: Normal air entry. No stridor, decreased air movement or transmitted upper airway sounds. Examination of the right-upper field reveals decreased breath sounds. Examination of the left-upper field reveals decreased breath sounds. Decreased breath sounds present. No wheezing, rhonchi or rales.   Chest:      Chest wall: No tenderness.   Musculoskeletal:      Cervical back: Normal range of motion and neck supple.   Lymphadenopathy:      Cervical: No cervical adenopathy.   Skin:     General: Skin is warm and dry.   Neurological:      General: No focal deficit present.      Mental Status: She is alert and oriented to person, place, and time.   Psychiatric:         Mood and Affect: Mood normal.         Behavior: Behavior normal.         Thought Content: Thought content normal.         Judgment: Judgment normal.       PROCEDURES:  Unless otherwise noted below, none     Procedures    RESULTS:  Results for orders placed or performed in visit on 12/18/24   POCT COVID-19, Antigen   Result Value Ref Range    SARS-COV-2, POC Not-Detected Not Detected    Lot Number [id number]     QC Pass/Fail PASS     Performing Instrument BinaxNOW      An electronic signature was used to authenticate this note.    --Caryn Roberto PA-C

## 2024-12-23 ENCOUNTER — HOSPITAL ENCOUNTER (OUTPATIENT)
Dept: CARDIAC REHAB | Age: 44
Setting detail: THERAPIES SERIES
Discharge: HOME | End: 2024-12-23
Attending: INTERNAL MEDICINE
Payer: COMMERCIAL

## 2024-12-29 ENCOUNTER — OFFICE VISIT (OUTPATIENT)
Dept: URGENT CARE | Age: 44
End: 2024-12-29

## 2024-12-29 VITALS
WEIGHT: 293 LBS | TEMPERATURE: 98.1 F | SYSTOLIC BLOOD PRESSURE: 104 MMHG | HEART RATE: 115 BPM | HEIGHT: 65 IN | OXYGEN SATURATION: 92 % | DIASTOLIC BLOOD PRESSURE: 73 MMHG | BODY MASS INDEX: 48.82 KG/M2

## 2024-12-29 DIAGNOSIS — J40 BRONCHITIS: ICD-10-CM

## 2024-12-29 DIAGNOSIS — J01.10 ACUTE NON-RECURRENT FRONTAL SINUSITIS: ICD-10-CM

## 2024-12-29 DIAGNOSIS — R05.1 ACUTE COUGH: Primary | ICD-10-CM

## 2024-12-29 LAB
INFLUENZA VIRUS A RNA: NEGATIVE
INFLUENZA VIRUS B RNA: NEGATIVE
Lab: NORMAL
PERFORMING INSTRUMENT: NORMAL
QC PASS/FAIL: NORMAL
SARS-COV-2, POC: NORMAL

## 2024-12-29 RX ORDER — DESVENLAFAXINE 25 MG/1
25 TABLET, EXTENDED RELEASE ORAL DAILY
COMMUNITY
Start: 2024-12-04 | End: 2025-06-02

## 2024-12-29 RX ORDER — PREDNISONE 10 MG/1
5 TABLET ORAL 2 TIMES DAILY
Qty: 5 TABLET | Refills: 0 | Status: SHIPPED | OUTPATIENT
Start: 2024-12-29 | End: 2025-01-03

## 2024-12-29 RX ORDER — TRIAMCINOLONE ACETONIDE 1 MG/G
CREAM TOPICAL
COMMUNITY
Start: 2024-10-23

## 2024-12-29 RX ORDER — FLUCONAZOLE 150 MG/1
TABLET ORAL
COMMUNITY
Start: 2024-12-18

## 2024-12-29 RX ORDER — PANTOPRAZOLE SODIUM 40 MG/1
TABLET, DELAYED RELEASE ORAL
COMMUNITY
Start: 2024-12-16

## 2024-12-29 RX ORDER — FLUCONAZOLE 150 MG/1
150 TABLET ORAL ONCE
Qty: 1 TABLET | Refills: 1 | Status: SHIPPED | OUTPATIENT
Start: 2024-12-29 | End: 2024-12-29

## 2024-12-29 RX ORDER — KETOROLAC TROMETHAMINE 30 MG/ML
INJECTION, SOLUTION INTRAMUSCULAR; INTRAVENOUS
COMMUNITY
Start: 2024-11-04

## 2024-12-29 RX ORDER — EMPAGLIFLOZIN 10 MG/1
10 TABLET, FILM COATED ORAL DAILY
COMMUNITY
Start: 2024-11-18

## 2024-12-29 RX ORDER — DOXYCYCLINE HYCLATE 100 MG
100 TABLET ORAL 2 TIMES DAILY
Qty: 14 TABLET | Refills: 0 | Status: SHIPPED | OUTPATIENT
Start: 2024-12-29 | End: 2025-01-05

## 2024-12-29 RX ORDER — SUCRALFATE ORAL 1 G/10ML
1 SUSPENSION ORAL EVERY 6 HOURS
COMMUNITY
Start: 2024-05-24

## 2024-12-29 RX ORDER — PREDNISONE 10 MG/1
10 TABLET ORAL 2 TIMES DAILY
Qty: 10 TABLET | Refills: 0 | Status: SHIPPED | OUTPATIENT
Start: 2024-12-29 | End: 2025-01-03

## 2024-12-29 RX ORDER — PREDNISONE 20 MG/1
20 TABLET ORAL 2 TIMES DAILY
Qty: 10 TABLET | Refills: 0 | Status: SHIPPED | OUTPATIENT
Start: 2024-12-29 | End: 2025-01-03

## 2024-12-30 ENCOUNTER — APPOINTMENT (OUTPATIENT)
Dept: GENERAL RADIOLOGY | Age: 44
End: 2024-12-30
Payer: COMMERCIAL

## 2024-12-30 ENCOUNTER — HOSPITAL ENCOUNTER (EMERGENCY)
Age: 44
Discharge: HOME | End: 2024-12-30
Payer: COMMERCIAL

## 2024-12-30 ENCOUNTER — HOSPITAL ENCOUNTER (OUTPATIENT)
Dept: CARDIAC REHAB | Age: 44
Setting detail: THERAPIES SERIES
End: 2024-12-30
Attending: INTERNAL MEDICINE
Payer: COMMERCIAL

## 2024-12-30 VITALS
HEART RATE: 105 BPM | DIASTOLIC BLOOD PRESSURE: 81 MMHG | RESPIRATION RATE: 18 BRPM | BODY MASS INDEX: 48.82 KG/M2 | TEMPERATURE: 97.9 F | SYSTOLIC BLOOD PRESSURE: 114 MMHG | HEIGHT: 65 IN | WEIGHT: 293 LBS | OXYGEN SATURATION: 97 %

## 2024-12-30 DIAGNOSIS — J40 BRONCHITIS: Primary | ICD-10-CM

## 2024-12-30 LAB
ALBUMIN SERPL-MCNC: 3.7 G/DL (ref 3.4–5)
ALBUMIN/GLOB SERPL: 1.2 {RATIO} (ref 1.1–2.2)
ALP SERPL-CCNC: 104 U/L (ref 40–129)
ALT SERPL-CCNC: 18 U/L (ref 10–40)
ANION GAP SERPL CALCULATED.3IONS-SCNC: 10 MMOL/L (ref 3–16)
AST SERPL-CCNC: 16 U/L (ref 15–37)
BACTERIA URNS QL MICRO: ABNORMAL /HPF
BASOPHILS # BLD: 0.1 K/UL (ref 0–0.2)
BASOPHILS NFR BLD: 0.6 %
BILIRUB SERPL-MCNC: <0.2 MG/DL (ref 0–1)
BILIRUB UR QL STRIP.AUTO: NEGATIVE
BUN SERPL-MCNC: 17 MG/DL (ref 7–20)
CALCIUM SERPL-MCNC: 9.2 MG/DL (ref 8.3–10.6)
CHLORIDE SERPL-SCNC: 102 MMOL/L (ref 99–110)
CLARITY UR: CLEAR
CO2 SERPL-SCNC: 24 MMOL/L (ref 21–32)
COLOR UR: YELLOW
CREAT SERPL-MCNC: 0.7 MG/DL (ref 0.6–1.1)
DEPRECATED RDW RBC AUTO: 15.4 % (ref 12.4–15.4)
EOSINOPHIL # BLD: 0 K/UL (ref 0–0.6)
EOSINOPHIL NFR BLD: 0.1 %
EPI CELLS #/AREA URNS HPF: ABNORMAL /HPF (ref 0–5)
GFR SERPLBLD CREATININE-BSD FMLA CKD-EPI: >90 ML/MIN/{1.73_M2}
GLUCOSE SERPL-MCNC: 206 MG/DL (ref 70–99)
GLUCOSE UR STRIP.AUTO-MCNC: 100 MG/DL
HCT VFR BLD AUTO: 42.1 % (ref 36–48)
HGB BLD-MCNC: 13.6 G/DL (ref 12–16)
HGB UR QL STRIP.AUTO: ABNORMAL
KETONES UR STRIP.AUTO-MCNC: NEGATIVE MG/DL
LACTATE BLDV-SCNC: 1.4 MMOL/L (ref 0.4–1.9)
LEUKOCYTE ESTERASE UR QL STRIP.AUTO: NEGATIVE
LYMPHOCYTES # BLD: 1.8 K/UL (ref 1–5.1)
LYMPHOCYTES NFR BLD: 16.9 %
MCH RBC QN AUTO: 25 PG (ref 26–34)
MCHC RBC AUTO-ENTMCNC: 32.3 G/DL (ref 31–36)
MCV RBC AUTO: 77.4 FL (ref 80–100)
MONOCYTES # BLD: 0.8 K/UL (ref 0–1.3)
MONOCYTES NFR BLD: 7.8 %
MUCOUS THREADS #/AREA URNS LPF: ABNORMAL /LPF
NEUTROPHILS # BLD: 7.9 K/UL (ref 1.7–7.7)
NEUTROPHILS NFR BLD: 74.6 %
NITRITE UR QL STRIP.AUTO: NEGATIVE
PH UR STRIP.AUTO: 6 [PH] (ref 5–8)
PLATELET # BLD AUTO: 264 K/UL (ref 135–450)
PMV BLD AUTO: 8.3 FL (ref 5–10.5)
POTASSIUM SERPL-SCNC: 3.9 MMOL/L (ref 3.5–5.1)
PROT SERPL-MCNC: 6.8 G/DL (ref 6.4–8.2)
PROT UR STRIP.AUTO-MCNC: NEGATIVE MG/DL
RBC # BLD AUTO: 5.44 M/UL (ref 4–5.2)
RBC #/AREA URNS HPF: ABNORMAL /HPF (ref 0–4)
SODIUM SERPL-SCNC: 136 MMOL/L (ref 136–145)
SP GR UR STRIP.AUTO: >=1.03 (ref 1–1.03)
UA COMPLETE W REFLEX CULTURE PNL UR: ABNORMAL
UA DIPSTICK W REFLEX MICRO PNL UR: YES
URN SPEC COLLECT METH UR: ABNORMAL
UROBILINOGEN UR STRIP-ACNC: 0.2 E.U./DL
WBC # BLD AUTO: 10.5 K/UL (ref 4–11)
WBC #/AREA URNS HPF: ABNORMAL /HPF (ref 0–5)

## 2024-12-30 PROCEDURE — 85025 COMPLETE CBC W/AUTO DIFF WBC: CPT

## 2024-12-30 PROCEDURE — 71046 X-RAY EXAM CHEST 2 VIEWS: CPT

## 2024-12-30 PROCEDURE — 83605 ASSAY OF LACTIC ACID: CPT

## 2024-12-30 PROCEDURE — 6370000000 HC RX 637 (ALT 250 FOR IP): Performed by: NURSE PRACTITIONER

## 2024-12-30 PROCEDURE — 96361 HYDRATE IV INFUSION ADD-ON: CPT

## 2024-12-30 PROCEDURE — 81001 URINALYSIS AUTO W/SCOPE: CPT

## 2024-12-30 PROCEDURE — 99284 EMERGENCY DEPT VISIT MOD MDM: CPT

## 2024-12-30 PROCEDURE — 36415 COLL VENOUS BLD VENIPUNCTURE: CPT

## 2024-12-30 PROCEDURE — 80053 COMPREHEN METABOLIC PANEL: CPT

## 2024-12-30 PROCEDURE — 2580000003 HC RX 258: Performed by: NURSE PRACTITIONER

## 2024-12-30 PROCEDURE — 96360 HYDRATION IV INFUSION INIT: CPT

## 2024-12-30 RX ORDER — FLUTICASONE PROPIONATE 50 MCG
1 SPRAY, SUSPENSION (ML) NASAL DAILY PRN
Status: DISCONTINUED | OUTPATIENT
Start: 2024-12-30 | End: 2024-12-31 | Stop reason: HOSPADM

## 2024-12-30 RX ORDER — ACETAMINOPHEN 325 MG/1
650 TABLET ORAL ONCE
Status: COMPLETED | OUTPATIENT
Start: 2024-12-30 | End: 2024-12-30

## 2024-12-30 RX ORDER — GUAIFENESIN/DEXTROMETHORPHAN 100-10MG/5
5 SYRUP ORAL ONCE
Status: COMPLETED | OUTPATIENT
Start: 2024-12-30 | End: 2024-12-30

## 2024-12-30 RX ORDER — IPRATROPIUM BROMIDE AND ALBUTEROL SULFATE 2.5; .5 MG/3ML; MG/3ML
1 SOLUTION RESPIRATORY (INHALATION) ONCE
Status: COMPLETED | OUTPATIENT
Start: 2024-12-30 | End: 2024-12-30

## 2024-12-30 RX ORDER — BENZONATATE 100 MG/1
100 CAPSULE ORAL 3 TIMES DAILY PRN
Qty: 20 CAPSULE | Refills: 0 | Status: SHIPPED | OUTPATIENT
Start: 2024-12-30

## 2024-12-30 RX ORDER — 0.9 % SODIUM CHLORIDE 0.9 %
500 INTRAVENOUS SOLUTION INTRAVENOUS ONCE
Status: COMPLETED | OUTPATIENT
Start: 2024-12-30 | End: 2024-12-30

## 2024-12-30 RX ORDER — IPRATROPIUM BROMIDE AND ALBUTEROL SULFATE 2.5; .5 MG/3ML; MG/3ML
3 SOLUTION RESPIRATORY (INHALATION) EVERY 6 HOURS PRN
Qty: 360 ML | Refills: 0 | Status: SHIPPED | OUTPATIENT
Start: 2024-12-30

## 2024-12-30 RX ADMIN — FLUTICASONE PROPIONATE 1 SPRAY: 50 SPRAY, METERED NASAL at 22:22

## 2024-12-30 RX ADMIN — GUAIFENESIN AND DEXTROMETHORPHAN 5 ML: 100; 10 SYRUP ORAL at 22:21

## 2024-12-30 RX ADMIN — SODIUM CHLORIDE 500 ML: 9 INJECTION, SOLUTION INTRAVENOUS at 21:42

## 2024-12-30 RX ADMIN — ACETAMINOPHEN 650 MG: 325 TABLET ORAL at 21:35

## 2024-12-30 RX ADMIN — IPRATROPIUM BROMIDE AND ALBUTEROL SULFATE 1 DOSE: 2.5; .5 SOLUTION RESPIRATORY (INHALATION) at 21:36

## 2024-12-30 ASSESSMENT — PAIN SCALES - GENERAL: PAINLEVEL_OUTOF10: 9

## 2024-12-30 ASSESSMENT — PAIN DESCRIPTION - LOCATION: LOCATION: HEAD

## 2024-12-30 ASSESSMENT — PAIN DESCRIPTION - DESCRIPTORS: DESCRIPTORS: POUNDING

## 2025-01-04 NOTE — ED PROVIDER NOTES
[facility] ED  EMERGENCY DEPARTMENT ENCOUNTER        Pt Name: Pauline Cordero  MRN: [id number]  Birthdate 1980  Date of evaluation: 2024  Provider: MACHO Fields - CNP  PCP: Orion Miles MD  Note Started: 12:08 PM EST 25      ITZEL. I have evaluated this patient.        CHIEF COMPLAINT       Chief Complaint   Patient presents with    Cough     Pt with cough x approx one month, was treated outpatient with steroids and abx and states that it cleared up for a couple days and then returned. Pt endorses hx of lung issues.       HISTORY OF PRESENT ILLNESS: 1 or more Elements     History From: Patient     Limitations to history : None    Social Determinants Significantly Affecting Health : None    Chief Complaint: Cough     Pauline Cordero is a 44 y.o. female who presents to the emergency department today with symptoms of cough, runny nose and chest congestion.  Symptoms been ongoing for about a month.  States that she had treatment with steroids antibiotics.  States that it cleared up for a couple days and then returned.  Denies any neck pain or back pain.  No nausea or vomiting.  No fevers.  Denies any pleuritic pain.  No chest pain.  No other acute concerns.    Nursing Notes were all reviewed and agreed with or any disagreements were addressed in the HPI.    REVIEW OF SYSTEMS :      Review of Systems    Positives and Pertinent negatives as per HPI.     SURGICAL HISTORY     Past Surgical History:   Procedure Laterality Date    ABDOMEN SURGERY      BRONCHOSCOPY  2016     SECTION      x 2    COLONOSCOPY  2013    normal per pt    CYST REMOVAL      DENTAL SURGERY      teeth removed    DILATATION, ESOPHAGUS      ENDOSCOPY, COLON, DIAGNOSTIC      ESOPHAGEAL DILATATION      GALLBLADDER SURGERY      GASTRIC FUNDOPLICATION  2016    HERNIA REPAIR      HYSTEROSCOPY  09/10/2015    Hysteroscopy, dilation and curettage with the MyoSuLuca givens    OTHER SURGICAL HISTORY   °C)   TempSrc: Oral     SpO2: 96%  97%   Weight: (!) 140.6 kg (310 lb)     Height: 1.651 m (5' 5\")         Patient was given the following medications:  Medications   ipratropium 0.5 mg-albuterol 2.5 mg (DUONEB) nebulizer solution 1 Dose (1 Dose Inhalation Given 12/30/24 2136)   sodium chloride 0.9 % bolus 500 mL (0 mLs IntraVENous Stopped 12/30/24 2325)   acetaminophen (TYLENOL) tablet 650 mg (650 mg Oral Given 12/30/24 2135)   guaiFENesin-dextromethorphan (ROBITUSSIN DM) 100-10 MG/5ML syrup 5 mL (5 mLs Oral Given 12/30/24 2221)             Is this patient to be included in the SEP-1 Core Measure due to severe sepsis or septic shock?   No   Exclusion criteria - the patient is NOT to be included for SEP-1 Core Measure due to:  2+ SIRS criteria are not met      Chronic Conditions affecting care:    has a past medical history of Acute pharyngitis (11/09/2024), Anesthesia complication, Anxiety and depression, Arthritis, Asthma, Asthma with exacerbation (11/09/2024), Bacteremia (05/01/2024), Bronchitis (11/09/2024), Bronchitis (11/09/2024), CAP (community acquired pneumonia) (11/09/2024), Cellulitis of right lower leg (05/01/2024), Chronic bronchitis ([facility]), COPD (chronic obstructive pulmonary disease) ([facility]), Diabetes mellitus ([facility]), Dysuria (01/01/2024), Edema (11/09/2024), Enthesopathy of hip region (11/09/2024), Fibromyalgia, Fibromyositis (08/04/2015), Generalized abdominal pain (11/22/2016), Generalized pain (11/09/2024), blood clots, Hyperlipidemia, Hypertension, Migraine, Neuropathy, Nonsuppurative otitis media (11/09/2024), Pain in joint of left shoulder (03/11/2022), Pleural effusion (02/08/2016), Pneumonia, Rash, Rash (11/09/2024), Reflux, Scabies (11/09/2024), Sebaceous cyst (11/09/2024), Sjogren's disease (HCC), Sleep apnea, Streptococcal bacteremia (05/01/2024), Symptomatic cholelithiasis (10/19/2020), Thyroid disease, Vitamin D deficiency (11/09/2024), 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

## 2025-01-08 ENCOUNTER — HOSPITAL ENCOUNTER (OUTPATIENT)
Dept: CARDIAC REHAB | Age: 45
Setting detail: THERAPIES SERIES
Discharge: HOME | End: 2025-01-08
Attending: INTERNAL MEDICINE

## 2025-01-10 NOTE — CARDIO/PULMONARY
Pt cancelled cardiopulmonary rehab session due to illness. Plan to return Monday 1/13/25.    Electronically signed by Caryn Bradley on 1/10/2025 at 6:58 AM

## 2025-01-13 ENCOUNTER — HOSPITAL ENCOUNTER (OUTPATIENT)
Dept: CARDIAC REHAB | Age: 45
Setting detail: THERAPIES SERIES
Discharge: HOME | End: 2025-01-13
Attending: INTERNAL MEDICINE

## 2025-01-13 NOTE — CARDIO/PULMONARY
Pt called and cancelled cardiopulmonary rehab services.     Electronically signed by Caryn Bradley on 1/13/2025 at 7:11 AM

## 2025-01-20 ENCOUNTER — HOSPITAL ENCOUNTER (OUTPATIENT)
Dept: CARDIAC REHAB | Age: 45
Setting detail: THERAPIES SERIES
Discharge: HOME | End: 2025-01-20
Attending: INTERNAL MEDICINE

## 2025-01-21 NOTE — PLAN OF CARE
Pulmonary Rehab Treatment Plan   Name: Pauline Cordero Assessment Date: 2025   : 1980 Primary Diagnosis:     Age: 44 y.o. Referring Physician: Nakia Burgos   MRN: [id number] Primary Care Physician: Orion Miles MD     Patient unable to complete program at this time due to illness and family concerns.         Treatment Diagnosis  Treatment Diagnosis 1: COPD  Referral Date: 24  Co-morbidities: Diabetes; Pulmonary disease    Oxygen Saturation / Titration   Stages of Change : Other (pt does not use 02)    Oxygen Assessment  Stages of Change : Other (pt does not use 02)  O2 Flow Rate (l/min) - Exercise: 0 l/min (Wiil titrate 02 0-6 NC to keep sp02 greater than 90%)  Breath Sounds: clear, dim  O2 Sat Greater Than 90%: Yes    Individual Treatment Plan  ITP Visit Type: Discharge, did not complete program  1st Date of Exercise: 24  ITP Next Review Date: 25  Visit #/Total Visits: 10/36  FVC (Liters): 70 liters  FEV1 (%PRED): 72    COPD Assessment Test (CAT)  Cough : 4  Phlegm (mucus): 3  Chest Tightness: 3  Breathless When Walking Up a Hill or Flight of Steps: 5  Activities at Home: 3  Confident Leaving Home Due to Lung Condition: 3  Sleep Soundly: 4  Energy Level: 4  CAT Total Score : 29     Dyspnea (Shortness of Breath) Evaluation  Resting, Lying Down: 1  Walking on a Level at Your Own Pace: 3  Walking on a Level with Others Your Age: 5  Walking Up a Hill: 5  Walking Up Stairs: 5  While Eatin  Standing Up From a Chair: 2  Brushing Teeth: 0  Shaving and/or Brushing Hair: 1  Showering/Bathin  Dressin  Picking Up and Straightenin  Doing Dishes: 2  Sweeping/Vacuuming: 3  Making Bed: 2  Shoppin  Doing Laundry: 3  Washing Car: 4  Mowing Lawn: 5  Watering Lawn: 4  Sexual Activities: 5  How Much Does Shortness of Breath Limit You In Your Daily Life: 4  How Much Does the Fear of \"Hurting Myself\" by Overexerting Limit You in Your Daily Life: 4  How Much does

## 2025-01-22 ENCOUNTER — HOSPITAL ENCOUNTER (OUTPATIENT)
Dept: CARDIAC REHAB | Age: 45
Setting detail: THERAPIES SERIES
Discharge: HOME | End: 2025-01-22
Attending: INTERNAL MEDICINE

## 2025-01-29 ENCOUNTER — OFFICE VISIT (OUTPATIENT)
Age: 45
End: 2025-01-29

## 2025-01-29 VITALS
OXYGEN SATURATION: 97 % | HEIGHT: 65 IN | DIASTOLIC BLOOD PRESSURE: 78 MMHG | HEART RATE: 98 BPM | WEIGHT: 293 LBS | SYSTOLIC BLOOD PRESSURE: 112 MMHG | BODY MASS INDEX: 48.82 KG/M2

## 2025-01-29 DIAGNOSIS — M26.609 TMJ (TEMPOROMANDIBULAR JOINT DISORDER): Primary | ICD-10-CM

## 2025-01-29 DIAGNOSIS — M62.838 MUSCLE SPASM: ICD-10-CM

## 2025-01-29 DIAGNOSIS — G43.719 INTRACTABLE CHRONIC MIGRAINE WITHOUT AURA AND WITHOUT STATUS MIGRAINOSUS: ICD-10-CM

## 2025-01-29 NOTE — PATIENT INSTRUCTIONS
YOU MUST CONFIRM YOUR APPOINTMENT 1 DAY PRIOR OR IT WILL BE CANCELLED!!   Our office will call you 3 times the day prior to your appointment in an attempt to confirm.  Please return our call ASAP or confirm your appt through [employer] no later than 3 pm the day before your appointment.  If we do not hear back from you by 3 pm to confirm, your appointment will be cancelled & someone will be added into that slot from our wait list.

## 2025-01-29 NOTE — PROGRESS NOTES
Neurology outpatient new visit    Patient name: Pauline Cordero      Chief Complaint:  Refractory headache.    History of present illness:  This is a 44 years old right-handed female.  The patient is here for evaluation of worsening of headache.  The onset was about 2 months ago.  However, the patient has been diagnosed with chronic migraine headaches since teenager.  Over the last 2 months, the patient reported the new headache, especially over the right side.  The patient reports dull aching pain behind her right eye and occipital area.  The patient also reports subsequent nausea, phonophobia and photophobia.  The headache can last all day long.  It occurs almost every day.  The patient tried multiple medications without significant benefit.  The patient also has CT brain done last year which was reportedly normal.  The patient is noted for underlying sleep apnea.  But the patient never used CPAP.  The patient is also chewing tobacco every day.    Failed medications: Gabapentin, topiramate, propranolol, amitriptyline, Cymbalta, and Pristiq.    Past medical history:    Past Medical History:   Diagnosis Date    Acute pharyngitis 11/09/2024    Anesthesia complication     severe hypotension with block    Anxiety and depression     Arthritis     Asthma     Asthma with exacerbation 11/09/2024    Bacteremia 05/01/2024    Bronchitis 11/09/2024    Bronchitis 11/09/2024    CAP (community acquired pneumonia) 11/09/2024    Cellulitis of right lower leg 05/01/2024    Chronic bronchitis (HCC)     COPD (chronic obstructive pulmonary disease) (HCC)     Diabetes mellitus (HCC)     Dysuria 01/01/2024    Edema 11/09/2024    Enthesopathy of hip region 11/09/2024    Fibromyalgia     Fibromyositis 08/04/2015    Generalized abdominal pain 11/22/2016    SINGLE CONTRAST UPPER GI SERIES (11/29/2016)  FINDINGS:  The esophagus is of normal caliber and demonstrates normal motility. There are no mucosal abnormalities seen. Normal postsurgical

## 2025-01-31 NOTE — PROGRESS NOTES
abnormality.   Total calcium score of 2 is within the 80th percentile for the patient's age of 40 y/o .   No incidental clinically important extracardiac CT findings       Cardiac MRI: 03/28/2022  CONCLUSIONS   Overall unremarkable study with normal LV/RV size and function. No evidence of myocarditis.   -Normal left ventricular size and systolic function with a calculated ejection fraction of 58% by Smith's method. -Low normal LV global longitudinal strain (GLS) -16% -Normal right ventricular size and systolic function with a calculated ejection fraction of 68% by Smith's method. -Small pericardial effusion. -No myocardial edema by T2w imaging/mapping. (Normal myocardium/skeletal ratio - normal < 1.9). -Normal myocardial resting perfusion imaging. -On delayed enhancement imaging, there is no abnormal hyperenhancement to suggest myocardial scar/inflammation/infiltration.     3/12/24 CTPA:  IMPRESSION:  Lobar, segmental pulmonary emboli in the right lower lobe.  No evidence of  right heart strain.    CTPA 3/25/24:  IMPRESSION:  There has been significant improvement and near complete resolution of the  small segmental pulmonary embolus in the right lower lobe seen on the prior  exam of 03/12/2024.  No new pulmonary emboli.    9/1/24 CTPA:  IMPRESSION:  1. No evidence of pulmonary embolus.  2. Increasing airspace disease in the right lower lobe posteriorly with  bronchial wall thickening. This may represent pneumonia superimposed on  chronic atelectasis.  3. Chronic elevation of the right hemidiaphragm.  4. Fatty infiltration of the liver.       [facility]-My[facility]iew 4/16/24:  Summary   Overall findings represent a low risk scan.   Normal LV size and systolic function.   Small distal anterolateral defect, likely artifact, otherwise no evidence of   ischemia.   Non-diagnostic EKG response due to failure to reach target heart rate.    Echo 10/5/2024 ([facility])   Study Conclusions   - Left ventricle: The cavity size

## 2025-02-04 NOTE — DISCHARGE SUMMARY
as: Zithromax  Take 1 tablet by mouth daily for 3 days  Notes to patient: Azithromycin (Zithromax®)  Use: treat Infections.  Side effects: nausea, vomiting, or diarrhea      benzonatate 200 MG capsule  Commonly known as: TESSALON  Take 1 capsule by mouth 3 times daily for 7 days     glipiZIDE 5 MG extended release tablet  Commonly known as: Glucotrol XL  Take 1 tablet by mouth daily     HYDROcodone homatropine 5-1.5 MG/5ML solution  Commonly known as: HYCODAN  Take 5 mLs by mouth every 6 hours as needed (cough) for up to 5 days. Max Daily Amount: 20 mLs     oseltamivir 75 MG capsule  Commonly known as: TAMIFLU  Take 1 capsule by mouth 2 times daily for 4 doses            CONTINUE taking these medications      budesonide 180 MCG/ACT Aepb inhaler  Commonly known as: PULMICORT  Inhale 2 puffs into the lungs in the morning and 2 puffs in the evening.     cetirizine 10 MG tablet  Commonly known as: ZYRTEC  TAKE ONE TABLET BY MOUTH DAILY     Dulera 200-5 MCG/ACT inhaler  Generic drug: mometasone-formoterol  INHALE TWO PUFFS BY MOUTH EVERY MORNING AND INHALE TWO PUFFS BY MOUTH EVERY NIGHT AT BEDTIME . RINSE MOUTH AFTER USE     montelukast 10 MG tablet  Commonly known as: SINGULAIR  TAKE ONE TABLET BY MOUTH EVERY MORNING     omeprazole 40 MG delayed release capsule  Commonly known as: PRILOSEC  Take 1 capsule by mouth every morning (before breakfast)     pravastatin 10 MG tablet  Commonly known as: PRAVACHOL  Take 1 tablet by mouth daily     predniSONE 10 MG tablet  Commonly known as: DELTASONE  Take 40 mg X 3 days and then 30 mg X po daily X 3 days and then 20 mg X 3 days            STOP taking these medications      gabapentin 300 MG capsule  Commonly known as: NEURONTIN     levalbuterol 45 MCG/ACT inhaler  Commonly known as: Xopenex HFA               Where to Get Your Medications        These medications were sent to [facility]/pharmacy #[id number] - [place] [address] - P [phone] F [phone] [address] [facility] 
stated

## 2025-02-05 ENCOUNTER — OFFICE VISIT (OUTPATIENT)
Dept: CARDIOLOGY CLINIC | Age: 45
End: 2025-02-05

## 2025-02-05 ENCOUNTER — TELEPHONE (OUTPATIENT)
Dept: CARDIOLOGY CLINIC | Age: 45
End: 2025-02-05

## 2025-02-05 ENCOUNTER — ANCILLARY PROCEDURE (OUTPATIENT)
Dept: CARDIOLOGY CLINIC | Age: 45
End: 2025-02-05

## 2025-02-05 VITALS
HEART RATE: 103 BPM | BODY MASS INDEX: 48.82 KG/M2 | WEIGHT: 293 LBS | SYSTOLIC BLOOD PRESSURE: 112 MMHG | OXYGEN SATURATION: 96 % | DIASTOLIC BLOOD PRESSURE: 68 MMHG | HEIGHT: 65 IN

## 2025-02-05 DIAGNOSIS — I10 ESSENTIAL HYPERTENSION: ICD-10-CM

## 2025-02-05 DIAGNOSIS — E78.2 COMBINED HYPERLIPIDEMIA ASSOCIATED WITH TYPE 2 DIABETES MELLITUS (HCC): ICD-10-CM

## 2025-02-05 DIAGNOSIS — R00.2 PALPITATION: ICD-10-CM

## 2025-02-05 DIAGNOSIS — F17.200 TOBACCO USE DISORDER: ICD-10-CM

## 2025-02-05 DIAGNOSIS — R06.02 SHORTNESS OF BREATH: ICD-10-CM

## 2025-02-05 DIAGNOSIS — G47.33 OSA (OBSTRUCTIVE SLEEP APNEA): ICD-10-CM

## 2025-02-05 DIAGNOSIS — R00.0 TACHYCARDIA: Primary | ICD-10-CM

## 2025-02-05 DIAGNOSIS — R60.0 BILATERAL EDEMA OF LOWER EXTREMITY: ICD-10-CM

## 2025-02-05 DIAGNOSIS — E11.69 COMBINED HYPERLIPIDEMIA ASSOCIATED WITH TYPE 2 DIABETES MELLITUS (HCC): ICD-10-CM

## 2025-02-05 DIAGNOSIS — Z86.711 HX OF PULMONARY EMBOLUS: ICD-10-CM

## 2025-02-05 DIAGNOSIS — R07.9 CHEST PAIN, UNSPECIFIED TYPE: ICD-10-CM

## 2025-02-05 LAB — ECHO BSA: 2.53 M2

## 2025-02-05 RX ORDER — ASPIRIN 81 MG/1
81 TABLET ORAL DAILY
Qty: 90 TABLET | Refills: 0 | Status: SHIPPED | OUTPATIENT
Start: 2025-02-05

## 2025-02-05 RX ORDER — ACYCLOVIR 800 MG/1
TABLET ORAL
COMMUNITY
Start: 2025-02-04

## 2025-02-05 NOTE — TELEPHONE ENCOUNTER
Monitor placed by [facility]  Monitor company   Length of monitor 30 days  Monitor ordered by NPDE  Phone ID [email or web address]  First Patch ID [id number]  Second Patch ID [id number]  Third Patch ID [id number]  Fourth Patch ID [id number]  Activation successful prior to pt leaving office? Yes

## 2025-02-05 NOTE — PATIENT INSTRUCTIONS
Call [phone] ([phone]) to schedule  -call to schedule stress test    Labs: CMP, lipids and magnesium (Fasting labs)    Referred to [place] [facility] [place]    Add Aspirin 81 mg daily    Continue other medications

## 2025-02-11 ENCOUNTER — TELEPHONE (OUTPATIENT)
Dept: PULMONOLOGY | Age: 45
End: 2025-02-11

## 2025-02-11 NOTE — TELEPHONE ENCOUNTER
Received referral for ARJUN, Called PT and found that her Cardio doctor is wanting her to get another sleep study done. Pt has established care with [facility].

## 2025-02-14 NOTE — TELEPHONE ENCOUNTER
Patient had a PSG in 2015 with a titration study in 2022. Cardiology wants patient to have a new sleep study. Please advise which study you would like her to have?

## 2025-02-20 DIAGNOSIS — J45.909 UNCOMPLICATED ASTHMA, UNSPECIFIED ASTHMA SEVERITY, UNSPECIFIED WHETHER PERSISTENT: ICD-10-CM

## 2025-02-20 DIAGNOSIS — J44.9 CHRONIC OBSTRUCTIVE PULMONARY DISEASE, UNSPECIFIED COPD TYPE (HCC): ICD-10-CM

## 2025-02-20 RX ORDER — MONTELUKAST SODIUM 10 MG/1
10 TABLET ORAL EVERY MORNING
Qty: 30 TABLET | Refills: 2 | Status: SHIPPED | OUTPATIENT
Start: 2025-02-20

## 2025-03-14 ENCOUNTER — RESULTS FOLLOW-UP (OUTPATIENT)
Dept: CARDIOLOGY CLINIC | Age: 45
End: 2025-03-14

## 2025-03-14 LAB — ECHO BSA: 2.53 M2

## 2025-03-15 DIAGNOSIS — J44.9 CHRONIC OBSTRUCTIVE PULMONARY DISEASE, UNSPECIFIED COPD TYPE (HCC): Primary | ICD-10-CM

## 2025-03-17 RX ORDER — TIOTROPIUM BROMIDE INHALATION SPRAY 1.56 UG/1
SPRAY, METERED RESPIRATORY (INHALATION)
Qty: 4 G | Refills: 1 | Status: SHIPPED | OUTPATIENT
Start: 2025-03-17

## 2025-03-26 ENCOUNTER — OFFICE VISIT (OUTPATIENT)
Dept: URGENT CARE | Age: 45
End: 2025-03-26

## 2025-03-26 VITALS
DIASTOLIC BLOOD PRESSURE: 77 MMHG | BODY MASS INDEX: 48.82 KG/M2 | SYSTOLIC BLOOD PRESSURE: 112 MMHG | WEIGHT: 293 LBS | OXYGEN SATURATION: 94 % | HEIGHT: 65 IN | TEMPERATURE: 97.7 F

## 2025-03-26 DIAGNOSIS — J40 BRONCHITIS: ICD-10-CM

## 2025-03-26 DIAGNOSIS — J01.10 ACUTE NON-RECURRENT FRONTAL SINUSITIS: Primary | ICD-10-CM

## 2025-03-26 RX ORDER — METHYLPREDNISOLONE 4 MG/1
TABLET ORAL
Qty: 1 KIT | Refills: 0 | Status: SHIPPED | OUTPATIENT
Start: 2025-03-26 | End: 2025-04-01

## 2025-03-26 RX ORDER — DOXYCYCLINE HYCLATE 100 MG
100 TABLET ORAL 2 TIMES DAILY
Qty: 14 TABLET | Refills: 0 | Status: SHIPPED | OUTPATIENT
Start: 2025-03-26 | End: 2025-04-02

## 2025-03-26 RX ORDER — DEXTROMETHORPHAN HYDROBROMIDE AND PROMETHAZINE HYDROCHLORIDE 15; 6.25 MG/5ML; MG/5ML
5 SYRUP ORAL 4 TIMES DAILY PRN
Qty: 120 ML | Refills: 0 | Status: SHIPPED | OUTPATIENT
Start: 2025-03-26 | End: 2025-04-02

## 2025-03-26 ASSESSMENT — ENCOUNTER SYMPTOMS
NAUSEA: 0
DIARRHEA: 0
EYE DISCHARGE: 0
VOMITING: 0
ABDOMINAL PAIN: 0
COUGH: 1
EYE PAIN: 0
SORE THROAT: 1
EYE REDNESS: 0
SHORTNESS OF BREATH: 0

## 2025-03-26 NOTE — PROGRESS NOTES
Pauline Cordero (: 1980) is a 44 y.o. female, Established patient, here for evaluation of the following chief complaint(s):  Congestion and Cough (Congestion and cough x 4-5 days.  Getting worse and she has COPD.)      ASSESSMENT/PLAN:    ICD-10-CM    1. Acute non-recurrent frontal sinusitis  J01.10 doxycycline hyclate (VIBRA-TABS) 100 MG tablet     promethazine-dextromethorphan (PROMETHAZINE-DM) 6.25-15 MG/5ML syrup      2. Bronchitis  J40 methylPREDNISolone (MEDROL DOSEPACK) 4 MG tablet     promethazine-dextromethorphan (PROMETHAZINE-DM) 6.25-15 MG/5ML syrup          - Pt did not want any testing done. Low concern for strep pharyngitis, otitis media, bacterial pneumonia or sepsis.  - Pt to drink lots of fluids  - Pt to take medication as prescribed  - Pt ok to take tylenol as needed  - Pt to call if any symptoms worsen or follow up with PCP fi not better in 7 days  - Pt to go to ER if have shortness of breath, chest pain, sudden fever or lethargy    Discussed PCP follow up for persisting or worsening symptoms, or to return to the clinic if unable to obtain PCP follow up for worsening symptoms.    The patient tolerated their visit well.      SUBJECTIVE/OBJECTIVE:  HPI:   44 y.o. female presents alone for complaint of pt states she started 6 days ago with nasal congestion and cough.     Admits chills and headache.  Denies fever, body aches, abdominal pain, vomiting or diarrhea.     Has taken cough medication at home. No known sick contacts.     Patient provided the HPI by themself - the patient is considered to be a reliable historian.        History provided by:  Patient   used: No        VITAL SIGNS  Vitals:    25 0820   BP: 112/77   BP Site: Left Upper Arm   Patient Position: Sitting   BP Cuff Size: Large Adult   Temp: 97.7 °F (36.5 °C)   TempSrc: Oral   SpO2: 94%   Weight: (!) 138.3 kg (305 lb)   Height: 1.651 m (5' 5\")       Review of Systems   Constitutional:  Positive

## 2025-04-02 ENCOUNTER — APPOINTMENT (OUTPATIENT)
Dept: CARDIAC REHAB | Age: 45
End: 2025-04-02
Attending: INTERNAL MEDICINE
Payer: COMMERCIAL

## 2025-04-07 ENCOUNTER — APPOINTMENT (OUTPATIENT)
Dept: CARDIAC REHAB | Age: 45
End: 2025-04-07
Attending: INTERNAL MEDICINE
Payer: COMMERCIAL

## 2025-04-08 NOTE — PROGRESS NOTES
Pt resting quietly.  Respirations easy and regular.  Pt get increased SOB with exertion.    Pt denies pain at this time.  Pt anxious to get to an inpatient room.   negative... 86.49

## 2025-04-09 ENCOUNTER — APPOINTMENT (OUTPATIENT)
Dept: CARDIAC REHAB | Age: 45
End: 2025-04-09
Attending: INTERNAL MEDICINE
Payer: COMMERCIAL

## 2025-04-14 ENCOUNTER — APPOINTMENT (OUTPATIENT)
Dept: CARDIAC REHAB | Age: 45
End: 2025-04-14
Attending: INTERNAL MEDICINE
Payer: COMMERCIAL

## 2025-04-16 ENCOUNTER — APPOINTMENT (OUTPATIENT)
Dept: CARDIAC REHAB | Age: 45
End: 2025-04-16
Attending: INTERNAL MEDICINE
Payer: COMMERCIAL

## 2025-04-17 ENCOUNTER — HOSPITAL ENCOUNTER (OUTPATIENT)
Dept: SLEEP CENTER | Age: 45
Discharge: HOME | End: 2025-04-19
Payer: COMMERCIAL

## 2025-04-17 DIAGNOSIS — G47.33 OSA (OBSTRUCTIVE SLEEP APNEA): ICD-10-CM

## 2025-04-17 PROCEDURE — 95811 POLYSOM 6/>YRS CPAP 4/> PARM: CPT

## 2025-04-21 ENCOUNTER — TELEPHONE (OUTPATIENT)
Dept: CARDIOTHORACIC SURGERY | Age: 45
End: 2025-04-21

## 2025-04-21 ENCOUNTER — APPOINTMENT (OUTPATIENT)
Dept: CARDIAC REHAB | Age: 45
End: 2025-04-21
Attending: INTERNAL MEDICINE
Payer: COMMERCIAL

## 2025-04-22 ENCOUNTER — OFFICE VISIT (OUTPATIENT)
Age: 45
End: 2025-04-22
Payer: COMMERCIAL

## 2025-04-22 VITALS
BODY MASS INDEX: 51.25 KG/M2 | DIASTOLIC BLOOD PRESSURE: 64 MMHG | HEART RATE: 115 BPM | WEIGHT: 293 LBS | SYSTOLIC BLOOD PRESSURE: 118 MMHG | OXYGEN SATURATION: 96 %

## 2025-04-22 DIAGNOSIS — K44.9 HIATAL HERNIA WITHOUT GANGRENE AND OBSTRUCTION: Primary | ICD-10-CM

## 2025-04-22 PROCEDURE — G8427 DOCREV CUR MEDS BY ELIG CLIN: HCPCS | Performed by: THORACIC SURGERY (CARDIOTHORACIC VASCULAR SURGERY)

## 2025-04-22 PROCEDURE — 3074F SYST BP LT 130 MM HG: CPT | Performed by: THORACIC SURGERY (CARDIOTHORACIC VASCULAR SURGERY)

## 2025-04-22 PROCEDURE — 99212 OFFICE O/P EST SF 10 MIN: CPT | Performed by: THORACIC SURGERY (CARDIOTHORACIC VASCULAR SURGERY)

## 2025-04-22 PROCEDURE — 3078F DIAST BP <80 MM HG: CPT | Performed by: THORACIC SURGERY (CARDIOTHORACIC VASCULAR SURGERY)

## 2025-04-22 PROCEDURE — 1036F TOBACCO NON-USER: CPT | Performed by: THORACIC SURGERY (CARDIOTHORACIC VASCULAR SURGERY)

## 2025-04-22 PROCEDURE — G8417 CALC BMI ABV UP PARAM F/U: HCPCS | Performed by: THORACIC SURGERY (CARDIOTHORACIC VASCULAR SURGERY)

## 2025-04-22 NOTE — PROGRESS NOTES
F/u for hiatal hernia  Patient BMI still elevated.    Discussed PCP helping with pharma for weight loss.    Also patient to have repeat EGD for Barretts.    F/u with me after EGD to see if patient can obtain a drug for weight loss.    All questions answered  15min including face time greater than 50% coordination care counseling

## 2025-04-23 ENCOUNTER — APPOINTMENT (OUTPATIENT)
Dept: CARDIAC REHAB | Age: 45
End: 2025-04-23
Attending: INTERNAL MEDICINE
Payer: COMMERCIAL

## 2025-05-01 ENCOUNTER — RESULTS FOLLOW-UP (OUTPATIENT)
Dept: PULMONOLOGY | Age: 45
End: 2025-05-01

## 2025-05-01 DIAGNOSIS — G47.33 OSA (OBSTRUCTIVE SLEEP APNEA): Primary | ICD-10-CM

## 2025-05-02 ENCOUNTER — PATIENT MESSAGE (OUTPATIENT)
Dept: PULMONOLOGY | Age: 45
End: 2025-05-02

## 2025-05-02 NOTE — TELEPHONE ENCOUNTER
The orders have been faxed to [facility]. I called milli and let her know this.

## 2025-05-15 NOTE — PROGRESS NOTES
CARDIAC ELECTROPHYSIOLOGY CONSULT NOTE  Date: 5/16/25  Patient Name: Pauline Cordero  YOB: 1980    Primary Care Physician: Orion Miles MD    Referring Physician: Diane Enzweiler, CNP    CHIEF COMPLAINT:   Chief Complaint   Patient presents with    New Patient    Tachycardia    Chest Pain    Shortness of Breath    Dizziness      Pauline Cordero was seen today on 5/16/2025 in consultation due to chief complaint of tachycardia.    Patient is a 45 y.o. 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      She was hospitalized at [facility] from 9/1/2024-9/3/2024 after presenting with c/o PNA. She was dx at Urgent Care and placed on doxycycline for her PNA and R thigh cellulitis. She noted increased SOB and hypoxia and presented to ED. She was admitted and treated for acute respiratory failure with hypoxia, sepsis 2/2 CAP and acute exacerbation of her COPD. She was treated with IV antibiotics and discharged with levaquin and steroids. Echo 10/5/2024 demonstrated an LVEF of 63%.     At cardiology office visit 2/5/2025 patient reported increased episodes of palpitations with associated  lightheadedness/dizziness. EKG showed sinus tachycardia. Patient wore a cardiac event monitor from 2/5/2025 to 3/6/2025 which demonstrated predominately SR with an average HR of 98 () BPM. PAC burden 0.01%, PVC burden 2.28%, Possible inappropriate sinus tachycardia, Nocturnal second degree heart block Mobitz type I.     Today, 5/16/2025, patient reports she has been feeling OK. She reports out of the blue she reports feeling episodes of heart fluttering which makes her lightheaded. She can notice these episodes at rest or when she is active, no direct correlation. She reports occasional chest discomfort. She reports last year having a PE.

## 2025-05-16 ENCOUNTER — OFFICE VISIT (OUTPATIENT)
Dept: CARDIOLOGY CLINIC | Age: 45
End: 2025-05-16

## 2025-05-16 ENCOUNTER — TELEPHONE (OUTPATIENT)
Dept: CARDIOLOGY CLINIC | Age: 45
End: 2025-05-16

## 2025-05-16 VITALS
WEIGHT: 293 LBS | HEART RATE: 108 BPM | OXYGEN SATURATION: 96 % | HEIGHT: 65 IN | BODY MASS INDEX: 48.82 KG/M2 | SYSTOLIC BLOOD PRESSURE: 110 MMHG | DIASTOLIC BLOOD PRESSURE: 70 MMHG

## 2025-05-16 DIAGNOSIS — I47.10 PSVT (PAROXYSMAL SUPRAVENTRICULAR TACHYCARDIA): Primary | ICD-10-CM

## 2025-05-16 DIAGNOSIS — R00.0 TACHYCARDIA: ICD-10-CM

## 2025-05-16 DIAGNOSIS — I47.29 NSVT (NONSUSTAINED VENTRICULAR TACHYCARDIA) (HCC): Primary | ICD-10-CM

## 2025-05-16 DIAGNOSIS — R06.02 SOB (SHORTNESS OF BREATH): Primary | ICD-10-CM

## 2025-05-16 DIAGNOSIS — I47.11 INAPPROPRIATE SINUS TACHYCARDIA: ICD-10-CM

## 2025-05-16 DIAGNOSIS — Z86.711 HISTORY OF PULMONARY EMBOLISM: ICD-10-CM

## 2025-05-16 DIAGNOSIS — I47.10 PSVT (PAROXYSMAL SUPRAVENTRICULAR TACHYCARDIA): ICD-10-CM

## 2025-05-16 DIAGNOSIS — I47.29 NSVT (NONSUSTAINED VENTRICULAR TACHYCARDIA) (HCC): ICD-10-CM

## 2025-05-16 DIAGNOSIS — R42 DIZZINESS: ICD-10-CM

## 2025-05-16 DIAGNOSIS — Z86.718 HISTORY OF DVT IN ADULTHOOD: ICD-10-CM

## 2025-05-16 DIAGNOSIS — I49.3 PVCS (PREMATURE VENTRICULAR CONTRACTIONS): ICD-10-CM

## 2025-05-16 DIAGNOSIS — R00.2 PALPITATIONS: ICD-10-CM

## 2025-05-16 RX ORDER — DILTIAZEM HYDROCHLORIDE 120 MG/1
120 CAPSULE, COATED, EXTENDED RELEASE ORAL DAILY
Qty: 30 CAPSULE | Refills: 5 | Status: SHIPPED | OUTPATIENT
Start: 2025-05-16

## 2025-05-16 NOTE — PATIENT INSTRUCTIONS
PLAN:   -Reviewed results of cardiac monitor   -Discussed risks and benefits of implantable loop recorder (ILR)  -Start Diltiazem 120mg once a day   -Recommend VQ scan   -Follow up after procedure           Your provider has ordered testing for further evaluation.  An order/prescription has been included in your paper work.   To schedule outpatient testing, contact Central Scheduling by calling [address] [place] [address] ([phone]).

## 2025-05-16 NOTE — TELEPHONE ENCOUNTER
Per Central Scheduling protocol pt needs plain CXR ordered to be done for Nuclear stress test with myocardial perfusion. Central will let pt know to go in early before scheduled testing to get done. TY

## 2025-05-16 NOTE — TELEPHONE ENCOUNTER
Case request placed for ILR insertion. Of note, patient should have this after completing VQ scan that is ordered per [facility].     If patient mentions (FYI)-Patient had concerns about a possible nickel allergy. After speaking with [employer] Device Rep he stated \"There are no exposed nickel components. Titanium and polyurethane are the two main components exposed to the patient.\" If patient wishes, a full list of components can be provided than an allergist would need to test for. In office visit, patient did not imply that she wished for further allergy testing and was OK with proceeding with ILR.     Please call patient to schedule.     MEDS TO HOLD:  -lasix the morning of the procedure

## 2025-05-19 PROBLEM — I47.11 INAPPROPRIATE SINUS TACHYCARDIA: Status: ACTIVE | Noted: 2025-05-16

## 2025-05-19 NOTE — TELEPHONE ENCOUNTER
Procedure:  Loop Implant  Doctor:  Dr. Ryan  Date:  6/25/25  Time:  12:30pm  Arrival:  11:30am  Reps:  [employer]  Anesthesia:  n/a      Spoke with patient. Please have patient arrive to the main entrance of [facility] ([address] [place] [id number]) and check in with the registration desk.  They will be directed to the Cath Lab.  Do not apply lotions/creams on skin the day of procedure.    [facility]

## 2025-05-21 ENCOUNTER — HOSPITAL ENCOUNTER (OUTPATIENT)
Dept: GENERAL RADIOLOGY | Age: 45
Discharge: HOME | End: 2025-05-21
Attending: INTERNAL MEDICINE
Payer: COMMERCIAL

## 2025-05-21 ENCOUNTER — HOSPITAL ENCOUNTER (OUTPATIENT)
Dept: NUCLEAR MEDICINE | Age: 45
Discharge: HOME | End: 2025-05-21
Attending: INTERNAL MEDICINE
Payer: COMMERCIAL

## 2025-05-21 ENCOUNTER — RESULTS FOLLOW-UP (OUTPATIENT)
Dept: CARDIOLOGY CLINIC | Age: 45
End: 2025-05-21

## 2025-05-21 DIAGNOSIS — I47.10 PSVT (PAROXYSMAL SUPRAVENTRICULAR TACHYCARDIA): ICD-10-CM

## 2025-05-21 DIAGNOSIS — I47.29 NSVT (NONSUSTAINED VENTRICULAR TACHYCARDIA) (HCC): ICD-10-CM

## 2025-05-21 DIAGNOSIS — R06.02 SOB (SHORTNESS OF BREATH): ICD-10-CM

## 2025-05-21 DIAGNOSIS — I49.3 PVCS (PREMATURE VENTRICULAR CONTRACTIONS): ICD-10-CM

## 2025-05-21 PROCEDURE — A9558 XE133 XENON 10MCI: HCPCS | Performed by: INTERNAL MEDICINE

## 2025-05-21 PROCEDURE — 78582 LUNG VENTILAT&PERFUS IMAGING: CPT

## 2025-05-21 PROCEDURE — 71046 X-RAY EXAM CHEST 2 VIEWS: CPT

## 2025-05-21 PROCEDURE — A9540 TC99M MAA: HCPCS | Performed by: INTERNAL MEDICINE

## 2025-05-21 PROCEDURE — 3430000000 HC RX DIAGNOSTIC RADIOPHARMACEUTICAL: Performed by: INTERNAL MEDICINE

## 2025-05-21 RX ORDER — XENON XE-133 10 MCI/1
20 GAS RESPIRATORY (INHALATION)
Status: COMPLETED | OUTPATIENT
Start: 2025-05-21 | End: 2025-05-21

## 2025-05-21 RX ADMIN — Medication 6 MILLICURIE: at 10:20

## 2025-05-21 RX ADMIN — XENON XE-133 20 MILLICURIE: 10 GAS RESPIRATORY (INHALATION) at 10:15

## 2025-06-02 ENCOUNTER — TELEPHONE (OUTPATIENT)
Dept: CASE MANAGEMENT | Age: 45
End: 2025-06-02

## 2025-06-02 NOTE — TELEPHONE ENCOUNTER
Called pt and left a message to reschedule appt.  Dr. Oliver is out this day, pt needs to be rescheduled.

## 2025-06-10 ENCOUNTER — TELEPHONE (OUTPATIENT)
Dept: CARDIOLOGY CLINIC | Age: 45
End: 2025-06-10

## 2025-06-10 NOTE — TELEPHONE ENCOUNTER
Patient is scheduled to have a Loop Implant on 6/25 with Dr. Ryan.  Patient's insurance is denying the request for authorization.  For peer to peer opportunity, please call and SCHEDULE within 5 days of today.  Call [phone] and refer to Case #[id number] when calling.

## 2025-06-12 NOTE — TELEPHONE ENCOUNTER
Nicolette Ryan, DO  [facility] Z[facility] Ep12 minutes ago (3:52 PM)       Hello, we need to find out the criteria for the loop recorder so we can assess if he needs it.  If he does not Meted, his insurance will not cover it.  Thank you         SZK-per Mary Shultz \"The criteria for most ILR’s is the same. Stroke, TIA, multiple syncopal episodes\" Mary stated you had previously been informed of said criteria as listed.

## 2025-06-13 NOTE — TELEPHONE ENCOUNTER
Nicolette Ryan, DO  [facility] Ep3 minutes ago (7:56 AM)       Hello, it is different per different insurance companies, we need to know every time. If patient does not meet criteria, there is no point in doing peer to peer so we need to know criteria for each insurance company and print it out and keep it. Thank you, Nicolette Ryan

## 2025-06-16 NOTE — TELEPHONE ENCOUNTER
Denial letter received. Will work on appeal. Informed patient of denial and appeal process. Patient gave V/U.     Mary/Rupali MURILLO

## 2025-06-16 NOTE — TELEPHONE ENCOUNTER
Appeal letter created for fast appeal and successfully faxed to fast appeal fax at [phone]. Will await to hear outcome of appeal.      CHIDI Hernandez/Rupali and MA's to be on the lookout for letter from insurance.

## 2025-06-18 NOTE — TELEPHONE ENCOUNTER
I received a letter this morning from [facility] stating that the denial was overturned and patient is now authorized for procedure.  Please schedule patient for procedure.

## 2025-06-25 ENCOUNTER — CLINICAL SUPPORT (OUTPATIENT)
Dept: CARDIOLOGY CLINIC | Age: 45
End: 2025-06-25

## 2025-06-25 ENCOUNTER — HOSPITAL ENCOUNTER (OUTPATIENT)
Age: 45
Setting detail: OUTPATIENT SURGERY
Discharge: HOME | End: 2025-06-25
Attending: INTERNAL MEDICINE | Admitting: INTERNAL MEDICINE
Payer: COMMERCIAL

## 2025-06-25 DIAGNOSIS — I47.11 INAPPROPRIATE SINUS TACHYCARDIA: ICD-10-CM

## 2025-06-25 DIAGNOSIS — R00.2 PALPITATIONS: Primary | ICD-10-CM

## 2025-06-25 DIAGNOSIS — I47.10 PSVT (PAROXYSMAL SUPRAVENTRICULAR TACHYCARDIA): ICD-10-CM

## 2025-06-25 DIAGNOSIS — Z45.09 ENCOUNTER FOR LOOP RECORDER CHECK: Primary | ICD-10-CM

## 2025-06-25 DIAGNOSIS — I47.29 NSVT (NONSUSTAINED VENTRICULAR TACHYCARDIA) (HCC): ICD-10-CM

## 2025-06-25 PROBLEM — I49.3 PVC'S (PREMATURE VENTRICULAR CONTRACTIONS): Status: ACTIVE | Noted: 2025-06-25

## 2025-06-25 PROCEDURE — 6360000002 HC RX W HCPCS: Performed by: INTERNAL MEDICINE

## 2025-06-25 PROCEDURE — 7100000011 HC PHASE II RECOVERY - ADDTL 15 MIN: Performed by: INTERNAL MEDICINE

## 2025-06-25 PROCEDURE — 2709999900 HC NON-CHARGEABLE SUPPLY: Performed by: INTERNAL MEDICINE

## 2025-06-25 PROCEDURE — 7100000010 HC PHASE II RECOVERY - FIRST 15 MIN: Performed by: INTERNAL MEDICINE

## 2025-06-25 PROCEDURE — C1764 EVENT RECORDER, CARDIAC: HCPCS | Performed by: INTERNAL MEDICINE

## 2025-06-25 DEVICE — ICM LNQ22 LINQ II USA
Type: IMPLANTABLE DEVICE | Status: FUNCTIONAL
Brand: LINQ II™

## 2025-06-25 RX ORDER — LIDOCAINE HYDROCHLORIDE 20 MG/ML
INJECTION, SOLUTION EPIDURAL; INFILTRATION; INTRACAUDAL; PERINEURAL PRN
Status: DISCONTINUED | OUTPATIENT
Start: 2025-06-25 | End: 2025-06-25 | Stop reason: HOSPADM

## 2025-06-25 RX ORDER — BUPIVACAINE HYDROCHLORIDE 5 MG/ML
INJECTION, SOLUTION EPIDURAL; INTRACAUDAL; PERINEURAL PRN
Status: DISCONTINUED | OUTPATIENT
Start: 2025-06-25 | End: 2025-06-25 | Stop reason: HOSPADM

## 2025-06-25 NOTE — PROCEDURES
within 1-2 weeks for wound check/device check and with me in 3 months.  -Postoperative care as well as follow-up instructions were explained to the patient in detail.  ------      Nicolette Ryan,   Clinical Cardiac Electrophysiology  06/25/25  1:40 PM    [facility]   [phone] [place] [facility]   [phone] [facility]

## 2025-06-25 NOTE — H&P
CARDIAC ELECTROPHYSIOLOGY NOTE  06/25/25  Patient Name: Pauline Cordero  YOB: 1980    Primary Care Physician: Orion Miles MD     Pauline Cordero was seen today on 6/25/2025     Patient is a 45 y.o. 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, H/A's and seen by neurology, Sjogren's disease, DVT/Pulmonary embolus, RLS, HTN and ARJUN  who was seen today for loop recorder insertion    Patient was having palpitations/lightheadedness/dizziness who had who Mobitz 1 AV block, occasional PVCs, PSVT, NSVT     Today, patient is here for ILR insertion      ALLERGIES:   Allergies   Allergen Reactions    Latex Rash    Percocet [Oxycodone-Acetaminophen] Rash    Dupixent [Dupilumab]     Other Dermatitis     BANDAIDS, ADHESIVES     Oxycodone Hcl Other (See Comments)    Penicillins Swelling    Sulfasalazine     Tetracycline Other (See Comments)    Adhesive Tape Rash    Morphine And Codeine Other (See Comments)     nausea and vomiting      Sulfa Antibiotics Rash        MEDICATIONS:   No current facility-administered medications for this encounter.        Past Medical History:   Past Medical History:   Diagnosis Date    Acute pharyngitis 11/09/2024    Anesthesia complication     severe hypotension with block    Anxiety and depression     Arthritis     Asthma     Asthma with exacerbation 11/09/2024    Bacteremia 05/01/2024    Bronchitis 11/09/2024    Bronchitis 11/09/2024    CAP (community acquired pneumonia) 11/09/2024    Cellulitis of right lower leg 05/01/2024    Chronic bronchitis (HCC)     COPD (chronic obstructive pulmonary disease) (HCC)     Diabetes mellitus (HCC)     Dysuria 01/01/2024    Edema 11/09/2024    Enthesopathy of hip region 11/09/2024    Fibromyalgia     Fibromyositis 08/04/2015    Generalized abdominal pain 11/22/2016    SINGLE CONTRAST UPPER GI SERIES (11/29/2016)  FINDINGS:  The esophagus is of normal caliber and demonstrates

## 2025-06-25 NOTE — PROGRESS NOTES
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, H/A's and seen by neurology, Sjogren's disease, DVT/Pulmonary embolus, RLS, HTN and ARJUN  who was seen today for loop recorder insertion     Patient was having palpitations/lightheadedness/dizziness who had who Mobitz 1 AV block, occasional PVCs, PSVT, NSVT    DEVICE MODEL  [id number] LINQ II™  DEVICE SERIAL NUMBER  [id number]  DEVICE TYPE  ICM  DATE OF IMPLANT  25-Jun-2025  Monitor Information  MONITOR STATUS  Transmission Received  DISTRIBUTION METHOD  Distributed from clinic inventory  DISTRIBUTION DATE  25-Jun-2025  MONITOR SERIAL NUMBER  [id number]  MONITOR MODEL  [employer] Relay™ [id number]

## 2025-06-30 ENCOUNTER — TELEPHONE (OUTPATIENT)
Dept: CARDIOLOGY CLINIC | Age: 45
End: 2025-06-30

## 2025-06-30 NOTE — TELEPHONE ENCOUNTER
Pt had Loop Record placed 6/25/2025. Pt is having really bad itching at site and skin has broken down. The bandage has partially come off due to sweating. Pt has appt on 7/3/25 for site check. What should pt do.

## 2025-06-30 NOTE — TELEPHONE ENCOUNTER
I spoke with the patient and she states the outer bandage is dirty and coming off. I advised her to remove the outer bandage. I advised that she could use hydrocortisone cream where the outer bandage caused irritation, being careful to avoid the incision site itself. She V/U and will keep the clinic updated on status changes.

## 2025-07-02 ENCOUNTER — OFFICE VISIT (OUTPATIENT)
Dept: PULMONOLOGY | Age: 45
End: 2025-07-02
Payer: COMMERCIAL

## 2025-07-02 VITALS
RESPIRATION RATE: 17 BRPM | HEART RATE: 115 BPM | SYSTOLIC BLOOD PRESSURE: 118 MMHG | HEIGHT: 65 IN | WEIGHT: 293 LBS | BODY MASS INDEX: 48.82 KG/M2 | DIASTOLIC BLOOD PRESSURE: 60 MMHG | OXYGEN SATURATION: 97 %

## 2025-07-02 DIAGNOSIS — J45.909 UNCOMPLICATED ASTHMA, UNSPECIFIED ASTHMA SEVERITY, UNSPECIFIED WHETHER PERSISTENT: Primary | ICD-10-CM

## 2025-07-02 PROCEDURE — 3078F DIAST BP <80 MM HG: CPT | Performed by: INTERNAL MEDICINE

## 2025-07-02 PROCEDURE — 99214 OFFICE O/P EST MOD 30 MIN: CPT | Performed by: INTERNAL MEDICINE

## 2025-07-02 PROCEDURE — G8427 DOCREV CUR MEDS BY ELIG CLIN: HCPCS | Performed by: INTERNAL MEDICINE

## 2025-07-02 PROCEDURE — G8417 CALC BMI ABV UP PARAM F/U: HCPCS | Performed by: INTERNAL MEDICINE

## 2025-07-02 PROCEDURE — 1036F TOBACCO NON-USER: CPT | Performed by: INTERNAL MEDICINE

## 2025-07-02 PROCEDURE — 3074F SYST BP LT 130 MM HG: CPT | Performed by: INTERNAL MEDICINE

## 2025-07-02 RX ORDER — FUROSEMIDE 20 MG/1
20 TABLET ORAL DAILY
Qty: 30 TABLET | Refills: 3 | Status: SHIPPED | OUTPATIENT
Start: 2025-07-02

## 2025-07-02 RX ORDER — BUDESONIDE AND FORMOTEROL FUMARATE DIHYDRATE 160; 4.5 UG/1; UG/1
2 AEROSOL RESPIRATORY (INHALATION) 2 TIMES DAILY
Qty: 1 EACH | Refills: 2 | Status: SHIPPED | OUTPATIENT
Start: 2025-07-02

## 2025-07-02 RX ORDER — AZITHROMYCIN 250 MG/1
250 TABLET, FILM COATED ORAL DAILY
Qty: 7 TABLET | Refills: 0 | Status: SHIPPED | OUTPATIENT
Start: 2025-07-02 | End: 2025-07-09

## 2025-07-02 RX ORDER — ALBUTEROL SULFATE 90 UG/1
2 INHALANT RESPIRATORY (INHALATION) EVERY 4 HOURS PRN
Qty: 18 G | Refills: 6 | Status: SHIPPED | OUTPATIENT
Start: 2025-07-02

## 2025-07-02 ASSESSMENT — SLEEP AND FATIGUE QUESTIONNAIRES
HOW LIKELY ARE YOU TO NOD OFF OR FALL ASLEEP WHILE SITTING AND READING: MODERATE CHANCE OF DOZING
HOW LIKELY ARE YOU TO NOD OFF OR FALL ASLEEP WHILE SITTING INACTIVE IN A PUBLIC PLACE: MODERATE CHANCE OF DOZING
HOW LIKELY ARE YOU TO NOD OFF OR FALL ASLEEP WHILE SITTING AND TALKING TO SOMEONE: MODERATE CHANCE OF DOZING
HOW LIKELY ARE YOU TO NOD OFF OR FALL ASLEEP WHILE WATCHING TV: HIGH CHANCE OF DOZING
HOW LIKELY ARE YOU TO NOD OFF OR FALL ASLEEP IN A CAR, WHILE STOPPED FOR A FEW MINUTES IN TRAFFIC: SLIGHT CHANCE OF DOZING
HOW LIKELY ARE YOU TO NOD OFF OR FALL ASLEEP WHILE SITTING QUIETLY AFTER LUNCH WITHOUT ALCOHOL: HIGH CHANCE OF DOZING
HOW LIKELY ARE YOU TO NOD OFF OR FALL ASLEEP WHILE LYING DOWN TO REST IN THE AFTERNOON WHEN CIRCUMSTANCES PERMIT: HIGH CHANCE OF DOZING
HOW LIKELY ARE YOU TO NOD OFF OR FALL ASLEEP WHEN YOU ARE A PASSENGER IN A CAR FOR AN HOUR WITHOUT A BREAK: HIGH CHANCE OF DOZING
ESS TOTAL SCORE: 19

## 2025-07-02 NOTE — PROGRESS NOTES
Pulmonary Clinic Consult     I had the pleasure of seeing  Pauline Cordero     Chief Complaint   Patient presents with    Sleep Apnea    Follow-up     31-90 day F/U not using machine because of the mask fell apart and waiting on new one.       HISTORY OF PRESENT ILLNESS:        Interval History: 41 y.o. female The pt had COVID 19 last Jan 2022 with significant cough and SOB however it is better .   Still chewing nicotine gum 1 pack day ,she quit 2015     She states she still has some SOB and PEOPLES and she has some cough  and Peoples    SHE HAD diaphragm paralysis left side    She can 1/2 block ,abpout 2 00 feet     She recently broke /twist November .towrn ligament s/p surgery   She use xopenex as needed and she needed 2-3 time week  She use nebulizer   She use dulera    She developed allergy   No chest paoin ,no Hemoptysis     Today visit  States that her SOB improved   Went [place] and came with what seems viral like illness  Not on steroids   Less SOB and cough but still present  Some tightness and PEOPLES      ALLERGIES:    Allergies   Allergen Reactions    Latex Rash    Percocet [Oxycodone-Acetaminophen] Rash    Dupixent [Dupilumab]     Other Dermatitis     BANDAIDS, ADHESIVES     Oxycodone Hcl Other (See Comments)    Penicillins Swelling    Sulfasalazine     Tetracycline Other (See Comments)    Adhesive Tape Rash    Morphine And Codeine Other (See Comments)     nausea and vomiting      Sulfa Antibiotics Rash       PAST MEDICAL HISTORY:       Diagnosis Date    Acute pharyngitis 11/09/2024    Anesthesia complication     severe hypotension with block    Anxiety and depression     Arthritis     Asthma     Asthma with exacerbation 11/09/2024    Bacteremia 05/01/2024    Bronchitis 11/09/2024    Bronchitis 11/09/2024    CAP (community acquired pneumonia) 11/09/2024    Cellulitis of right lower leg 05/01/2024    Chronic bronchitis (HCC)     COPD (chronic obstructive pulmonary

## 2025-07-03 ENCOUNTER — CLINICAL SUPPORT (OUTPATIENT)
Dept: CARDIOLOGY CLINIC | Age: 45
End: 2025-07-03

## 2025-07-03 DIAGNOSIS — Z45.09 ENCOUNTER FOR LOOP RECORDER CHECK: Primary | ICD-10-CM

## 2025-07-03 DIAGNOSIS — R42 DIZZINESS: ICD-10-CM

## 2025-07-03 NOTE — PROGRESS NOTES
Incision is closed, clean, and dry with all dressings/steri strips removed. Site left open to the air. Incision well approximated. No s/s of infection.      Patient education was provided about site care, device functionality, in home monitoring, and any other patient questions and/or concerns were addressed. Aftercare and remote monitoring literature was provided. Patient is to call with any signs or symptoms of infection. Patient voices understanding.

## 2025-07-03 NOTE — PATIENT INSTRUCTIONS
New Cardiac Device Implant (Pacemaker and/or Defibrillator) Post Op Instructions  Bathe with water indirectly hitting the incision site. Water and soap may run over the incision site. Do not scrub. Pat dry with a clean towel after bathing.   Leave incision open to the air; do not apply any dressings, ointments, or bandages to the area. Do not apply lotion, perfume/cologne, or powders to the area until it is completely healed.   Any scabbing or skin glue that is noted will fall off within 1-2 weeks after the post op appointment.   If any oozing, bleeding, or pus drainage occurs, please call the office immediately at [phone].       Patient has movement restrictions in place until 4 weeks post op (to the day of implant) unless otherwise instructed by physician.   Patient may not lift the device side arm above shoulder height.   Do not far reach or stretch across body or behind body with effected side.   Do not use this arm for pushing, pulling, or lifting body.   Do not use cane on the effected side.   Patient may not lift anything heavier than a gallon of milk with the associated arm.     Appointments to expect going forward:  Post operatively the patient will have had a 1-week post op check and a 3 month follow up with NP/MD and the device clinic.       Remote Monitoring Instructions    Within 2-3 weeks of your device being implanted, you will receive a call from the  of your device. Please answer this call as it is to set up remote monitoring for your device. Once you receive your in-home monitor, please follow the instructions provided to sync the home monitor to your implanted device. Once you have paired your home monitor to your implanted device, keep your monitor plugged in within 6 feet of where you sleep. Your monitor will routinely check in with your device during sleep hours and transmit any urgent events to the Device Clinic for review.     Please do not send additional routine

## 2025-07-17 ENCOUNTER — HOSPITAL ENCOUNTER (OUTPATIENT)
Age: 45
Discharge: HOME | End: 2025-07-19
Payer: COMMERCIAL

## 2025-07-17 ENCOUNTER — HOSPITAL ENCOUNTER (OUTPATIENT)
Dept: NUCLEAR MEDICINE | Age: 45
Discharge: HOME | End: 2025-07-17
Payer: COMMERCIAL

## 2025-07-17 DIAGNOSIS — R06.02 SHORTNESS OF BREATH: ICD-10-CM

## 2025-07-17 DIAGNOSIS — R07.9 CHEST PAIN, UNSPECIFIED TYPE: ICD-10-CM

## 2025-07-17 PROCEDURE — A9502 TC99M TETROFOSMIN: HCPCS | Performed by: NURSE PRACTITIONER

## 2025-07-17 PROCEDURE — 78452 HT MUSCLE IMAGE SPECT MULT: CPT

## 2025-07-17 PROCEDURE — 6360000002 HC RX W HCPCS: Performed by: NURSE PRACTITIONER

## 2025-07-17 PROCEDURE — 93017 CV STRESS TEST TRACING ONLY: CPT

## 2025-07-17 PROCEDURE — 3430000000 HC RX DIAGNOSTIC RADIOPHARMACEUTICAL: Performed by: NURSE PRACTITIONER

## 2025-07-17 RX ORDER — REGADENOSON 0.08 MG/ML
0.4 INJECTION, SOLUTION INTRAVENOUS
Status: COMPLETED | OUTPATIENT
Start: 2025-07-17 | End: 2025-07-17

## 2025-07-17 RX ADMIN — TETROFOSMIN 29 MILLICURIE: 1.38 INJECTION, POWDER, LYOPHILIZED, FOR SOLUTION INTRAVENOUS at 08:17

## 2025-07-17 RX ADMIN — REGADENOSON 0.4 MG: 0.08 INJECTION, SOLUTION INTRAVENOUS at 08:18

## 2025-07-17 NOTE — NURSING NOTE
Pt completed stress portion of cardiac stress test. Pt is discharged to nuclear department for final scan. Nuclear tech will remove PIV. Discharge instructions given to pt. Pt verbalizes understanding to discharge instructions. Patient is a 2 day test. Will return tomorrow for resting images.

## 2025-07-18 ENCOUNTER — HOSPITAL ENCOUNTER (OUTPATIENT)
Dept: NUCLEAR MEDICINE | Age: 45
Discharge: HOME | End: 2025-07-18
Payer: COMMERCIAL

## 2025-07-18 LAB
NUC STRESS EJECTION FRACTION: 72 %
NUC STRESS LV EDV: 50 ML (ref 56–104)
NUC STRESS LV ESV: 14 ML (ref 19–49)
NUC STRESS LV MASS: 93 G
STRESS BASELINE DIAS BP: 63 MMHG
STRESS BASELINE HR: 87 BPM
STRESS BASELINE SYS BP: 88 MMHG
STRESS ESTIMATED WORKLOAD: 1 METS
STRESS PEAK DIAS BP: 72 MMHG
STRESS PEAK SYS BP: 120 MMHG
STRESS PERCENT HR ACHIEVED: 65 %
STRESS POST PEAK HR: 113 BPM
STRESS RATE PRESSURE PRODUCT: ABNORMAL BPM*MMHG
STRESS TARGET HR: 174 BPM

## 2025-07-18 PROCEDURE — A9502 TC99M TETROFOSMIN: HCPCS | Performed by: NURSE PRACTITIONER

## 2025-07-18 PROCEDURE — 3430000000 HC RX DIAGNOSTIC RADIOPHARMACEUTICAL: Performed by: NURSE PRACTITIONER

## 2025-07-18 RX ADMIN — TETROFOSMIN 30.6 MILLICURIE: 1.38 INJECTION, POWDER, LYOPHILIZED, FOR SOLUTION INTRAVENOUS at 08:40

## 2025-07-21 ENCOUNTER — TELEPHONE (OUTPATIENT)
Dept: CARDIOLOGY CLINIC | Age: 45
End: 2025-07-21

## 2025-07-21 NOTE — TELEPHONE ENCOUNTER
----- Message from Dr. Nicolette Ryan DO sent at 7/21/2025  1:16 PM EDT -----  Patient had a low PVC burden, but had significant symptoms when she did have more PVCs during that time.  She should continue her current medications.  If needed, she can take an extra diltiazem when she has significant symptoms.  Thank you

## 2025-07-23 ENCOUNTER — TELEPHONE (OUTPATIENT)
Dept: PULMONOLOGY | Age: 45
End: 2025-07-23

## 2025-07-23 NOTE — TELEPHONE ENCOUNTER
Jeannine from [facility] called and pt is wanting to get a mask for her oxygen. [facility] said they have a sample mask they can give her. Since the ot has loop recorder  Has to approve if it ok for her to her air fit f20 with magnetic clips.       Call back number [phone]

## 2025-07-24 NOTE — TELEPHONE ENCOUNTER
Nakia Burgos MD Darnell, Jacqueline21 hours ago (3:56 PM)     Ok  Do I need to call or just give the ok     I called Jeannine back and gave her the okay.

## 2025-07-25 ENCOUNTER — TRANSCRIBE ORDERS (OUTPATIENT)
Dept: ADMINISTRATIVE | Age: 45
End: 2025-07-25

## 2025-07-25 DIAGNOSIS — J44.9 CHRONIC OBSTRUCTIVE PULMONARY DISEASE, UNSPECIFIED COPD TYPE (HCC): Primary | ICD-10-CM

## 2025-07-25 DIAGNOSIS — J45.50 SEVERE PERSISTENT ASTHMA, UNSPECIFIED WHETHER COMPLICATED (HCC): ICD-10-CM

## 2025-07-25 DIAGNOSIS — K21.00 GASTROESOPHAGEAL REFLUX DISEASE WITH ESOPHAGITIS, UNSPECIFIED WHETHER HEMORRHAGE: Primary | ICD-10-CM

## 2025-07-25 DIAGNOSIS — Z98.890 S/P LAPAROSCOPIC FUNDOPLICATION: ICD-10-CM

## 2025-07-25 DIAGNOSIS — J45.50 UNCOMPLICATED SEVERE PERSISTENT ASTHMA (HCC): ICD-10-CM

## 2025-07-25 DIAGNOSIS — R11.2 NAUSEA AND VOMITING, UNSPECIFIED VOMITING TYPE: ICD-10-CM

## 2025-07-25 NOTE — TELEPHONE ENCOUNTER
Patient saw dr Welch on 7/2 and we are starting process of getting pt set up on [facility].    Med pended -please sign.

## 2025-07-26 RX ORDER — TEZEPELUMAB-EKKO 210 MG/1.9ML
210 INJECTION, SOLUTION SUBCUTANEOUS
Qty: 1.91 ML | Refills: 11 | Status: SHIPPED | OUTPATIENT
Start: 2025-07-26

## 2025-07-29 ENCOUNTER — OFFICE VISIT (OUTPATIENT)
Age: 45
End: 2025-07-29
Payer: COMMERCIAL

## 2025-07-29 VITALS
WEIGHT: 293 LBS | OXYGEN SATURATION: 95 % | SYSTOLIC BLOOD PRESSURE: 124 MMHG | HEART RATE: 100 BPM | DIASTOLIC BLOOD PRESSURE: 82 MMHG | BODY MASS INDEX: 51.59 KG/M2

## 2025-07-29 DIAGNOSIS — K21.00 GASTROESOPHAGEAL REFLUX DISEASE WITH ESOPHAGITIS WITHOUT HEMORRHAGE: Primary | ICD-10-CM

## 2025-07-29 PROCEDURE — 3074F SYST BP LT 130 MM HG: CPT | Performed by: THORACIC SURGERY (CARDIOTHORACIC VASCULAR SURGERY)

## 2025-07-29 PROCEDURE — 3079F DIAST BP 80-89 MM HG: CPT | Performed by: THORACIC SURGERY (CARDIOTHORACIC VASCULAR SURGERY)

## 2025-07-29 PROCEDURE — 1036F TOBACCO NON-USER: CPT | Performed by: THORACIC SURGERY (CARDIOTHORACIC VASCULAR SURGERY)

## 2025-07-29 PROCEDURE — 99214 OFFICE O/P EST MOD 30 MIN: CPT | Performed by: THORACIC SURGERY (CARDIOTHORACIC VASCULAR SURGERY)

## 2025-07-29 PROCEDURE — G8417 CALC BMI ABV UP PARAM F/U: HCPCS | Performed by: THORACIC SURGERY (CARDIOTHORACIC VASCULAR SURGERY)

## 2025-07-29 PROCEDURE — G8427 DOCREV CUR MEDS BY ELIG CLIN: HCPCS | Performed by: THORACIC SURGERY (CARDIOTHORACIC VASCULAR SURGERY)

## 2025-07-29 NOTE — PROGRESS NOTES
Chief complaint hiatal hernia  Diagnosis hiatal hernia  Vitals checked    I spent most of the discussion regarding:  Weight loss  Exercise  Decreasing caloric intake  Medication for weight loss  Surgery at BMI 30s    Patient is motivated.  Will offer surgery hiatal hernia repair when BMI is in 30s  All questions answered  F/u with me in 6mos  30min including face time greater than 50% coordination care counseling

## 2025-08-25 ENCOUNTER — TELEPHONE (OUTPATIENT)
Dept: PULMONOLOGY | Age: 45
End: 2025-08-25

## 2025-08-27 ENCOUNTER — OFFICE VISIT (OUTPATIENT)
Age: 45
End: 2025-08-27
Payer: COMMERCIAL

## 2025-08-27 VITALS
BODY MASS INDEX: 48.82 KG/M2 | OXYGEN SATURATION: 98 % | HEIGHT: 65 IN | HEART RATE: 114 BPM | WEIGHT: 293 LBS | SYSTOLIC BLOOD PRESSURE: 120 MMHG | DIASTOLIC BLOOD PRESSURE: 60 MMHG

## 2025-08-27 DIAGNOSIS — R00.0 TACHYCARDIA: ICD-10-CM

## 2025-08-27 DIAGNOSIS — R00.0 SINUS TACHYCARDIA: ICD-10-CM

## 2025-08-27 DIAGNOSIS — R06.02 SHORTNESS OF BREATH: ICD-10-CM

## 2025-08-27 DIAGNOSIS — I49.3 PVC'S (PREMATURE VENTRICULAR CONTRACTIONS): ICD-10-CM

## 2025-08-27 DIAGNOSIS — I10 ESSENTIAL (PRIMARY) HYPERTENSION: ICD-10-CM

## 2025-08-27 DIAGNOSIS — R07.9 CHEST PAIN, UNSPECIFIED TYPE: ICD-10-CM

## 2025-08-27 DIAGNOSIS — I26.99 ACUTE PULMONARY EMBOLISM, UNSPECIFIED PULMONARY EMBOLISM TYPE, UNSPECIFIED WHETHER ACUTE COR PULMONALE PRESENT (HCC): ICD-10-CM

## 2025-08-27 DIAGNOSIS — R60.0 BILATERAL EDEMA OF LOWER EXTREMITY: ICD-10-CM

## 2025-08-27 DIAGNOSIS — G47.33 OSA (OBSTRUCTIVE SLEEP APNEA): ICD-10-CM

## 2025-08-27 DIAGNOSIS — R42 DIZZINESS: ICD-10-CM

## 2025-08-27 DIAGNOSIS — I10 PRIMARY HYPERTENSION: Primary | ICD-10-CM

## 2025-08-27 DIAGNOSIS — E78.5 HYPERLIPIDEMIA, UNSPECIFIED HYPERLIPIDEMIA TYPE: ICD-10-CM

## 2025-08-27 DIAGNOSIS — I47.10 PSVT (PAROXYSMAL SUPRAVENTRICULAR TACHYCARDIA): ICD-10-CM

## 2025-08-27 DIAGNOSIS — I47.29 NSVT (NONSUSTAINED VENTRICULAR TACHYCARDIA) (HCC): ICD-10-CM

## 2025-08-27 DIAGNOSIS — R00.2 PALPITATIONS: ICD-10-CM

## 2025-08-27 DIAGNOSIS — F17.200 TOBACCO USE DISORDER: ICD-10-CM

## 2025-08-27 DIAGNOSIS — I10 ESSENTIAL HYPERTENSION: ICD-10-CM

## 2025-08-27 PROCEDURE — 99214 OFFICE O/P EST MOD 30 MIN: CPT | Performed by: INTERNAL MEDICINE

## 2025-08-27 PROCEDURE — G8427 DOCREV CUR MEDS BY ELIG CLIN: HCPCS | Performed by: INTERNAL MEDICINE

## 2025-08-27 PROCEDURE — 3074F SYST BP LT 130 MM HG: CPT | Performed by: INTERNAL MEDICINE

## 2025-08-27 PROCEDURE — 3078F DIAST BP <80 MM HG: CPT | Performed by: INTERNAL MEDICINE

## 2025-08-27 PROCEDURE — G8417 CALC BMI ABV UP PARAM F/U: HCPCS | Performed by: INTERNAL MEDICINE

## 2025-08-27 PROCEDURE — 1036F TOBACCO NON-USER: CPT | Performed by: INTERNAL MEDICINE

## 2025-08-27 RX ORDER — ASPIRIN 81 MG/1
81 TABLET ORAL DAILY
Qty: 90 TABLET | Refills: 3 | Status: SHIPPED | OUTPATIENT
Start: 2025-08-27

## 2025-08-27 RX ORDER — PRAVASTATIN SODIUM 40 MG
40 TABLET ORAL DAILY
Qty: 90 TABLET | Refills: 3 | Status: SHIPPED | OUTPATIENT
Start: 2025-08-27

## 2025-09-03 ENCOUNTER — OFFICE VISIT (OUTPATIENT)
Dept: URGENT CARE | Age: 45
End: 2025-09-03

## 2025-09-03 VITALS
TEMPERATURE: 98.2 F | SYSTOLIC BLOOD PRESSURE: 120 MMHG | OXYGEN SATURATION: 98 % | RESPIRATION RATE: 18 BRPM | HEART RATE: 110 BPM | BODY MASS INDEX: 51.75 KG/M2 | WEIGHT: 293 LBS | DIASTOLIC BLOOD PRESSURE: 78 MMHG

## 2025-09-03 DIAGNOSIS — J45.51 SEVERE PERSISTENT ASTHMA WITH ACUTE EXACERBATION (HCC): Primary | ICD-10-CM

## 2025-09-03 DIAGNOSIS — J01.90 ACUTE BACTERIAL SINUSITIS: ICD-10-CM

## 2025-09-03 DIAGNOSIS — J34.89 RHINORRHEA: ICD-10-CM

## 2025-09-03 DIAGNOSIS — R09.81 NASAL CONGESTION: ICD-10-CM

## 2025-09-03 DIAGNOSIS — R06.2 WHEEZING: ICD-10-CM

## 2025-09-03 DIAGNOSIS — R06.02 SOB (SHORTNESS OF BREATH): ICD-10-CM

## 2025-09-03 DIAGNOSIS — R09.82 POSTNASAL DRIP: ICD-10-CM

## 2025-09-03 DIAGNOSIS — R05.1 ACUTE COUGH: ICD-10-CM

## 2025-09-03 DIAGNOSIS — B96.89 ACUTE BACTERIAL SINUSITIS: ICD-10-CM

## 2025-09-03 PROBLEM — E11.40 TYPE 2 DIABETES MELLITUS WITH DIABETIC NEUROPATHY, WITHOUT LONG-TERM CURRENT USE OF INSULIN (HCC): Status: ACTIVE | Noted: 2025-05-02
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
## 2025-09-03 PROBLEM — G43.909 MIGRAINE HEADACHE: Status: ACTIVE | Noted: 2025-06-13

## 2025-09-03 PROBLEM — N95.1 MENOPAUSAL SYMPTOMS: Status: ACTIVE | Noted: 2025-05-02

## 2025-09-03 PROBLEM — M79.601 PAIN OF RIGHT UPPER EXTREMITY: Status: RESOLVED | Noted: 2025-06-12 | Resolved: 2025-09-03

## 2025-09-03 PROBLEM — R51.9 FRONTAL HEADACHE: Status: RESOLVED | Noted: 2025-09-03 | Resolved: 2025-09-03

## 2025-09-03 PROBLEM — M79.605 PAIN OF LEFT LOWER EXTREMITY: Status: RESOLVED | Noted: 2025-06-07 | Resolved: 2025-09-03

## 2025-09-03 PROBLEM — F07.81 POSTCONCUSSION SYNDROME: Status: ACTIVE | Noted: 2024-11-09

## 2025-09-03 PROBLEM — B95.5 STREPTOCOCCAL BACTEREMIA: Status: RESOLVED | Noted: 2024-05-01 | Resolved: 2025-09-03

## 2025-09-03 PROBLEM — L03.119 CELLULITIS OF LOWER LEG: Status: RESOLVED | Noted: 2024-05-01 | Resolved: 2025-09-03

## 2025-09-03 PROBLEM — H93.12 TINNITUS OF LEFT EAR: Status: RESOLVED | Noted: 2025-06-08 | Resolved: 2025-09-03

## 2025-09-03 PROBLEM — R78.81 GRAM-POSITIVE BACTEREMIA: Status: RESOLVED | Noted: 2024-05-01 | Resolved: 2025-09-03

## 2025-09-03 PROBLEM — M25.512 PAIN IN JOINT OF LEFT SHOULDER: Status: RESOLVED | Noted: 2022-03-11 | Resolved: 2025-09-03

## 2025-09-03 PROBLEM — R78.81 STREPTOCOCCAL BACTEREMIA: Status: RESOLVED | Noted: 2024-05-01 | Resolved: 2025-09-03

## 2025-09-03 PROBLEM — M19.90 OSTEOARTHRITIS: Status: ACTIVE | Noted: 2023-10-09

## 2025-09-03 RX ORDER — ONDANSETRON 4 MG/1
TABLET, FILM COATED ORAL
COMMUNITY
Start: 2025-06-14

## 2025-09-03 RX ORDER — IPRATROPIUM BROMIDE AND ALBUTEROL SULFATE 2.5; .5 MG/3ML; MG/3ML
1 SOLUTION RESPIRATORY (INHALATION) ONCE
Status: COMPLETED | OUTPATIENT
Start: 2025-09-03 | End: 2025-09-03

## 2025-09-03 RX ORDER — SUMATRIPTAN SUCCINATE 100 MG/1
TABLET ORAL
COMMUNITY
Start: 2025-06-09

## 2025-09-03 RX ORDER — BENZONATATE 200 MG/1
200 CAPSULE ORAL 3 TIMES DAILY PRN
Qty: 30 CAPSULE | Refills: 0 | Status: SHIPPED | OUTPATIENT
Start: 2025-09-03 | End: 2025-09-13

## 2025-09-03 RX ORDER — TOPIRAMATE 25 MG/1
TABLET, FILM COATED ORAL
COMMUNITY
Start: 2025-07-11

## 2025-09-03 RX ORDER — COLESEVELAM 180 1/1
TABLET ORAL
COMMUNITY
Start: 2025-07-24

## 2025-09-03 RX ORDER — ERGOCALCIFEROL 1.25 MG/1
50000 CAPSULE, LIQUID FILLED ORAL
COMMUNITY
Start: 2025-05-03

## 2025-09-03 RX ORDER — SCOPOLAMINE 1 MG/3D
1 PATCH, EXTENDED RELEASE TRANSDERMAL
COMMUNITY
Start: 2025-08-06

## 2025-09-03 RX ORDER — MAGNESIUM OXIDE TAB 400 MG (241.3 MG ELEMENTAL MG) 400 (241.3 MG) MG
TAB ORAL
COMMUNITY
Start: 2025-07-30

## 2025-09-03 RX ORDER — FLUCONAZOLE 200 MG/1
TABLET ORAL
COMMUNITY
Start: 2025-06-24

## 2025-09-03 RX ORDER — METHOCARBAMOL 750 MG/1
TABLET, FILM COATED ORAL
COMMUNITY
Start: 2025-06-13

## 2025-09-03 RX ORDER — LIDOCAINE 50 MG/G
1 PATCH TOPICAL EVERY 24 HOURS
COMMUNITY
Start: 2025-06-08

## 2025-09-03 RX ORDER — DULAGLUTIDE 1.5 MG/.5ML
INJECTION, SOLUTION SUBCUTANEOUS
COMMUNITY
Start: 2025-08-17

## 2025-09-03 RX ORDER — METOCLOPRAMIDE 10 MG/1
TABLET ORAL
COMMUNITY
Start: 2025-05-28

## 2025-09-03 RX ORDER — GUAIFENESIN 600 MG/1
600 TABLET, EXTENDED RELEASE ORAL 2 TIMES DAILY
Qty: 20 TABLET | Refills: 0 | Status: SHIPPED | OUTPATIENT
Start: 2025-09-03 | End: 2025-09-13

## 2025-09-03 RX ORDER — PROPRANOLOL HYDROCHLORIDE 10 MG/1
TABLET ORAL
COMMUNITY
Start: 2025-06-13

## 2025-09-03 RX ORDER — FERROUS SULFATE 325(65) MG
325 TABLET ORAL DAILY
COMMUNITY
Start: 2025-05-03

## 2025-09-03 RX ORDER — AZITHROMYCIN 250 MG/1
TABLET, FILM COATED ORAL
Qty: 6 TABLET | Refills: 0 | Status: SHIPPED | OUTPATIENT
Start: 2025-09-03 | End: 2025-09-13

## 2025-09-03 RX ORDER — DEXTROMETHORPHAN HBR AND PYRILAMINE MALEATE 30; 30 MG/1; MG/1
1 TABLET ORAL 4 TIMES DAILY PRN
Qty: 40 TABLET | Refills: 0 | Status: SHIPPED | OUTPATIENT
Start: 2025-09-03

## 2025-09-03 RX ORDER — ALBUTEROL SULFATE 90 UG/1
2 INHALANT RESPIRATORY (INHALATION) EVERY 6 HOURS PRN
Qty: 18 G | Refills: 0 | Status: SHIPPED | OUTPATIENT
Start: 2025-09-03

## 2025-09-03 RX ORDER — METHYLPREDNISOLONE 4 MG/1
TABLET ORAL
Qty: 1 KIT | Refills: 0 | Status: SHIPPED | OUTPATIENT
Start: 2025-09-03

## 2025-09-03 RX ORDER — TRIAMCINOLONE ACETONIDE 1 MG/ML
LOTION TOPICAL
COMMUNITY
Start: 2025-05-29

## 2025-09-03 RX ADMIN — IPRATROPIUM BROMIDE AND ALBUTEROL SULFATE 1 DOSE: 2.5; .5 SOLUTION RESPIRATORY (INHALATION) at 13:43

## 2025-09-03 ASSESSMENT — ENCOUNTER SYMPTOMS
SINUS PAIN: 1
RHINORRHEA: 1
SINUS PRESSURE: 1
VOICE CHANGE: 0
CHEST TIGHTNESS: 1
ABDOMINAL PAIN: 0
NAUSEA: 0
SORE THROAT: 0
WHEEZING: 1
COUGH: 1
VOMITING: 0

## 2025-09-03 ASSESSMENT — VISUAL ACUITY: OU: 1

## 2025-09-04 ASSESSMENT — ENCOUNTER SYMPTOMS: SHORTNESS OF BREATH: 1

## (undated) DEVICE — PACEMAKER PACK: Brand: MEDLINE INDUSTRIES, INC.